# Patient Record
Sex: FEMALE | Race: WHITE | NOT HISPANIC OR LATINO | Employment: OTHER | URBAN - METROPOLITAN AREA
[De-identification: names, ages, dates, MRNs, and addresses within clinical notes are randomized per-mention and may not be internally consistent; named-entity substitution may affect disease eponyms.]

---

## 2019-01-26 ENCOUNTER — HOSPITAL ENCOUNTER (OUTPATIENT)
Facility: CLINIC | Age: 79
Setting detail: OBSERVATION
Discharge: HOME | End: 2019-01-27
Attending: EMERGENCY MEDICINE | Admitting: INTERNAL MEDICINE
Payer: MEDICARE

## 2019-01-26 ENCOUNTER — APPOINTMENT (OUTPATIENT)
Dept: GENERAL RADIOLOGY | Facility: CLINIC | Age: 79
End: 2019-01-26
Payer: MEDICARE

## 2019-01-26 DIAGNOSIS — J44.1 COPD EXACERBATION (H): ICD-10-CM

## 2019-01-26 DIAGNOSIS — J40 BRONCHITIS: ICD-10-CM

## 2019-01-26 LAB
ANION GAP SERPL CALCULATED.3IONS-SCNC: 8 MMOL/L (ref 3–14)
BASOPHILS # BLD AUTO: 0 10E9/L (ref 0–0.2)
BASOPHILS NFR BLD AUTO: 0.2 %
BUN SERPL-MCNC: 15 MG/DL (ref 7–30)
CALCIUM SERPL-MCNC: 9.1 MG/DL (ref 8.5–10.1)
CHLORIDE SERPL-SCNC: 101 MMOL/L (ref 94–109)
CO2 SERPL-SCNC: 29 MMOL/L (ref 20–32)
CREAT SERPL-MCNC: 0.84 MG/DL (ref 0.52–1.04)
DIFFERENTIAL METHOD BLD: NORMAL
EOSINOPHIL # BLD AUTO: 0 10E9/L (ref 0–0.7)
EOSINOPHIL NFR BLD AUTO: 0.6 %
ERYTHROCYTE [DISTWIDTH] IN BLOOD BY AUTOMATED COUNT: 14.1 % (ref 10–15)
FLUAV+FLUBV AG SPEC QL: NEGATIVE
FLUAV+FLUBV AG SPEC QL: NEGATIVE
GFR SERPL CREATININE-BSD FRML MDRD: 67 ML/MIN/{1.73_M2}
GLUCOSE SERPL-MCNC: 108 MG/DL (ref 70–99)
HCT VFR BLD AUTO: 40.8 % (ref 35–47)
HGB BLD-MCNC: 13.7 G/DL (ref 11.7–15.7)
IMM GRANULOCYTES # BLD: 0 10E9/L (ref 0–0.4)
IMM GRANULOCYTES NFR BLD: 0.4 %
INTERPRETATION ECG - MUSE: NORMAL
LYMPHOCYTES # BLD AUTO: 0.9 10E9/L (ref 0.8–5.3)
LYMPHOCYTES NFR BLD AUTO: 15.9 %
MCH RBC QN AUTO: 30 PG (ref 26.5–33)
MCHC RBC AUTO-ENTMCNC: 33.6 G/DL (ref 31.5–36.5)
MCV RBC AUTO: 89 FL (ref 78–100)
MONOCYTES # BLD AUTO: 0.5 10E9/L (ref 0–1.3)
MONOCYTES NFR BLD AUTO: 8.4 %
NEUTROPHILS # BLD AUTO: 4 10E9/L (ref 1.6–8.3)
NEUTROPHILS NFR BLD AUTO: 74.5 %
NRBC # BLD AUTO: 0 10*3/UL
NRBC BLD AUTO-RTO: 0 /100
PLATELET # BLD AUTO: 214 10E9/L (ref 150–450)
POTASSIUM SERPL-SCNC: 3.5 MMOL/L (ref 3.4–5.3)
RBC # BLD AUTO: 4.57 10E12/L (ref 3.8–5.2)
SODIUM SERPL-SCNC: 138 MMOL/L (ref 133–144)
SPECIMEN SOURCE: NORMAL
WBC # BLD AUTO: 5.3 10E9/L (ref 4–11)

## 2019-01-26 PROCEDURE — 99207 ZZC CDG-HISTORY COMP: MEETS EXP. PROB FOCUSED- DOWN CODED LACK OF PFSH: CPT | Performed by: INTERNAL MEDICINE

## 2019-01-26 PROCEDURE — 94640 AIRWAY INHALATION TREATMENT: CPT

## 2019-01-26 PROCEDURE — 99285 EMERGENCY DEPT VISIT HI MDM: CPT | Mod: 25

## 2019-01-26 PROCEDURE — 25000125 ZZHC RX 250: Performed by: EMERGENCY MEDICINE

## 2019-01-26 PROCEDURE — 71046 X-RAY EXAM CHEST 2 VIEWS: CPT

## 2019-01-26 PROCEDURE — 93005 ELECTROCARDIOGRAM TRACING: CPT

## 2019-01-26 PROCEDURE — 99207 ZZC MOONLIGHTING INDICATOR: CPT | Performed by: INTERNAL MEDICINE

## 2019-01-26 PROCEDURE — 40000275 ZZH STATISTIC RCP TIME EA 10 MIN: Mod: 76

## 2019-01-26 PROCEDURE — 25000132 ZZH RX MED GY IP 250 OP 250 PS 637: Mod: GY | Performed by: PHYSICIAN ASSISTANT

## 2019-01-26 PROCEDURE — 80048 BASIC METABOLIC PNL TOTAL CA: CPT | Performed by: EMERGENCY MEDICINE

## 2019-01-26 PROCEDURE — A9270 NON-COVERED ITEM OR SERVICE: HCPCS | Mod: GY | Performed by: PHYSICIAN ASSISTANT

## 2019-01-26 PROCEDURE — 87804 INFLUENZA ASSAY W/OPTIC: CPT | Performed by: EMERGENCY MEDICINE

## 2019-01-26 PROCEDURE — G0378 HOSPITAL OBSERVATION PER HR: HCPCS

## 2019-01-26 PROCEDURE — 85025 COMPLETE CBC W/AUTO DIFF WBC: CPT | Performed by: EMERGENCY MEDICINE

## 2019-01-26 PROCEDURE — 99218 ZZC INITIAL OBSERVATION CARE,LEVL I: CPT | Performed by: INTERNAL MEDICINE

## 2019-01-26 PROCEDURE — 94640 AIRWAY INHALATION TREATMENT: CPT | Mod: 76

## 2019-01-26 PROCEDURE — 25000125 ZZHC RX 250: Performed by: INTERNAL MEDICINE

## 2019-01-26 RX ORDER — PREDNISONE 20 MG/1
40 TABLET ORAL DAILY
Status: DISCONTINUED | OUTPATIENT
Start: 2019-01-27 | End: 2019-01-27 | Stop reason: HOSPADM

## 2019-01-26 RX ORDER — PREDNISONE 20 MG/1
40 TABLET ORAL ONCE
Status: COMPLETED | OUTPATIENT
Start: 2019-01-26 | End: 2019-01-26

## 2019-01-26 RX ORDER — TRIAMTERENE AND HYDROCHLOROTHIAZIDE 37.5; 25 MG/1; MG/1
1 CAPSULE ORAL DAILY
Status: DISCONTINUED | OUTPATIENT
Start: 2019-01-26 | End: 2019-01-26

## 2019-01-26 RX ORDER — LEVOFLOXACIN 500 MG/1
500 TABLET, FILM COATED ORAL DAILY
Status: ON HOLD | COMMUNITY
End: 2019-01-27

## 2019-01-26 RX ORDER — ALBUTEROL SULFATE 0.83 MG/ML
2.5 SOLUTION RESPIRATORY (INHALATION) ONCE
Status: COMPLETED | OUTPATIENT
Start: 2019-01-26 | End: 2019-01-26

## 2019-01-26 RX ORDER — CODEINE PHOSPHATE AND GUAIFENESIN 10; 100 MG/5ML; MG/5ML
5 SOLUTION ORAL EVERY 4 HOURS PRN
Status: DISCONTINUED | OUTPATIENT
Start: 2019-01-26 | End: 2019-01-27 | Stop reason: HOSPADM

## 2019-01-26 RX ORDER — LEVOFLOXACIN 500 MG/1
500 TABLET, FILM COATED ORAL DAILY
Status: DISCONTINUED | OUTPATIENT
Start: 2019-01-26 | End: 2019-01-27 | Stop reason: HOSPADM

## 2019-01-26 RX ORDER — IPRATROPIUM BROMIDE AND ALBUTEROL SULFATE 2.5; .5 MG/3ML; MG/3ML
1 SOLUTION RESPIRATORY (INHALATION) DAILY
Status: DISCONTINUED | OUTPATIENT
Start: 2019-01-26 | End: 2019-01-27 | Stop reason: HOSPADM

## 2019-01-26 RX ORDER — METHYLPREDNISOLONE SODIUM SUCCINATE 125 MG/2ML
125 INJECTION, POWDER, LYOPHILIZED, FOR SOLUTION INTRAMUSCULAR; INTRAVENOUS ONCE
Status: DISCONTINUED | OUTPATIENT
Start: 2019-01-26 | End: 2019-01-26

## 2019-01-26 RX ORDER — ACETAMINOPHEN 650 MG/1
650 SUPPOSITORY RECTAL EVERY 4 HOURS PRN
Status: DISCONTINUED | OUTPATIENT
Start: 2019-01-26 | End: 2019-01-27 | Stop reason: HOSPADM

## 2019-01-26 RX ORDER — ALBUTEROL SULFATE 0.83 MG/ML
3 SOLUTION RESPIRATORY (INHALATION)
Status: DISCONTINUED | OUTPATIENT
Start: 2019-01-26 | End: 2019-01-27 | Stop reason: HOSPADM

## 2019-01-26 RX ORDER — TRIAMTERENE/HYDROCHLOROTHIAZID 37.5-25 MG
1 TABLET ORAL DAILY
Status: DISCONTINUED | OUTPATIENT
Start: 2019-01-26 | End: 2019-01-27 | Stop reason: HOSPADM

## 2019-01-26 RX ORDER — ACETAMINOPHEN 325 MG/1
650 TABLET ORAL EVERY 4 HOURS PRN
Status: DISCONTINUED | OUTPATIENT
Start: 2019-01-26 | End: 2019-01-27 | Stop reason: HOSPADM

## 2019-01-26 RX ORDER — LIDOCAINE 40 MG/G
CREAM TOPICAL
Status: DISCONTINUED | OUTPATIENT
Start: 2019-01-26 | End: 2019-01-27 | Stop reason: HOSPADM

## 2019-01-26 RX ORDER — IPRATROPIUM BROMIDE AND ALBUTEROL SULFATE 2.5; .5 MG/3ML; MG/3ML
3 SOLUTION RESPIRATORY (INHALATION)
Status: DISCONTINUED | OUTPATIENT
Start: 2019-01-26 | End: 2019-01-26

## 2019-01-26 RX ORDER — ALBUTEROL SULFATE 90 UG/1
2 AEROSOL, METERED RESPIRATORY (INHALATION) 2 TIMES DAILY
Status: DISCONTINUED | OUTPATIENT
Start: 2019-01-26 | End: 2019-01-26

## 2019-01-26 RX ORDER — TRIAMTERENE AND HYDROCHLOROTHIAZIDE 37.5; 25 MG/1; MG/1
1 CAPSULE ORAL DAILY
COMMUNITY
End: 2021-03-10

## 2019-01-26 RX ADMIN — GUAIFENESIN AND CODEINE PHOSPHATE 5 ML: 100; 10 SOLUTION ORAL at 21:43

## 2019-01-26 RX ADMIN — PREDNISONE 40 MG: 20 TABLET ORAL at 13:20

## 2019-01-26 RX ADMIN — ALBUTEROL SULFATE 2.5 MG: 2.5 SOLUTION RESPIRATORY (INHALATION) at 10:50

## 2019-01-26 RX ADMIN — IPRATROPIUM BROMIDE AND ALBUTEROL SULFATE 3 ML: .5; 2.5 SOLUTION RESPIRATORY (INHALATION) at 20:08

## 2019-01-26 ASSESSMENT — ENCOUNTER SYMPTOMS
FEVER: 1
CONSTIPATION: 0
SHORTNESS OF BREATH: 1
DIARRHEA: 0
COUGH: 1

## 2019-01-26 ASSESSMENT — MIFFLIN-ST. JEOR: SCORE: 1363.58

## 2019-01-26 NOTE — H&P
[facility]    History and Physical  Hospitalist       Date of Admission:  1/26/2019    Assessment & Plan   Nena Sanders is a 78 year old female with a history of COPD and chronic bronchiolitis who presents with shortness of breath. In the ED patient's blood pressure was normal.  Rest of vitals are normal.  Was afebrile.  EKG with a heart rate of 98.  Chest x-ray unremarkable.  Labs are unremarkable with a white count of 5.3.  Influenza was negative.  She was given albuterol nebulizer.  She was also given 40 mg of prednisone.  Patient was admitted for a COPD exacerbation.    Problem 1: COPD exacerbation  -Patient admitted for COPD exacerbation.  Will give 125 mcg of IV methylprednisone as a one-time dose on the floor.  Will start prednisone 40 mg daily tomorrow.  Will likely need short course of 5-7 days per steroids.  -Respiratory care consult.  Patient has gotten albuterol nebulizers in the ED.  We will continue for now.  currently on 3 liters of oxygen by nasal cannula and saturating well in the upper 90s-100% range  -We will also continue the patient's likely will need Levaquin.  Course of 5-7 days on discharge.  -Sees pulmonary as an outpatient.  No need for pulmonary consult at this time.    Problem 2: Obstructive sleep apnea.    -The patient does not use CPAP.  She is on 4 liters of oxygen throughout the night while sleeping.    Problem 3:  Left hip osteoarthritis  - stable    # Pain Assessment:  Current Pain Score 1/26/2019   Patient currently in pain? denies   Pain score (0-10) 0   Pain location -   Pain descriptors -   Nena s pain level was assessed and she currently denies pain.      DVT Prophylaxis: Low Risk/Ambulatory with no VTE prophylaxis indicated  Code Status: Full Code    Disposition: Expected discharge in 1 days once breathing improves.    Chi Gallego MD    Primary Care Physician   Damon Leon    Chief Complaint   COPD exaccerbation    History is obtained from the  patient    History of Present Illness   Nena Sanders is a 78 year old female with a history of COPD and chronic bronchiolitis who presents with shortness of breath. The patient has had a productive cough since yesterday in the setting of a possible fever subjectively, but presents to the ED with worsening shortness of breath over the last day. There are no exacerbators of alleviators of her shortness of breath. She otherwise denies blood in her sputum, chest pain, abnormal bowel movements, or pedal edema. She has COPD, but has never been in the hospital for exacerbations. She is prescribed Levaquin by her pulmonologist for preventative care when she develops these types of productive coughs and has taken two doses of Levaquin in the last two days. She uses both inhalers and nebulizers and nebulizes in the mornings. She oxygenates in the low 90s at baseline. Her flu shot is up-to-date.     In the ED patient's blood pressure was normal.  Rest of vitals are normal.  Was afebrile.  EKG with a heart rate of 98.  Chest x-ray unremarkable.  Labs are unremarkable with a white count of 5.3.  Influenza was negative.  She was given albuterol nebulizer.  She was also given 40 mg of prednisone.  Patient was admitted for a COPD exacerbation.    Past Medical History    I have reviewed this patient's medical history and updated it with pertinent information if needed.   Past Medical History:   Diagnosis Date     Arthritis     hip     Chronic obstructive lung disease (H)      Chronic rhinitis      COPD (chronic obstructive pulmonary disease) (H)      Macular degeneration     wet     Nocturnal hypoxia      Osteopenia      Oxygen dependent     4L/nc at night and as needed during the day     Pulmonary nodules      Radius fracture      Sleep apnea     does not use cpap       Past Surgical History   I have reviewed this patient's surgical history and updated it with pertinent information if needed.  Past Surgical History:    Procedure Laterality Date     ARTHROPLASTY HIP ANTERIOR Right 12/16/2015    Procedure: ARTHROPLASTY HIP ANTERIOR;  Surgeon: Alf Rivera MD;  Location: [facility] OR     ARTHROPLASTY HIP ANTERIOR Left 1/26/2016    Procedure: ARTHROPLASTY HIP ANTERIOR;  Surgeon: Alf Rivera MD;  Location: [facility] OR     COLONOSCOPY       EYE SURGERY      bilat cataracts     Pr punc/aspir breast cyst benign       TONSILLECTOMY      AGE 11       Prior to Admission Medications   Prior to Admission Medications   Prescriptions Last Dose Informant Patient Reported? Taking?   Ipratropium-Albuterol (COMBIVENT RESPIMAT)  MCG/ACT inhaler 1/25/2019 at [facility] Yes Yes   Sig: Inhale 1 puff into the lungs daily   albuterol (PROAIR HFA, PROVENTIL HFA, VENTOLIN HFA) 108 (90 BASE) MCG/ACT inhaler  at PRN Self Yes Yes   Sig: Inhale 2 puffs into the lungs 2 times daily   fluticasone-salmeterol (ADVAIR) 250-50 MCG/DOSE diskus inhaler 1/25/2019 at [facility] Yes Yes   Sig: Inhale 2 puffs into the lungs every 12 hours   guaiFENesin-codeine (ROBITUSSIN AC) 100-10 MG/5ML SOLN  at PRN Self Yes Yes   Sig: Take 1 tsp. by mouth daily as needed    ipratropium - albuterol 0.5 mg/2.5 mg/3 mL (DUONEB) 0.5-2.5 (3) MG/3ML nebulization 1/25/2019 at am Self Yes Yes   Sig: Take 1 vial by nebulization daily    levofloxacin (LEVAQUIN) 500 MG tablet 1/26/2019 at 2nd dose this am Pharmacy Yes Yes   Sig: Take 500 mg by mouth daily For 7 days   triamterene-HCTZ (DYAZIDE) 37.5-25 MG capsule 1/25/2019 at Unknown time Pharmacy Yes Yes   Sig: Take 1 capsule by mouth daily      Facility-Administered Medications: None     Allergies   No Known Allergies    Social History   I have reviewed this patient's social history and updated it with pertinent information if needed. Nena Sanders  reports that  has never smoked. She does not have any smokeless tobacco history on file. She reports that she drinks alcohol. She reports that she does not use drugs.    Family History    I have reviewed this patient's family history and updated it with pertinent information if needed.   No family history on file.    Review of Systems   The 10 point Review of Systems is negative other than noted in the HPI or here.     Physical Exam   Temp: 97.6  F (36.4  C) Temp src: Temporal BP: 141/86 Pulse: 102 Heart Rate: 104 Resp: 20 SpO2: 97 % O2 Device: Nasal cannula Oxygen Delivery: 3 LPM  Vital Signs with Ranges  Temp:  [97.6  F (36.4  C)] 97.6  F (36.4  C)  Pulse:  [] 102  Heart Rate:  [104] 104  Resp:  [20-24] 20  BP: (141-153)/(72-97) 141/86  SpO2:  [93 %-98 %] 97 %  190 lbs 0 oz    GEN: NAD, pleasant  HEENT: no icterus  CV: RRR, normal s1/s2, no murmurs/rubs/s3/s4, no heave. JVP normal.  CHEST: CTAB  ABD: soft, NT/ND, NABS  : no flank/suprapubic tenderness  NEURO: AA&Ox3, fluent/appropriate, motor grossly nonfocal  PSYCH: cooperative, affect appropriate    Data   Data reviewed today:  I personally reviewed no images or EKG's today.  Recent Labs   Lab 01/26/19  1045   WBC 5.3   HGB 13.7   MCV 89         POTASSIUM 3.5   CHLORIDE 101   CO2 29   BUN 15   CR 0.84   ANIONGAP 8   TANISHA 9.1   *       Imaging:  Recent Results (from the past 24 hour(s))   XR Chest 2 Views    Narrative    CHEST TWO VIEWS   1/26/2019 11:11 AM    HISTORY:  Productive cough and dyspnea.    COMPARISON:  None.    FINDINGS:  The heart size is normal. No mediastinal pathology is seen.  The lungs are clear. The pulmonary vasculature is normal. No  pneumothorax or pleural effusion is seen.      Impression    IMPRESSION:  Unremarkable chest.    DAVID PLASENCIA MD

## 2019-01-26 NOTE — ED PROVIDER NOTES
History     Chief Complaint:  Shortness of breath     HPI   Nena Sanders is a 78 year old female with a history of COPD and chronic bronchiolitis who presents with shortness of breath. The patient has had a productive cough since yesterday in the setting of a possible fever subjectively, but presents to the ED with worsening shortness of breath over the last day. There are no exacerbators of alleviators of her shortness of breath. She otherwise denies blood in her sputum, chest pain, abnormal bowel movements, or pedal edema. She has COPD, but has never been in the hospital for exacerbations. She is prescribed Levaquin by her pulmonologist for preventative care when she develops these types of productive coughs and has taken two doses of Levaquin in the last two days. She uses both inhalers and nebulizers and nebulizes in the mornings. She oxygenates in the low 90s at baseline. Her flu shot is up-to-date.     PE/DVT RISK FACTORS:  Sex:    Female  Hormones:   No  Tobacco:   No  Cancer:   No  Travel:   No  Surgery:   No  Other immobilization: No  Personal history:  No  Family history:  No     Allergies:  No known drug allergies     Medications:    Compavent  Advair  Ferrous sulfate   Albuterol inhaler  Robitussin  Duoneb  Roxicodone     Past Medical History:    Chronic ronchiolitis   Arthritis  COPD  Chronic rhinitis  Macular degeneration  Nocturnal hypoxia  CHAN  Osteopenia  Pulmonary nodules    Past Surgical History:    Hip arthroplasty bilateral  Colonoscopy  Bilateral cataracts  Pr punc/aspir breast cyst benign  Tonsillectomy    Family History:    History reviewed. No pertinent family history.      Social History:  Smoking Status: Never Smoker  Alcohol Use: Yes  Patient presents with daughter.   Marital Status:  Single      Review of Systems   Constitutional: Positive for fever (subjective).   Respiratory: Positive for cough and shortness of breath.    Cardiovascular: Negative for chest pain and leg  "swelling.   Gastrointestinal: Negative for constipation and diarrhea.   All other systems reviewed and are negative.    Physical Exam     Patient Vitals for the past 24 hrs:   BP Temp Temp src Pulse Heart Rate Resp SpO2 Height Weight   01/26/19 1518 -- -- -- -- -- -- 94 % -- --   01/26/19 1513 138/77 99  F (37.2  C) Oral -- 91 20 96 % -- --   01/26/19 1245 141/86 -- -- 102 -- 20 97 % -- --   01/26/19 1230 153/77 -- -- 103 -- 20 98 % -- --   01/26/19 1215 (!) 148/97 -- -- 91 -- 20 98 % -- --   01/26/19 1100 -- -- -- -- -- -- 96 % -- --   01/26/19 1037 -- -- -- -- -- -- 93 % -- --   01/26/19 1024 152/72 97.6  F (36.4  C) Temporal -- 104 24 93 % 1.676 m (5' 6\") 86.2 kg (190 lb)      Physical Exam   SKIN:  Warm, dry.  HEMATOLOGIC/IMMUNOLOGIC/LYMPHATIC:  No pallor.  No peripheral edema.  HENT:  No JVD.  EYES:  Conjunctivae normal.  CARDIOVASCULAR:  Regular rate and rhythm, no murmur.  No rub.  RESPIRATORY:  No respiratory distress, breath sounds equal and normal.  GASTROINTESTINAL:  Soft, nontender abdomen.  No mass or distension.  MUSCULOSKELETAL:  Normal body habitus.  NEUROLOGIC:  Alert, conversant.  PSYCHIATRIC:  Normal mood.    Emergency Department Course     ECG (11:18:21):  Rate 98 bpm. CA interval 134. QRS duration 92. QT/QTc 372/474. P-R-T axes 83 79 75. Normal sinus rhythm. Right atrial enlargement. Nonspecific ST abnormality. Abnormal ECG. Interpreted at 1151 by Neal Pope MD.     Imaging:  Radiographic findings were communicated with the patient who voiced understanding of the findings.    X-ray Chest, 2 views:  Unremarkable chest.  Result per radiology.     Laboratory:  CBC: WNL (WBC 5.3, HGB 13.7, )  BMP: Glucose 108 (H), o/w WNL (Creatinine 0.84)     Influenza A/B Antigen: Negative    Interventions:  1050 - Albuterol 2.5 mg neb  1320 - Prednisone 40 mg PO     Emergency Department Course:  Past medical records, nursing notes, and vitals reviewed.  1035: I performed an exam of the patient and " "obtained history, as documented above.    IV inserted and blood drawn. This was sent to laboratory for testing, findings above. The patient was placed on cardiac monitoring. The patient was sent for a chest x-ray while in the emergency department, findings above.     1148: I rechecked the patient. Explained findings to patient.    1232: The patient became very short of breath after a \"road test.\"     1233: Findings and plan explained to the Patient who consents to admission.     1300: Discussed the patient with Dr. Gallego, who will admit the patient to a hospital bed for further monitoring, evaluation, and treatment.      Impression & Plan      Medical Decision Making:  Nena Sanders is a 78 year old female who presents with what seems to be exacerbated COPD. She lacks risk factors for pulmonary embolism. Feels like a COPD exacerbation to her.  Evaluation reassuring. She ambulated on supplemental oxygen and was very short of breath in doing so.  She'll be admitted for further evaluation and treatment under the care of Dr. Gallego.    Diagnosis:    ICD-10-CM    1. COPD exacerbation (H) J44.1    2. Bronchitis J40      Disposition:  Admitted to the hospital.    Timbo Paez  1/26/2019    EMERGENCY DEPARTMENT    Scribe Disclosure:  Timbo REID Chi, am serving as a scribe at 10:35 AM on 1/26/2019 to document services personally performed by Neal Pope MD based on my observations and the provider's statements to me.        Neal Pope MD  01/27/19 1036    "

## 2019-01-26 NOTE — PROGRESS NOTES
"[facility]  ED Nurse Handoff Report    ED Chief complaint: Shortness of Breath (hx of copd. prod cough. on levaquin)      ED Diagnosis:   Final diagnoses:   COPD exacerbation (H)   Bronchitis       Code Status: Full Code    Allergies: No Known Allergies    Activity level - Baseline/Home:  Independent    Activity Level - Current:   Stand with Assist     Needed?: No    Isolation: No  Infection: Not Applicable  Bariatric?: No    Vital Signs:   Vitals:    01/26/19 1037 01/26/19 1100 01/26/19 1215 01/26/19 1230   BP:   (!) 148/97 153/77   Pulse:   91 103   Resp:   20 20   Temp:       TempSrc:       SpO2: 93% 96% 98% 98%   Weight:       Height:           Cardiac Rhythm: ,        Pain level: 0-10 Pain Scale: 0    Is this patient confused?: No   Does this patient have a guardian?  No         If yes, is there guardianship documents in the Epic \"Code/ACP\" activity?  N/A         Guardian Notified?  N/A  [place] - Suicide Severity Rating Scale Completed?  Yes  If yes, what color did the patient score?  White    Patient Report: Initial Complaint: SOB  Focused Assessment:   Tests Performed: see labs  Abnormal Results: see results  Treatments provided: nebs    Family Comments: daughter at bedside  OBS brochure/video discussed/provided to patient/family: Yes              Name of person given brochure if not patient:               Relationship to patient:     ED Medications:   Medications   albuterol (PROVENTIL) neb solution 2.5 mg (2.5 mg Nebulization Given 1/26/19 1050)       Drips infusing?:  No    For the majority of the shift this patient was Green.   Interventions performed were see noites    Severe Sepsis OR Septic Shock Diagnosis Present: No    To be done/followed up on inpatient unit:     ED NURSE PHONE NUMBER: [phone]         "

## 2019-01-26 NOTE — PHARMACY-ADMISSION MEDICATION HISTORY
Admission medication history interview status for the 1/26/2019  admission is complete. See EPIC admission navigator for prior to admission medications     Medication history source reliability:Good    Actions taken by pharmacist (provider contacted, etc):Spoke with patient, had her inhalers and triamterene/hctz and levofloxacin prescription bottles.     Additional medication history information not noted on PTA med list:  - Deleted acetaminophen, oxycodone, senna -- patient had those for a hip surgery, no longer taking  - Patient would like to use her own inhalers inpatient if possible -- has both of them with her  - Currently on levofloxacin 500mg once daily x 7 days -- completed 2 days of therapy thus far (last dose this am)     Medication reconciliation/reorder completed by provider prior to medication history? No    Time spent in this activity: 20 min    Prior to Admission medications    Medication Sig Last Dose Taking? Auth Provider   albuterol (PROAIR HFA, PROVENTIL HFA, VENTOLIN HFA) 108 (90 BASE) MCG/ACT inhaler Inhale 2 puffs into the lungs 2 times daily  at PRN Yes Reported, Patient   fluticasone-salmeterol (ADVAIR) 250-50 MCG/DOSE diskus inhaler Inhale 2 puffs into the lungs every 12 hours 1/25/2019 at PM Yes Reported, Patient   guaiFENesin-codeine (ROBITUSSIN AC) 100-10 MG/5ML SOLN Take 1 tsp. by mouth daily as needed   at PRN Yes Reported, Patient   ipratropium - albuterol 0.5 mg/2.5 mg/3 mL (DUONEB) 0.5-2.5 (3) MG/3ML nebulization Take 1 vial by nebulization daily  1/25/2019 at am Yes Reported, Patient   Ipratropium-Albuterol (COMBIVENT RESPIMAT)  MCG/ACT inhaler Inhale 1 puff into the lungs daily 1/25/2019 at PM Yes Unknown, Entered By History   levofloxacin (LEVAQUIN) 500 MG tablet Take 500 mg by mouth daily For 7 days 1/26/2019 at 2nd dose this am Yes Unknown, Entered By History   triamterene-HCTZ (DYAZIDE) 37.5-25 MG capsule Take 1 capsule by mouth daily 1/25/2019 at Unknown time Yes  Unknown, Entered By History       Shanique Thompson, PharmD IV Student

## 2019-01-26 NOTE — ED NOTES
"Patient was ambulated here in the ED. O2 saturations remained between 94%-98% and HR remained between 100-110 bpm. However, the patient states that she feels markedly short of breath and \"exhausted.\"  "

## 2019-01-27 VITALS
DIASTOLIC BLOOD PRESSURE: 66 MMHG | TEMPERATURE: 97.9 F | BODY MASS INDEX: 30.53 KG/M2 | HEART RATE: 102 BPM | SYSTOLIC BLOOD PRESSURE: 133 MMHG | WEIGHT: 190 LBS | OXYGEN SATURATION: 92 % | HEIGHT: 66 IN | RESPIRATION RATE: 16 BRPM

## 2019-01-27 PROCEDURE — A9270 NON-COVERED ITEM OR SERVICE: HCPCS | Mod: GY | Performed by: PHYSICIAN ASSISTANT

## 2019-01-27 PROCEDURE — 40000275 ZZH STATISTIC RCP TIME EA 10 MIN

## 2019-01-27 PROCEDURE — G0378 HOSPITAL OBSERVATION PER HR: HCPCS

## 2019-01-27 PROCEDURE — 94640 AIRWAY INHALATION TREATMENT: CPT

## 2019-01-27 PROCEDURE — 25000132 ZZH RX MED GY IP 250 OP 250 PS 637: Mod: GY | Performed by: PHYSICIAN ASSISTANT

## 2019-01-27 PROCEDURE — 25000132 ZZH RX MED GY IP 250 OP 250 PS 637: Mod: GY | Performed by: INTERNAL MEDICINE

## 2019-01-27 PROCEDURE — 25000125 ZZHC RX 250: Performed by: INTERNAL MEDICINE

## 2019-01-27 RX ORDER — IPRATROPIUM BROMIDE AND ALBUTEROL SULFATE 2.5; .5 MG/3ML; MG/3ML
1 SOLUTION RESPIRATORY (INHALATION) DAILY
Qty: 90 ML | Refills: 0 | Status: ON HOLD
Start: 2019-01-27 | End: 2019-12-18

## 2019-01-27 RX ORDER — PREDNISONE 10 MG/1
TABLET ORAL
Qty: 28 TABLET | Refills: 0 | Status: ON HOLD | OUTPATIENT
Start: 2019-01-27 | End: 2019-12-18

## 2019-01-27 RX ORDER — ALBUTEROL SULFATE 90 UG/1
2 AEROSOL, METERED RESPIRATORY (INHALATION) EVERY 4 HOURS PRN
Status: ON HOLD
Start: 2019-01-27 | End: 2019-12-18

## 2019-01-27 RX ORDER — LEVOFLOXACIN 500 MG/1
500 TABLET, FILM COATED ORAL DAILY
Qty: 5 TABLET | Refills: 0
Start: 2019-01-27 | End: 2019-02-01

## 2019-01-27 RX ADMIN — TRIAMTERENE AND HYDROCHLOROTHIAZIDE 1 TABLET: 37.5; 25 TABLET ORAL at 08:43

## 2019-01-27 RX ADMIN — LEVOFLOXACIN 500 MG: 500 TABLET, FILM COATED ORAL at 08:46

## 2019-01-27 RX ADMIN — IPRATROPIUM BROMIDE AND ALBUTEROL SULFATE 3 ML: .5; 2.5 SOLUTION RESPIRATORY (INHALATION) at 08:11

## 2019-01-27 RX ADMIN — GUAIFENESIN AND CODEINE PHOSPHATE 5 ML: 100; 10 SOLUTION ORAL at 10:06

## 2019-01-27 RX ADMIN — GUAIFENESIN AND CODEINE PHOSPHATE 5 ML: 100; 10 SOLUTION ORAL at 02:23

## 2019-01-27 RX ADMIN — PREDNISONE 40 MG: 20 TABLET ORAL at 08:43

## 2019-01-27 NOTE — PLAN OF CARE
Patient desats to 85% on RA with activity - returns to low 90%'s on RA - Dr. Aguilar aware, up independently, voids independently in bathroom, denies pain, SL discontinued, discharge instructions reviewed with patient, and patient discharged home.

## 2019-01-27 NOTE — DISCHARGE SUMMARY
[facility]  Discharge Summary        Nena Sanders MRN# [id number]   YOB: 1940 Age: 78 year old     Date of Admission:  1/26/2019  Date of Discharge:  1/27/2019  Admitting Physician:  Chi Gallego MD  Discharge Physician: Erica Aguilar MD  Discharging Service: Hospitalist     Primary Provider: Damon Leon  Primary Care Physician Phone Number: [phone]         Discharge Diagnoses/Problem Oriented Hospital Course (Providers):    Nena Sanders was admitted on 1/26/2019 by Chi Gallego MD and I would refer you to their history and physical.  The following problems were addressed during her hospitalization:  78 year old female with a history of COPD and chronic bronchiolitis who presents with shortness of breath. In the ED patient's blood pressure was normal.  Rest of vitals are normal.  Was afebrile.  EKG with a heart rate of 98.  Chest x-ray unremarkable.  Labs are unremarkable with a white count of 5.3.  Influenza was negative.  She was given albuterol nebulizer.  She was also given 40 mg of prednisone.  Patient was admitted for a COPD exacerbation.     COPD exacerbation:  -Patient admitted for COPD exacerbation.    -she got 125 mcg of IV methylprednisone as a one-time dose on the floor.    she was  Started on po prednisone 40 mg   -dischaged on tapering dose  -patient improved   -she used nebulizer also  -she  will also continue  Levaquin.    Course of 5-7 days on discharge.  -She started that herself as  Has back up script by her pulmonary  -Sees pulmonary as an outpatient.    -she was stable at the time of discharge.     Obstructive sleep apnea.    -The patient does not use CPAP.   - She is on 4 liters of oxygen throughout the night while sleeping.  -per patient she uses oxygen during day time also if needed     Left hip osteoarthritis:  - stable      DVT Prophylaxis:   Low Risk/Ambulatory with no VTE prophylaxis indicated     Disposition:   Discharged to  home  Patient wanted to be discharged as was feeling lot better.      Code Status:      Full Code        Brief Hospital Stay Summary Sent Home With Patient in AVS:        Reason for your hospital stay      You were admitted due to COPD exacerbation.  Responded to IV steroid and oral steroid and antibiotics  Your symptoms improved significantly after staying for one day  We are discharging you today on your request as you are feeling lot better   on oral tapering prednisone and levaquin                 Important Results:              Pending Results:        Unresulted Labs Ordered in the Past 30 Days of this Admission     No orders found for last 61 day(s).            Discharge Instructions and Follow-Up:      Follow-up Appointments     Follow-up and recommended labs and tests       Follow up with primary care provider, Damon Leon, within 7 days for   hospital follow- up.    Follow up with pulmonary in 3-5 days               Discharge Disposition:      Discharged to home        Discharge Medications:        Discharge Medication List as of 1/27/2019 11:33 AM      START taking these medications    Details   predniSONE (DELTASONE) 10 MG tablet 4 tabs daily for 2 days, then 3 tabs daily for 3 days, then 2 tabs daily for 3 days, then 1 tab daily for 3 then one half tablet for 3 days, then stop., Disp-28 tablet, R-0, Local Print         CONTINUE these medications which have CHANGED    Details   albuterol (PROAIR HFA/PROVENTIL HFA/VENTOLIN HFA) 108 (90 Base) MCG/ACT inhaler Inhale 2 puffs into the lungs every 4 hours as needed, No Print Out      fluticasone-salmeterol (ADVAIR) 250-50 MCG/DOSE inhaler Inhale 1 puff into the lungs every 12 hours, No Print Out      ipratropium - albuterol 0.5 mg/2.5 mg/3 mL (DUONEB) 0.5-2.5 (3) MG/3ML neb solution Take 1 vial (3 mLs) by nebulization daily, Disp-90 mL, R-0, No Print Out      levofloxacin (LEVAQUIN) 500 MG tablet Take 1 tablet (500 mg) by mouth daily for 5 days For 7 days,  "Disp-5 tablet, R-0, No Print Out         CONTINUE these medications which have NOT CHANGED    Details   guaiFENesin-codeine (ROBITUSSIN AC) 100-10 MG/5ML SOLN Take 1 tsp. by mouth daily as needed , Historical      triamterene-HCTZ (DYAZIDE) 37.5-25 MG capsule Take 1 capsule by mouth daily, Historical         STOP taking these medications       Ipratropium-Albuterol (COMBIVENT RESPIMAT)  MCG/ACT inhaler Comments:   Reason for Stopping:                 Allergies:       No Known Allergies        Consultations This Hospital Stay:      No consultations were requested during this admission        Condition and Physical on Discharge:      Discharge condition: Stable   Vitals: Heart Rate: 61, Blood pressure 133/66, pulse 102, temperature 97.9  F (36.6  C), temperature source Oral, resp. rate 16, height 1.676 m (5' 6\"), weight 86.2 kg (190 lb), SpO2 92 %.     Constitutional: Alert awake oriented     Lungs: Fair air entry on both sides of lungs   Occasional wheez   Cardiovascular: S1 and S2 no S3      Abdomen: Abdomen soft no guarding ,no rigidity, bowel sounds are positive     Skin:    Other:          Discharge Time:      Greater than 30 minutes.        Image Results From This Hospital Stay (For Non-EPIC Providers):        Results for orders placed or performed during the hospital encounter of 01/26/19   XR Chest 2 Views    Narrative    CHEST TWO VIEWS   1/26/2019 11:11 AM    HISTORY:  Productive cough and dyspnea.    COMPARISON:  None.    FINDINGS:  The heart size is normal. No mediastinal pathology is seen.  The lungs are clear. The pulmonary vasculature is normal. No  pneumothorax or pleural effusion is seen.      Impression    IMPRESSION:  Unremarkable chest.    DAVID PLASENCIA MD           Most Recent Lab Results In EPIC (For Non-EPIC Providers):    Most Recent 3 CBC's:  Recent Labs   Lab Test 01/26/19  1045 01/28/16  0629 01/27/16  0642 01/26/16  1715  12/16/15  1754   WBC 5.3  --   --   --   --   --    HGB " 13.7 7.9* 9.0*  --    < >  --    MCV 89  --   --   --   --   --      --   --  212  --  227    < > = values in this interval not displayed.      Most Recent 3 BMP's:  Recent Labs   Lab Test 01/26/19  1045 01/27/16  0642 01/26/16  1715 12/17/15  0619 12/16/15  1754     --   --   --   --    POTASSIUM 3.5  --   --   --   --    CHLORIDE 101  --   --   --   --    CO2 29  --   --   --   --    BUN 15  --   --   --   --    CR 0.84  --  0.70  --  0.64   ANIONGAP 8  --   --   --   --    TANISHA 9.1  --   --   --   --    * 88  --  129*  --      Most Recent 3 Troponin's:No lab results found.    Invalid input(s): TROP, TROPONINIES  Most Recent 3 INR's:No lab results found.  Most Recent 2 LFT's:No lab results found.  Most Recent Cholesterol Panel:No lab results found.  Most Recent 6 Bacteria Isolates From Any Culture (See EPIC Reports for Culture Details):No lab results found.  Most Recent TSH, T4 and HgbA1c: No lab results found.

## 2019-01-27 NOTE — PROGRESS NOTES
- Improvement of Peak Flow to greater than 70% sustained off nebulizer for 4 hours. - MET  - Dyspnea improved and oxygen saturations greater than 88% on room air or prior home oxygen levels. - MET   - Vitals signs normal or at patient baseline. - MET   - Safe disposition plan has been identified. - MET  - Nurse to notify provider when observation goals have been met and patient is ready for discharge. - MET

## 2019-01-27 NOTE — PLAN OF CARE
A&Ox4. VSS on 4L O2 via NC overnight at baseline. Up Independently. Productive cough, given Robitussin x1. LS with expiratory wheezes. Denies pain. Possible discharge today. Continue to monitor.

## 2019-01-27 NOTE — PLAN OF CARE
ER admit at 1500.A/o x4 VSS. 3LPM low to mid 90's. 4LPM overnight -baseline.IVSL. Regular diet. Up independently to bathroom. Productive cough, exp wheezes, PRN nebs, pt would like them q4. Robitussin given x1 for cough. Pt does have home meds in bin.Plan pending pt progress.

## 2019-12-17 ENCOUNTER — HOSPITAL ENCOUNTER (INPATIENT)
Facility: CLINIC | Age: 79
LOS: 9 days | Discharge: HOME HEALTH | DRG: 189 | End: 2019-12-27
Attending: EMERGENCY MEDICINE | Admitting: HOSPITALIST
Payer: MEDICARE

## 2019-12-17 ENCOUNTER — APPOINTMENT (OUTPATIENT)
Dept: GENERAL RADIOLOGY | Facility: CLINIC | Age: 79
DRG: 189 | End: 2019-12-17
Attending: EMERGENCY MEDICINE
Payer: MEDICARE

## 2019-12-17 DIAGNOSIS — J96.22 ACUTE AND CHRONIC RESPIRATORY FAILURE WITH HYPERCAPNIA (H): ICD-10-CM

## 2019-12-17 DIAGNOSIS — I21.4 NSTEMI (NON-ST ELEVATED MYOCARDIAL INFARCTION) (H): Primary | ICD-10-CM

## 2019-12-17 DIAGNOSIS — I21.4 NSTEMI (NON-ST ELEVATED MYOCARDIAL INFARCTION) (H): ICD-10-CM

## 2019-12-17 DIAGNOSIS — J44.1 CHRONIC OBSTRUCTIVE PULMONARY DISEASE WITH ACUTE EXACERBATION (H): ICD-10-CM

## 2019-12-17 DIAGNOSIS — J44.1 COPD EXACERBATION (H): ICD-10-CM

## 2019-12-17 LAB
BASOPHILS # BLD AUTO: 0 10E9/L (ref 0–0.2)
BASOPHILS NFR BLD AUTO: 0.1 %
CO2 BLDCOV-SCNC: 32 MMOL/L (ref 21–28)
DIFFERENTIAL METHOD BLD: NORMAL
EOSINOPHIL # BLD AUTO: 0 10E9/L (ref 0–0.7)
EOSINOPHIL NFR BLD AUTO: 0 %
ERYTHROCYTE [DISTWIDTH] IN BLOOD BY AUTOMATED COUNT: 14.9 % (ref 10–15)
HCT VFR BLD AUTO: 41.5 % (ref 35–47)
HGB BLD-MCNC: 13.3 G/DL (ref 11.7–15.7)
IMM GRANULOCYTES # BLD: 0 10E9/L (ref 0–0.4)
IMM GRANULOCYTES NFR BLD: 0.2 %
LACTATE BLD-SCNC: 1.6 MMOL/L (ref 0.7–2.1)
LYMPHOCYTES # BLD AUTO: 1.1 10E9/L (ref 0.8–5.3)
LYMPHOCYTES NFR BLD AUTO: 13.4 %
MCH RBC QN AUTO: 29.2 PG (ref 26.5–33)
MCHC RBC AUTO-ENTMCNC: 32 G/DL (ref 31.5–36.5)
MCV RBC AUTO: 91 FL (ref 78–100)
MONOCYTES # BLD AUTO: 0.9 10E9/L (ref 0–1.3)
MONOCYTES NFR BLD AUTO: 10.1 %
NEUTROPHILS # BLD AUTO: 6.5 10E9/L (ref 1.6–8.3)
NEUTROPHILS NFR BLD AUTO: 76.2 %
NRBC # BLD AUTO: 0 10*3/UL
NRBC BLD AUTO-RTO: 0 /100
PCO2 BLDV: 65 MM HG (ref 40–50)
PH BLDV: 7.3 PH (ref 7.32–7.43)
PLATELET # BLD AUTO: 204 10E9/L (ref 150–450)
PO2 BLDV: 31 MM HG (ref 25–47)
RBC # BLD AUTO: 4.56 10E12/L (ref 3.8–5.2)
SAO2 % BLDV FROM PO2: 50 %
WBC # BLD AUTO: 8.5 10E9/L (ref 4–11)

## 2019-12-17 PROCEDURE — 84145 PROCALCITONIN (PCT): CPT | Performed by: EMERGENCY MEDICINE

## 2019-12-17 PROCEDURE — 82803 BLOOD GASES ANY COMBINATION: CPT

## 2019-12-17 PROCEDURE — 84484 ASSAY OF TROPONIN QUANT: CPT | Performed by: EMERGENCY MEDICINE

## 2019-12-17 PROCEDURE — 25000125 ZZHC RX 250: Performed by: EMERGENCY MEDICINE

## 2019-12-17 PROCEDURE — 83605 ASSAY OF LACTIC ACID: CPT

## 2019-12-17 PROCEDURE — 99285 EMERGENCY DEPT VISIT HI MDM: CPT | Mod: 25

## 2019-12-17 PROCEDURE — 94640 AIRWAY INHALATION TREATMENT: CPT

## 2019-12-17 PROCEDURE — 87040 BLOOD CULTURE FOR BACTERIA: CPT | Performed by: EMERGENCY MEDICINE

## 2019-12-17 PROCEDURE — 80048 BASIC METABOLIC PNL TOTAL CA: CPT | Performed by: EMERGENCY MEDICINE

## 2019-12-17 PROCEDURE — 85025 COMPLETE CBC W/AUTO DIFF WBC: CPT | Performed by: EMERGENCY MEDICINE

## 2019-12-17 PROCEDURE — 25000125 ZZHC RX 250

## 2019-12-17 PROCEDURE — 71045 X-RAY EXAM CHEST 1 VIEW: CPT

## 2019-12-17 RX ORDER — SODIUM CHLORIDE 9 MG/ML
1000 INJECTION, SOLUTION INTRAVENOUS CONTINUOUS
Status: DISCONTINUED | OUTPATIENT
Start: 2019-12-18 | End: 2019-12-20

## 2019-12-17 RX ORDER — ALBUTEROL SULFATE 0.83 MG/ML
2.5 SOLUTION RESPIRATORY (INHALATION)
Status: DISCONTINUED | OUTPATIENT
Start: 2019-12-17 | End: 2019-12-18

## 2019-12-17 RX ORDER — IPRATROPIUM BROMIDE AND ALBUTEROL SULFATE 2.5; .5 MG/3ML; MG/3ML
3 SOLUTION RESPIRATORY (INHALATION) ONCE
Status: COMPLETED | OUTPATIENT
Start: 2019-12-17 | End: 2019-12-17

## 2019-12-17 RX ORDER — IPRATROPIUM BROMIDE AND ALBUTEROL SULFATE 2.5; .5 MG/3ML; MG/3ML
SOLUTION RESPIRATORY (INHALATION)
Status: COMPLETED
Start: 2019-12-17 | End: 2019-12-17

## 2019-12-17 RX ORDER — METHYLPREDNISOLONE SODIUM SUCCINATE 125 MG/2ML
125 INJECTION, POWDER, LYOPHILIZED, FOR SOLUTION INTRAMUSCULAR; INTRAVENOUS ONCE
Status: COMPLETED | OUTPATIENT
Start: 2019-12-17 | End: 2019-12-18

## 2019-12-17 RX ADMIN — IPRATROPIUM BROMIDE AND ALBUTEROL SULFATE 3 ML: .5; 3 SOLUTION RESPIRATORY (INHALATION) at 23:30

## 2019-12-17 RX ADMIN — IPRATROPIUM BROMIDE AND ALBUTEROL SULFATE 3 ML: 2.5; .5 SOLUTION RESPIRATORY (INHALATION) at 23:30

## 2019-12-17 RX ADMIN — IPRATROPIUM BROMIDE AND ALBUTEROL SULFATE 3 ML: .5; 3 SOLUTION RESPIRATORY (INHALATION) at 00:42

## 2019-12-17 RX ADMIN — IPRATROPIUM BROMIDE AND ALBUTEROL SULFATE 3 ML: .5; 3 SOLUTION RESPIRATORY (INHALATION) at 00:32

## 2019-12-17 ASSESSMENT — MIFFLIN-ST. JEOR: SCORE: 1306.42

## 2019-12-18 PROBLEM — J44.1 COPD EXACERBATION (H): Status: ACTIVE | Noted: 2019-12-18

## 2019-12-18 LAB
ANION GAP SERPL CALCULATED.3IONS-SCNC: 5 MMOL/L (ref 3–14)
BUN SERPL-MCNC: 17 MG/DL (ref 7–30)
CALCIUM SERPL-MCNC: 8.9 MG/DL (ref 8.5–10.1)
CHLORIDE SERPL-SCNC: 99 MMOL/L (ref 94–109)
CO2 SERPL-SCNC: 32 MMOL/L (ref 20–32)
CREAT SERPL-MCNC: 0.83 MG/DL (ref 0.52–1.04)
FLUAV+FLUBV AG SPEC QL: NEGATIVE
FLUAV+FLUBV AG SPEC QL: NEGATIVE
GFR SERPL CREATININE-BSD FRML MDRD: 67 ML/MIN/{1.73_M2}
GLUCOSE BLDC GLUCOMTR-MCNC: 148 MG/DL (ref 70–99)
GLUCOSE SERPL-MCNC: 161 MG/DL (ref 70–99)
LMWH PPP CHRO-ACNC: 0.6 IU/ML
MAGNESIUM SERPL-MCNC: 1.8 MG/DL (ref 1.6–2.3)
POTASSIUM SERPL-SCNC: 3.4 MMOL/L (ref 3.4–5.3)
POTASSIUM SERPL-SCNC: 3.6 MMOL/L (ref 3.4–5.3)
PROCALCITONIN SERPL-MCNC: 0.05 NG/ML
SODIUM SERPL-SCNC: 136 MMOL/L (ref 133–144)
SPECIMEN SOURCE: NORMAL
TROPONIN I SERPL-MCNC: 0.05 UG/L (ref 0–0.04)
TROPONIN I SERPL-MCNC: 1.21 UG/L (ref 0–0.04)
TROPONIN I SERPL-MCNC: 1.41 UG/L (ref 0–0.04)
TROPONIN I SERPL-MCNC: 1.72 UG/L (ref 0–0.04)
TROPONIN I SERPL-MCNC: 1.73 UG/L (ref 0–0.04)
TROPONIN I SERPL-MCNC: 3.73 UG/L (ref 0–0.04)

## 2019-12-18 PROCEDURE — 93005 ELECTROCARDIOGRAM TRACING: CPT

## 2019-12-18 PROCEDURE — 96375 TX/PRO/DX INJ NEW DRUG ADDON: CPT

## 2019-12-18 PROCEDURE — 40000275 ZZH STATISTIC RCP TIME EA 10 MIN

## 2019-12-18 PROCEDURE — 25800030 ZZH RX IP 258 OP 636: Performed by: EMERGENCY MEDICINE

## 2019-12-18 PROCEDURE — 84484 ASSAY OF TROPONIN QUANT: CPT | Performed by: INTERNAL MEDICINE

## 2019-12-18 PROCEDURE — 96368 THER/DIAG CONCURRENT INF: CPT

## 2019-12-18 PROCEDURE — 25000132 ZZH RX MED GY IP 250 OP 250 PS 637: Mod: GY | Performed by: INTERNAL MEDICINE

## 2019-12-18 PROCEDURE — 25000125 ZZHC RX 250: Performed by: EMERGENCY MEDICINE

## 2019-12-18 PROCEDURE — 25000125 ZZHC RX 250: Performed by: HOSPITALIST

## 2019-12-18 PROCEDURE — 87804 INFLUENZA ASSAY W/OPTIC: CPT | Performed by: EMERGENCY MEDICINE

## 2019-12-18 PROCEDURE — 25000128 H RX IP 250 OP 636: Performed by: EMERGENCY MEDICINE

## 2019-12-18 PROCEDURE — 12000047 ZZH R&B IMCU

## 2019-12-18 PROCEDURE — 83735 ASSAY OF MAGNESIUM: CPT | Performed by: HOSPITALIST

## 2019-12-18 PROCEDURE — 27210339 ZZH CIRCUIT HUMIDITY W/CPAP BIP

## 2019-12-18 PROCEDURE — 94640 AIRWAY INHALATION TREATMENT: CPT

## 2019-12-18 PROCEDURE — 25000132 ZZH RX MED GY IP 250 OP 250 PS 637: Mod: GY | Performed by: HOSPITALIST

## 2019-12-18 PROCEDURE — 00000146 ZZHCL STATISTIC GLUCOSE BY METER IP

## 2019-12-18 PROCEDURE — 94640 AIRWAY INHALATION TREATMENT: CPT | Mod: 76

## 2019-12-18 PROCEDURE — 84132 ASSAY OF SERUM POTASSIUM: CPT | Performed by: HOSPITALIST

## 2019-12-18 PROCEDURE — 87040 BLOOD CULTURE FOR BACTERIA: CPT | Performed by: EMERGENCY MEDICINE

## 2019-12-18 PROCEDURE — 36415 COLL VENOUS BLD VENIPUNCTURE: CPT | Performed by: HOSPITALIST

## 2019-12-18 PROCEDURE — 96366 THER/PROPH/DIAG IV INF ADDON: CPT

## 2019-12-18 PROCEDURE — 85520 HEPARIN ASSAY: CPT | Performed by: HOSPITALIST

## 2019-12-18 PROCEDURE — 84484 ASSAY OF TROPONIN QUANT: CPT | Performed by: HOSPITALIST

## 2019-12-18 PROCEDURE — 25000125 ZZHC RX 250: Performed by: INTERNAL MEDICINE

## 2019-12-18 PROCEDURE — 94660 CPAP INITIATION&MGMT: CPT

## 2019-12-18 PROCEDURE — 36415 COLL VENOUS BLD VENIPUNCTURE: CPT | Performed by: INTERNAL MEDICINE

## 2019-12-18 PROCEDURE — 99222 1ST HOSP IP/OBS MODERATE 55: CPT | Performed by: INTERNAL MEDICINE

## 2019-12-18 PROCEDURE — 96365 THER/PROPH/DIAG IV INF INIT: CPT

## 2019-12-18 PROCEDURE — 25000128 H RX IP 250 OP 636: Performed by: HOSPITALIST

## 2019-12-18 PROCEDURE — 93010 ELECTROCARDIOGRAM REPORT: CPT | Performed by: INTERNAL MEDICINE

## 2019-12-18 PROCEDURE — 99223 1ST HOSP IP/OBS HIGH 75: CPT | Mod: AI | Performed by: HOSPITALIST

## 2019-12-18 PROCEDURE — G0463 HOSPITAL OUTPT CLINIC VISIT: HCPCS

## 2019-12-18 PROCEDURE — 12000000 ZZH R&B MED SURG/OB

## 2019-12-18 RX ORDER — POTASSIUM CHLORIDE 7.45 MG/ML
10 INJECTION INTRAVENOUS
Status: DISCONTINUED | OUTPATIENT
Start: 2019-12-18 | End: 2019-12-21

## 2019-12-18 RX ORDER — POTASSIUM CHLORIDE 29.8 MG/ML
20 INJECTION INTRAVENOUS
Status: DISCONTINUED | OUTPATIENT
Start: 2019-12-18 | End: 2019-12-21

## 2019-12-18 RX ORDER — POTASSIUM CL/LIDO/0.9 % NACL 10MEQ/0.1L
10 INTRAVENOUS SOLUTION, PIGGYBACK (ML) INTRAVENOUS
Status: DISCONTINUED | OUTPATIENT
Start: 2019-12-18 | End: 2019-12-21

## 2019-12-18 RX ORDER — AMOXICILLIN 250 MG
1 CAPSULE ORAL 2 TIMES DAILY
Status: DISCONTINUED | OUTPATIENT
Start: 2019-12-18 | End: 2019-12-27 | Stop reason: HOSPADM

## 2019-12-18 RX ORDER — NALOXONE HYDROCHLORIDE 0.4 MG/ML
.1-.4 INJECTION, SOLUTION INTRAMUSCULAR; INTRAVENOUS; SUBCUTANEOUS
Status: DISCONTINUED | OUTPATIENT
Start: 2019-12-18 | End: 2019-12-23

## 2019-12-18 RX ORDER — METHYLPREDNISOLONE SODIUM SUCCINATE 125 MG/2ML
60 INJECTION, POWDER, LYOPHILIZED, FOR SOLUTION INTRAMUSCULAR; INTRAVENOUS EVERY 6 HOURS
Status: DISCONTINUED | OUTPATIENT
Start: 2019-12-18 | End: 2019-12-21

## 2019-12-18 RX ORDER — POTASSIUM CHLORIDE 1500 MG/1
20-40 TABLET, EXTENDED RELEASE ORAL
Status: DISCONTINUED | OUTPATIENT
Start: 2019-12-18 | End: 2019-12-21

## 2019-12-18 RX ORDER — IPRATROPIUM BROMIDE AND ALBUTEROL SULFATE 2.5; .5 MG/3ML; MG/3ML
1 SOLUTION RESPIRATORY (INHALATION) EVERY 4 HOURS
Status: DISCONTINUED | OUTPATIENT
Start: 2019-12-18 | End: 2019-12-18

## 2019-12-18 RX ORDER — AMOXICILLIN 250 MG
2 CAPSULE ORAL 2 TIMES DAILY
Status: DISCONTINUED | OUTPATIENT
Start: 2019-12-18 | End: 2019-12-27 | Stop reason: HOSPADM

## 2019-12-18 RX ORDER — POTASSIUM CHLORIDE 1.5 G/1.58G
20-40 POWDER, FOR SOLUTION ORAL
Status: DISCONTINUED | OUTPATIENT
Start: 2019-12-18 | End: 2019-12-21

## 2019-12-18 RX ORDER — LIDOCAINE 40 MG/G
CREAM TOPICAL
Status: DISCONTINUED | OUTPATIENT
Start: 2019-12-18 | End: 2019-12-22

## 2019-12-18 RX ORDER — ONDANSETRON 2 MG/ML
4 INJECTION INTRAMUSCULAR; INTRAVENOUS EVERY 6 HOURS PRN
Status: DISCONTINUED | OUTPATIENT
Start: 2019-12-18 | End: 2019-12-27 | Stop reason: HOSPADM

## 2019-12-18 RX ORDER — ACETAMINOPHEN 650 MG/1
650 SUPPOSITORY RECTAL EVERY 4 HOURS PRN
Status: DISCONTINUED | OUTPATIENT
Start: 2019-12-18 | End: 2019-12-23

## 2019-12-18 RX ORDER — IPRATROPIUM BROMIDE AND ALBUTEROL SULFATE 2.5; .5 MG/3ML; MG/3ML
3 SOLUTION RESPIRATORY (INHALATION) CONTINUOUS PRN
Status: COMPLETED | OUTPATIENT
Start: 2019-12-18 | End: 2019-12-17

## 2019-12-18 RX ORDER — HEPARIN SODIUM 10000 [USP'U]/100ML
650 INJECTION, SOLUTION INTRAVENOUS CONTINUOUS
Status: DISCONTINUED | OUTPATIENT
Start: 2019-12-18 | End: 2019-12-18

## 2019-12-18 RX ORDER — ONDANSETRON 4 MG/1
4 TABLET, ORALLY DISINTEGRATING ORAL EVERY 6 HOURS PRN
Status: DISCONTINUED | OUTPATIENT
Start: 2019-12-18 | End: 2019-12-27 | Stop reason: HOSPADM

## 2019-12-18 RX ORDER — HYDRALAZINE HYDROCHLORIDE 20 MG/ML
10 INJECTION INTRAMUSCULAR; INTRAVENOUS EVERY 4 HOURS PRN
Status: DISCONTINUED | OUTPATIENT
Start: 2019-12-18 | End: 2019-12-25

## 2019-12-18 RX ORDER — MAGNESIUM SULFATE HEPTAHYDRATE 40 MG/ML
4 INJECTION, SOLUTION INTRAVENOUS EVERY 4 HOURS PRN
Status: DISCONTINUED | OUTPATIENT
Start: 2019-12-18 | End: 2019-12-27 | Stop reason: HOSPADM

## 2019-12-18 RX ORDER — PREDNISONE 1 MG/1
.5-3 TABLET ORAL DAILY
Status: ON HOLD | COMMUNITY
Start: 2019-12-15 | End: 2019-12-27

## 2019-12-18 RX ORDER — IPRATROPIUM BROMIDE AND ALBUTEROL SULFATE 2.5; .5 MG/3ML; MG/3ML
3 SOLUTION RESPIRATORY (INHALATION)
Status: DISCONTINUED | OUTPATIENT
Start: 2019-12-18 | End: 2019-12-21

## 2019-12-18 RX ORDER — ACETAMINOPHEN 325 MG/1
650 TABLET ORAL EVERY 4 HOURS PRN
Status: DISCONTINUED | OUTPATIENT
Start: 2019-12-18 | End: 2019-12-23

## 2019-12-18 RX ORDER — LIDOCAINE 40 MG/G
CREAM TOPICAL
Status: DISCONTINUED | OUTPATIENT
Start: 2019-12-18 | End: 2019-12-18

## 2019-12-18 RX ORDER — ASPIRIN 81 MG/1
81 TABLET ORAL DAILY
Status: DISCONTINUED | OUTPATIENT
Start: 2019-12-18 | End: 2019-12-27 | Stop reason: HOSPADM

## 2019-12-18 RX ORDER — CODEINE PHOSPHATE AND GUAIFENESIN 10; 100 MG/5ML; MG/5ML
5 SOLUTION ORAL EVERY 6 HOURS PRN
Status: DISCONTINUED | OUTPATIENT
Start: 2019-12-19 | End: 2019-12-27 | Stop reason: HOSPADM

## 2019-12-18 RX ORDER — NITROGLYCERIN 0.4 MG/1
0.4 TABLET SUBLINGUAL EVERY 5 MIN PRN
Status: DISCONTINUED | OUTPATIENT
Start: 2019-12-18 | End: 2019-12-22

## 2019-12-18 RX ORDER — ALBUTEROL SULFATE 0.83 MG/ML
2.5 SOLUTION RESPIRATORY (INHALATION) EVERY 4 HOURS PRN
Status: DISCONTINUED | OUTPATIENT
Start: 2019-12-18 | End: 2019-12-25

## 2019-12-18 RX ORDER — LEVOFLOXACIN 500 MG/1
500 TABLET, FILM COATED ORAL DAILY
Status: ON HOLD | COMMUNITY
Start: 2019-12-14 | End: 2019-12-23

## 2019-12-18 RX ADMIN — SODIUM CHLORIDE 1000 ML: 9 INJECTION, SOLUTION INTRAVENOUS at 00:46

## 2019-12-18 RX ADMIN — ALBUTEROL SULFATE 2.5 MG: 2.5 SOLUTION RESPIRATORY (INHALATION) at 23:30

## 2019-12-18 RX ADMIN — SENNOSIDES AND DOCUSATE SODIUM 1 TABLET: 8.6; 5 TABLET ORAL at 11:32

## 2019-12-18 RX ADMIN — METHYLPREDNISOLONE SODIUM SUCCINATE 62.5 MG: 125 INJECTION, POWDER, FOR SOLUTION INTRAMUSCULAR; INTRAVENOUS at 17:20

## 2019-12-18 RX ADMIN — ALBUTEROL SULFATE 2.5 MG: 2.5 SOLUTION RESPIRATORY (INHALATION) at 15:39

## 2019-12-18 RX ADMIN — IPRATROPIUM BROMIDE AND ALBUTEROL SULFATE 3 ML: .5; 3 SOLUTION RESPIRATORY (INHALATION) at 15:21

## 2019-12-18 RX ADMIN — METHYLPREDNISOLONE SODIUM SUCCINATE 62.5 MG: 125 INJECTION, POWDER, FOR SOLUTION INTRAMUSCULAR; INTRAVENOUS at 06:40

## 2019-12-18 RX ADMIN — SODIUM CHLORIDE 1000 ML: 9 INJECTION, SOLUTION INTRAVENOUS at 03:54

## 2019-12-18 RX ADMIN — METHYLPREDNISOLONE SODIUM SUCCINATE 62.5 MG: 125 INJECTION, POWDER, FOR SOLUTION INTRAMUSCULAR; INTRAVENOUS at 11:39

## 2019-12-18 RX ADMIN — IPRATROPIUM BROMIDE AND ALBUTEROL SULFATE 3 ML: .5; 3 SOLUTION RESPIRATORY (INHALATION) at 07:01

## 2019-12-18 RX ADMIN — ALBUTEROL SULFATE 2.5 MG: 2.5 SOLUTION RESPIRATORY (INHALATION) at 00:12

## 2019-12-18 RX ADMIN — IPRATROPIUM BROMIDE AND ALBUTEROL SULFATE 3 ML: .5; 3 SOLUTION RESPIRATORY (INHALATION) at 10:11

## 2019-12-18 RX ADMIN — CEFEPIME HYDROCHLORIDE 2 G: 2 INJECTION, POWDER, FOR SOLUTION INTRAVENOUS at 23:49

## 2019-12-18 RX ADMIN — HEPARIN SODIUM 800 UNITS/HR: 10000 INJECTION, SOLUTION INTRAVENOUS at 05:14

## 2019-12-18 RX ADMIN — CEFEPIME HYDROCHLORIDE 2 G: 2 INJECTION, POWDER, FOR SOLUTION INTRAVENOUS at 11:39

## 2019-12-18 RX ADMIN — ENOXAPARIN SODIUM 40 MG: 40 INJECTION SUBCUTANEOUS at 03:54

## 2019-12-18 RX ADMIN — METHYLPREDNISOLONE SODIUM SUCCINATE 62.5 MG: 125 INJECTION, POWDER, FOR SOLUTION INTRAMUSCULAR; INTRAVENOUS at 23:49

## 2019-12-18 RX ADMIN — ASPIRIN 81 MG: 81 TABLET, COATED ORAL at 13:48

## 2019-12-18 RX ADMIN — METHYLPREDNISOLONE SODIUM SUCCINATE 125 MG: 125 INJECTION, POWDER, FOR SOLUTION INTRAMUSCULAR; INTRAVENOUS at 00:46

## 2019-12-18 RX ADMIN — IPRATROPIUM BROMIDE AND ALBUTEROL SULFATE 3 ML: .5; 3 SOLUTION RESPIRATORY (INHALATION) at 19:13

## 2019-12-18 RX ADMIN — CEFEPIME HYDROCHLORIDE 2 G: 2 INJECTION, POWDER, FOR SOLUTION INTRAVENOUS at 00:48

## 2019-12-18 RX ADMIN — VANCOMYCIN HYDROCHLORIDE 1500 MG: 5 INJECTION, POWDER, LYOPHILIZED, FOR SOLUTION INTRAVENOUS at 01:32

## 2019-12-18 RX ADMIN — ALBUTEROL SULFATE 2.5 MG: 2.5 SOLUTION RESPIRATORY (INHALATION) at 10:35

## 2019-12-18 ASSESSMENT — ACTIVITIES OF DAILY LIVING (ADL)
ADLS_ACUITY_SCORE: 17
ADLS_ACUITY_SCORE: 19
ADLS_ACUITY_SCORE: 15
ADLS_ACUITY_SCORE: 19
ADLS_ACUITY_SCORE: 15

## 2019-12-18 ASSESSMENT — MIFFLIN-ST. JEOR: SCORE: 1307.63

## 2019-12-18 NOTE — PROGRESS NOTES
Patient on 5L oxymizer, sating fine.  Lung sounds diminished, faint exp. Wheezes.  Getting scheduled nebs.  RCAT done.  Congested cough.  Productive at times.  Aerobika ordered.  BiPAP on standby.  Continue to follow.

## 2019-12-18 NOTE — ED TRIAGE NOTES
Increased dyspnea since Saturday, typically wears 4L at night however requires oxygen all day today.  SPO2 83% room air upon ED arrival.  History of COPD.

## 2019-12-18 NOTE — ED PROVIDER NOTES
"  History     Chief Complaint:  Shortness of Breath      HPI   Nena Sanders is a 79 year old female with a history of COPD on home oxygen who presents to the emergency department with her friend for evaluation of shortness of breath. The patient reports increased dyspnea since Saturday. The patient reports never being this short of breath. Of note she was started on Levaquin on Saturday for productive cough with green sputum.  She denies any chest pain, nausea, vomiting, diarrhea, abdominal pain.  She reports that her shortness of breath is worse than previous presentations.  History is limited by acuity of presentation.    Allergies:  No known drug allergies    Medications:    albuterol  fluticasone-salmeterol   Robitussin  duoneb   prednisone  triamterene-HCTZ     Past Medical History:    Arthritis  Chronic obstructive lung disease  COPD  Macular degeneration  Nocturnal hypoxia  Osteopenia  Oxygen dependent  Pulmonary nodules   sleep apnea    Past Surgical History:    Anterior hip arthroplasty bilateral  Bilateral cataract extraction  Pr pinc/aspir breast cyst benign  Tonsillectomy    Family History:    History reviewed. No pertinent family history.     Social History:  Smoking status: never smoker  Alcohol use: yes  Drug use: no  The patient presents to the emergency department with her friend.  PCP: Damon Leon  Marital Status:  Single       Review of Systems   All other systems reviewed and are negative.      Physical Exam     Patient Vitals for the past 24 hrs:   BP Temp Pulse Heart Rate Resp SpO2 Height Weight   12/18/19 0100 138/75 -- 124 129 17 100 % -- --   12/18/19 0045 (!) 161/79 -- 136 134 -- 100 % -- --   12/18/19 0030 -- -- -- 137 25 100 % -- --   12/18/19 0015 (!) 169/94 -- 146 146 19 100 % -- --   12/18/19 0000 -- -- 146 142 30 100 % -- --   12/17/19 2330 -- -- -- 123 25 100 % -- --   12/17/19 2320 (!) 174/86 100  F (37.8  C) -- 116 22 -- 1.702 m (5' 7\") 79.4 kg (175 lb)   12/17/19 2315 " (!) 174/86 -- -- 120 14 99 % -- --   12/17/19 2302 -- -- -- -- -- (!) 83 % -- --       Physical Exam  General: Sitting upright on the bed, appears uncomfortable.  Head: No obvious trauma to head.  Ears, Nose, Throat:  External ears normal.  Nose normal.    Eyes:  Conjunctivae clear.  Pupils are equal, round, and reactive.   Neck: Normal range of motion.  Neck supple.   CV: Regular rate and rhythm.  No murmurs.      Respiratory: Tachypneic, retracting, diminished breath sounds on the left side compared to the right.  Right-sided with wheezing appreciable.  Gastrointestinal: Soft.  No distension. There is no tenderness.    Musculoskeletal: Non tender non edematous calves  Skin: Skin is warm and dry.  No rash noted.     Emergency Department Course   ECG (00:26:13):  Rate 143 bpm. AK interval *. QRS duration 96. QT/QTc 284/438. P-R-T axes 158 137 82. Suspect arm lead reversal, interpretation assumes no reversal. Suspect sinus tachycardia. Left posterior fascicular block. Nonspecific ST abnormality rate related changes. Abnormal ECG Interpreted at 0035 by Millie Shepard MD.    Imaging:  Radiographic findings were communicated with the patient who voiced understanding of the findings.  XR Chest 1 view, portable:   IMPRESSION: No evidence for acute focal alveolar infiltrate or consolidation. Normal heart size. Normal pulmonary vascularity. Stable opacity involving the left lung base representing a prominent nipple shadow. No evidence for pneumothorax. Minimal   degenerative changes in the left acromioclavicular joint and spine. as per radiology.    Laboratory:  CBC: WBC: 8.5, HGB: 13.3, PLT: 204  BMP: Glucose 161 (H), o/w WNL (Creatinine: 0.83)  ISTAT VBG: pH 7.30 (L) / PCO2 65 (H) / PO2 31 / Bicarb 32 (H) / O2 sat 50 / lactic acid 1.6  2349 Troponin: 0.054 (H)  Procalcitonin: 0.05  Influenza A/B antigen: Negative  Blood culture: Pending    Interventions:  2330 duoneb 3 mL  0012 albuterol 2.5 mg Nebulization  0046  solu-medrol 125 mg IV  0046 NS 1000 mL IV  0048 cefepime 2 g IV  0132 Vancomycin 1500 mg IV    Emergency Department Course:  Nursing notes and vitals reviewed. (2952) I performed an exam of the patient as documented above.     IV inserted. Medicine administered as documented above. Blood drawn. This was sent to the lab for further testing, results above.     The patient was sent for a chest xray while in the emergency department, findings above.     An EKG was recorded. Results as noted above.     0030 I rechecked the patient and discussed the results of her workup thus far.     0112  I consulted with Dr. Boothe of the hospitalist services. He is in agreement to accept the patient for admission.    Findings and plan explained to the Patient who consents to admission. Discussed the patient with Dr. Boothe, who will admit the patient to a [facility] bed for further monitoring, evaluation, and treatment.      Impression & Plan      Medical Decision Makin-year-old female with history of COPD presents with shortness of breath.  Vital signs tachycardic, hypertensive and hypoxic on room air.  Broad differential was pursued including not limited to pneumonia, pneumothorax, effusion, PE, arrhythmia, electrolyte, metabolic, renal dysfunction, anemia, etc.  I was concerned about possible pneumothorax and did a quick bedside ultrasound that showed no evidence of pneumothorax.  Chest x-ray confirms no evidence acute pneumonia, pneumothorax, effusion.  CBC shows no leukocytosis or anemia.  BMP shows mild hyperglycemia without evidence of acute electrolyte, metabolic, renal dysfunction.  ABG shows mildly acidotic at 7.3 with hypercapnia at 65.  Consistent with acute respiratory failure with hypoxia and hypercapnia.  Lactate normal.  Not suggestive of severe sepsis or septic shock.  Procalcitonin is detectable but within normal range.  Given patient has been on Levaquin and having worsening shortness of breath did broaden her  antibiotics to vancomycin and cefepime.  Solu-Medrol administered as well.  Influenza swab negative.  Blood cultures pending.  EKG shows suspected sinus tachycardia without evidence of acute ST changes.  There is some rate related changes but otherwise nothing else acute.  On the telemetry strip this looks like sinus rhythm as opposed to arrhythmia.  Mild troponin elevation at 0.054 which I suspect is demand in setting of severe COPD exacerbation.  I did consider PE but patient has known COPD, hypercapnia, hypoxia, wheezing, increased work of breathing, this all seems most consistent with a COPD exacerbation.  After pneumothorax was ruled out patient was placed on BiPAP.  She received 4 nebs as well.  After she was on BiPAP she continued to look better.  Her breathing became more easy.  She was able to speak in longer sentences.  She felt improved.  Patient will be admitted to the [facility].    Critical Care time:  was 45 minutes for this patient excluding procedures.    Diagnosis:    ICD-10-CM    1. Chronic obstructive pulmonary disease with acute exacerbation (H) J44.1    2. Acute and chronic respiratory failure with hypercapnia (H) J96.22        Disposition:  Admitted to [facility]    Donaldo Rosario  12/17/2019    EMERGENCY DEPARTMENT  Scribe Disclosure:  Donaldo REID, am serving as a scribe at 11:36 PM on 12/17/2019 to document services personally performed by Millie Shepard MD based on my observations and the provider's statements to me.        Millie Shepard MD  12/18/19 [id number]

## 2019-12-18 NOTE — PROVIDER NOTIFICATION
"Dr. Gallego text-paged, \"Pt tolerating being off bipap for 2+ hours. Requesting diet orders. Also, daughter would like to be here when you round and has flexible schedule. ETA?\"  "

## 2019-12-18 NOTE — PROGRESS NOTES
[facility] RCAT Note    Date: 12/18/19  Admission Diagnosis: COPD exacerbation  Pulmonary History: none  Home Nebulizer/ MDI Use:  yes  Home Oxygen Use: 4L O2 at NOC  Acuity Level (from RT Assessment flow sheet): 3    Aerosol Therapy Initiated:  Continue QID Duoneb     Pulmonary Hygiene Initiated:  Aerobika QID    Volume Expansion Therapy Initiated:      Current Oxygen Requirement: 5L Oxymizer  Current SpO2: 96%    Re-evaluation date: 12/21/9    See 'RT Assessments' flow sheet for patient assessment scoring and Acuity Level Details.

## 2019-12-18 NOTE — PROGRESS NOTES
Pt had episode of SOB/ increased work of breathing this PM. Scheduled neb tx given, pt still complained SOB then prn Albuterol administered. Pt placed on BiPAP of 12/ 6 back up rate of 12, and 40%.  Breath sounds were course / wheezing bilateral. Will continue to follow. Alarm value set to 10. Gel pad/ mepilex in place.     Jaime Briscoe, RT on 12/18/2019 at 4:37 PM

## 2019-12-18 NOTE — PLAN OF CARE
Patient is alert and oriented x 4, tolerating Bipap.  99 %. Gets SOB off Bipap. Breath sound diminished.   Pt had a rise in troponin, denied chest pain. Started on heparin infusion.   Repeat troponin pending.

## 2019-12-18 NOTE — PROVIDER NOTIFICATION
Dr. Boothe was notified of rise in pt troponin from 0.054 to 1.212. pt denied chest pain.  Orders placed for Stat EKG, heparin, and repeat troponin at 7 am.

## 2019-12-18 NOTE — PHARMACY-ADMISSION MEDICATION HISTORY
Pharmacy Medication History  Admission medication history interview status for the 12/17/2019  admission is complete. See EPIC admission navigator for prior to admission medications     Medication history sources: Chart review. Face to face interview with patient.   Medication history source reliability: Good  Adherence assessment: Good    Significant changes made to the medication list:  Recently prescribed Levaquin & Prednisone taper added.      Medication reconciliation completed by provider prior to medication history? No    Time spent in this activity: 10 minutes      Prior to Admission medications    Medication Sig Last Dose Taking? Auth Provider   levofloxacin (LEVAQUIN) 500 MG tablet Take 500 mg by mouth daily 12/17/2019 at am Yes Unknown, Entered By History   predniSONE (DELTASONE) 1 MG tablet Take 0.5-3 mg by mouth daily 3mg daily x3 days, then 2mg daily x3 days, then 1mg daily x3 days, then 0/5mg daily x3 days, then stop. 12/17/2019 at am Yes Unknown, Entered By History   guaiFENesin-codeine (ROBITUSSIN AC) 100-10 MG/5ML SOLN Take 1 tsp. by mouth daily as needed  12/16/2019 at hs  Reported, Patient   triamterene-HCTZ (DYAZIDE) 37.5-25 MG capsule Take 1 capsule by mouth daily 12/16/2019 at am  Unknown, Entered By History

## 2019-12-18 NOTE — PROVIDER NOTIFICATION
Paged hospitalist of troponin 1.413     Orders received for serial lab collect troponins. Patient denies any chest pain, nausea, diaphoresis. Heparin gtt infusing as ordered. AM Mag and K within normal range. Tele NSR to Sinus tach

## 2019-12-18 NOTE — PROVIDER NOTIFICATION
Paged Dr Gallego to clarify if heparin gtt should be kept running. Also can you clarify goal O2 sats?     -received orders to stop heparin drip. ASA ordered by cardiology   -orders to keep O2 > 90 %

## 2019-12-18 NOTE — CONSULTS
[facility]    Cardiology Consultation     Date of Admission:  12/17/2019    Assessment & Plan   Nena Sanders is a 79 year old female who was admitted on 12/17/2019.    1.  Elevated troponin  Patient has had mildly elevated troponin in setting of severe respiratory distress and failure requiring BiPAP with oxygen saturation of 83% on room air.  This respiratory distress is likely due to COPD exacerbation with possible superimposed infection.  I believe the elevated troponin is likely related to the hypoxia and respiratory failure rather than true acute coronary syndrome.  I have discussed that with the patient in detail.  We did talk about the possibility of underlying CAD which we cannot entirely exclude.  To assess that we might need to do stress nuclear study or angiography.  She is averse to doing a chemical stress test as she had a bad experience and given ongoing wheezing, use of adenosine or Lexiscan would be problematic.  We talked about invasive angiography but she is averse to taking the risk at this time.  She understands that although we cannot be entirely sure, this most likely from her respiratory failure and COPD exacerbation.  For now, I have recommended an echocardiogram.  If LV function is abnormal, we might consider more definitive cardiac evaluation.  I might expect some pulmonary hypertension RV abnormalities given her underlying COPD.  She is comfortable with this plan.  Meanwhile, I would like to optimize medical management.  I am recommending a baby aspirin.  We should also check her lipid profile is abnormal, she may benefit from a statin.  With respect to IV heparin, acute coronary syndrome appears less likely and therefore, may not be necessary as she is not having any active chest pain.  I will defer to the hospitalist to decide on discontinuing it now or tomorrow.  We will follow after the echocardiogram.    2.  COPD exacerbation with bronchitis, management per  hospitalist  She has abnormal PFTs for a long time.  She tells me that her pulmonary function is only 30% of predicted.      Sanford Polo MD    Primary Care Physician   Damon Leon    Reason for Consult   Reason for consult: I was asked by hospitalist to evaluate this patient for elevated troponin.    History of Present Illness   Nena Sanders is a 79 year old female with history of COPD, O2 dependent at night, pulmonary nodules, sleep apnea, HTN, and macular degeneration who presented to the ED with SOB.  This started about 3 days ago and is associated with productive cough of green sputum.  No fever/chills/vomiting at home.  Her breathing appears to continue to get worse over the last 24 hours to the point that she was unable to do anything and therefore came to the emergency room.  Here she was put on BiPAP and some relief.  She is now started on antibiotics and inhalers and nebs.  She is also on steroids.      Her initial troponin was  0.054 which increased to 1.2 and now increased to 1.7.  However she has no chest pain.  She has significant hypoxia on admission with tachypnea and tachycardia.    Her oxygen saturation was 83% on room air on admission.    EKG was reviewed by me revealed sinus rhythm without ischemic changes.     She has no history of coronary artery disease.  In fact she had attempted a stress exercise test in 2011 but could not get good images on echo.  Subsequently nuclear perfusion was done which was normal.  She had a bad experience with a chemical stress nuclear study and therefore does not want to pursue it if possible.       Patient Active Problem List   Diagnosis     Right hip pain     Left hip pain     COPD exacerbation (H)       Past Medical History   I have reviewed this patient's medical history and updated it with pertinent information if needed.   Past Medical History:   Diagnosis Date     Arthritis     hip     Chronic obstructive lung disease (H)      Chronic  rhinitis      COPD (chronic obstructive pulmonary disease) (H)      Macular degeneration     wet     Nocturnal hypoxia      Osteopenia      Oxygen dependent     4L/nc at night and as needed during the day     Pulmonary nodules      Radius fracture      Sleep apnea     does not use cpap       Past Surgical History   I have reviewed this patient's surgical history and updated it with pertinent information if needed.  Past Surgical History:   Procedure Laterality Date     ARTHROPLASTY HIP ANTERIOR Right 12/16/2015    Procedure: ARTHROPLASTY HIP ANTERIOR;  Surgeon: Alf Rivera MD;  Location: [facility] OR     ARTHROPLASTY HIP ANTERIOR Left 1/26/2016    Procedure: ARTHROPLASTY HIP ANTERIOR;  Surgeon: Alf Rivera MD;  Location: [facility] OR     COLONOSCOPY       EYE SURGERY      bilat cataracts     Pr punc/aspir breast cyst benign       TONSILLECTOMY      AGE 11       Prior to Admission Medications   Prior to Admission Medications   Prescriptions Last Dose Informant Patient Reported? Taking?   guaiFENesin-codeine (ROBITUSSIN AC) 100-10 MG/5ML SOLN 12/16/2019 at hs Self Yes No   Sig: Take 1 tsp. by mouth daily as needed    levofloxacin (LEVAQUIN) 500 MG tablet 12/17/2019 at am Self Yes Yes   Sig: Take 500 mg by mouth daily   predniSONE (DELTASONE) 1 MG tablet 12/17/2019 at am Self Yes Yes   Sig: Take 0.5-3 mg by mouth daily 3mg daily x3 days, then 2mg daily x3 days, then 1mg daily x3 days, then 0/5mg daily x3 days, then stop.   triamterene-HCTZ (DYAZIDE) 37.5-25 MG capsule 12/16/2019 at am Self Yes No   Sig: Take 1 capsule by mouth daily      Facility-Administered Medications: None     Current Facility-Administered Medications   Medication Dose Route Frequency     ceFEPIme (MAXIPIME) IV  2 g Intravenous Q12H     ipratropium - albuterol 0.5 mg/2.5 mg/3 mL  1 vial Nebulization Q4H     methylPREDNISolone  62.5 mg Intravenous Q6H     senna-docusate  1 tablet Oral BID    Or     senna-docusate  2 tablet Oral BID     sodium chloride  "(PF)  3 mL Intracatheter Q8H     Current Facility-Administered Medications   Medication Last Rate     HEParin 800 Units/hr (12/18/19 6292)     sodium chloride 1,000 mL (12/18/19 6298)     Allergies   No Known Allergies    Social History    reports that she has never smoked. She does not have any smokeless tobacco history on file. She reports current alcohol use. She reports that she does not use drugs.    Family History   Both parents had emphysema.  Her father used to work with [employer] play some chemical which she believes may have caused her emphysema as it did for her parents.    Review of Systems   The comprehensive 10 point Review of Systems is negative other than noted in the HPI or here.     Physical Exam   Vital Signs with Ranges  Temp:  [98  F (36.7  C)-100  F (37.8  C)] 98.3  F (36.8  C)  Pulse:  [] 105  Heart Rate:  [] 105  Resp:  [12-30] 18  BP: (138-174)/(69-94) 146/73  FiO2 (%):  [40 %] 40 %  SpO2:  [83 %-100 %] 96 %  Wt Readings from Last 4 Encounters:   12/18/19 79.5 kg (175 lb 4.3 oz)   01/26/19 86.2 kg (190 lb)   01/26/16 86.2 kg (190 lb)   12/16/15 86.6 kg (191 lb)     I/O last 3 completed shifts:  In: 1000 [IV Piggyback:1000]  Out: -       Vitals: BP (!) 146/73   Pulse 105   Temp 98.3  F (36.8  C) (Oral)   Resp 18   Ht 1.702 m (5' 7\")   Wt 79.5 kg (175 lb 4.3 oz)   SpO2 96%   BMI 27.45 kg/m      Constitutional:   mild distress   Eyes:   extra-ocular muscles intact   ENT:   atramatic   Neck:   no jugular venous distension   Hematologic / Lymphatic:   no cervical lymphadenopathy   Back:   symmetric   Lungs:   Bilateral extensive wheezing and decreased air entry lower zones   Cardiovascular:   normal S1 and S2 and no murmur noted   Abdomen:   non-distended and non-tender     Neurologic:   Motor Exam:  moves all extremities well and symmetrically   Neuropsychiatric:   Orientation: oriented to self, place, time and situation   Skin:   no lesions       Recent Labs   Lab " 12/18/19  0618 12/18/19  0340 12/17/19  2349   TROPI 1.413* 1.212* 0.054*       Recent Labs   Lab 12/18/19  0618 12/18/19  0340 12/17/19  2349   WBC  --   --  8.5   HGB  --   --  13.3   MCV  --   --  91   PLT  --   --  204   NA  --   --  136   POTASSIUM 3.6  --  3.4   CHLORIDE  --   --  99   CO2  --   --  32   BUN  --   --  17   CR  --   --  0.83   GFRESTIMATED  --   --  67   GFRESTBLACK  --   --  78   ANIONGAP  --   --  5   TANISHA  --   --  8.9   GLC  --   --  161*   TROPI 1.413* 1.212* 0.054*     No results for input(s): CHOL, HDL, LDL, TRIG, CHOLHDLRATIO in the last 64094 hours.  Recent Labs   Lab 12/17/19  2349   WBC 8.5   HGB 13.3   HCT 41.5   MCV 91        Recent Labs   Lab 12/17/19  2345   PHV 7.30*   PO2V 31   PCO2V 65*   HCO3V 32*     No results for input(s): NTBNPI, NTBNP in the last 168 hours.  No results for input(s): DD in the last 168 hours.  No results for input(s): SED, CRP in the last 168 hours.  Recent Labs   Lab 12/17/19  2349        No results for input(s): TSH in the last 168 hours.  No results for input(s): COLOR, APPEARANCE, URINEGLC, URINEBILI, URINEKETONE, SG, UBLD, URINEPH, PROTEIN, UROBILINOGEN, NITRITE, LEUKEST, RBCU, WBCU in the last 168 hours.    Imaging:  Recent Results (from the past 48 hour(s))   XR Chest Port 1 View    Narrative    EXAM: XR CHEST PORT 1 VW  LOCATION: [facility]  DATE/TIME: 12/17/2019 11:51 PM    INDICATION: Cough  COMPARISON: 01/26/2019      Impression    IMPRESSION: No evidence for acute focal alveolar infiltrate or consolidation. Normal heart size. Normal pulmonary vascularity. Stable opacity involving the left lung base representing a prominent nipple shadow. No evidence for pneumothorax. Minimal   degenerative changes in the left acromioclavicular joint and spine.       Echo:  No results found for this or any previous visit (from the past 4320 hour(s)).

## 2019-12-18 NOTE — H&P
[facility]    History and Physical - Hospitalist Service       Date of Admission:  12/17/2019    Assessment & Plan   Nena Sanders is a 79 year old female with history of COPD, O2 dependent at night, pulmonary nodules, sleep apnea, HTN, and macular degeneration who presented to the ED with SOB. She is being admitted and treated for COPD exacerbation.    COPD exacerbation  Acute on chronic hypoxic respiratory failure  History of pulmonary nodules  History of sleep apnea (mild) and nocturnal O2 dependent  Started 3-4 days ago, productive sputum, no F/C, increasing SOB and difficulty speaking in full sentences. Started on levofloxacin and prednisone burst/taper per Pulm MD on 12/14. Worsening SOB brought her in today. Flu negative, CXR no acute infiltrates, WBC normal. Blood cultures pending. Bipap in ED improving her symptoms. Does not wear CPAP at home nightly.  - vancomycin and cefepime started in ED, will continue for now  - Duonebs q4 and prn albuterol q 4 prn in between   - pta guaifenesin prn in place  - incentive spirometry as able  - continue bipap tonight, if remains stable, can give a break and do some ice chips, maybe water  - Goal O2 >89% for tonight  - stat ABG if she becomes less responsive or obtunded    Tachycardia  EKG read as a flutter, appears to be sinus tach in discussion with ER MD. Denies chest pain. Likely secondary to nebs at least in part.  - telemetry continued    Elevated troponin, likely demand ischemia, technically T2 NSTEMI  No CAD history  - initial trop 0.054  - repeat EKG, troponin  Addendum 04:15 - trop repeat 1.2, no chest pain or new symptoms per RN, will get stat EKG, start heparin, and order repeat trop for 7am.  Cardiology consultation placed.  -- Discussed with RN.      HTN  Initially 160s-170s in ED.  - PTA regimen to continue when confirmed  - prn hydralazine    Diet: npo while on bipap  DVT Prophylaxis: Enoxaparin (Lovenox) SQ  Buenrostro Catheter: not  present  Code Status: Full Code - confirmed upon admission    Disposition Plan   Expected discharge: 2-3 days pending improvement  Entered: Shade Boothe MD 12/18/2019, 1:07 AM     The patient's care was discussed with the Patient and ER MD.    Shade Boothe MD  [facility]    ______________________________________________________________________    Chief Complaint   SOB    History is obtained from the patient    History of Present Illness   Nena Sanders is a 79 year old female with history of COPD, O2 dependent at night, pulmonary nodules, sleep apnea, HTN, and macular degeneration who presented to the ED with SOB.  This started about 3 days ago and is associated with productive cough of green sputum.  No fever/chills/vomiting at home. No palpations or chest pain. She was started on levofloxacin on 12/14 as well as a prednisone burst and taper per her pulmonologist. Last COPD exacerbation was a year ago - she is unsure of any triggers. No known sick contacts, no recent travel. No long car rides / flights/ surgeries.    In the ED she was seen by Dr Shepard with whom I have discussed the case. Patient initially had temp of 100.0F, tachycardic to 130s, hypertensive up to 160s/80s, 83% on RA initially.   CBC and BMP unremarkable.  Lactic acid 1.6, procalcitonin 0.05.  Troponin I 0.054.  Influenza panel negative.  Chest x-ray not showing acute infiltrate.  She has improved with bipap, steroids, and was started on cefepime and vancomycin.  She will be admitted to [facility] for tonight and continued on Bipap.    Review of Systems    The 10 point Review of Systems is negative other than noted in the HPI or here.     Past Medical History    I have reviewed this patient's medical history and updated it with pertinent information if needed.   Past Medical History:   Diagnosis Date     Arthritis     hip     Chronic obstructive lung disease (H)      Chronic rhinitis      COPD (chronic obstructive  pulmonary disease) (H)      Macular degeneration     wet     Nocturnal hypoxia      Osteopenia      Oxygen dependent     4L/nc at night and as needed during the day     Pulmonary nodules      Radius fracture      Sleep apnea     does not use cpap       Past Surgical History   I have reviewed this patient's surgical history and updated it with pertinent information if needed.  Past Surgical History:   Procedure Laterality Date     ARTHROPLASTY HIP ANTERIOR Right 12/16/2015    Procedure: ARTHROPLASTY HIP ANTERIOR;  Surgeon: Alf Rivera MD;  Location: [facility] OR     ARTHROPLASTY HIP ANTERIOR Left 1/26/2016    Procedure: ARTHROPLASTY HIP ANTERIOR;  Surgeon: Alf Rivera MD;  Location: [facility] OR     COLONOSCOPY       EYE SURGERY      bilat cataracts     Pr punc/aspir breast cyst benign       TONSILLECTOMY      AGE 11       Social History   I have reviewed this patient's social history and updated it with pertinent information if needed.  Social History     Tobacco Use     Smoking status: Never Smoker   Substance Use Topics     Alcohol use: Yes     Comment: 3 DRINKS A WEEK     Drug use: No       Family History   I have reviewed this patient's family history and updated it with pertinent information if needed.   No early CAD reported.     Prior to Admission Medications   Prior to Admission Medications   Prescriptions Last Dose Informant Patient Reported? Taking?   albuterol (PROAIR HFA/PROVENTIL HFA/VENTOLIN HFA) 108 (90 Base) MCG/ACT inhaler   No No   Sig: Inhale 2 puffs into the lungs every 4 hours as needed   fluticasone-salmeterol (ADVAIR) 250-50 MCG/DOSE inhaler   No No   Sig: Inhale 1 puff into the lungs every 12 hours   guaiFENesin-codeine (ROBITUSSIN AC) 100-10 MG/5ML SOLN  Self Yes No   Sig: Take 1 tsp. by mouth daily as needed    ipratropium - albuterol 0.5 mg/2.5 mg/3 mL (DUONEB) 0.5-2.5 (3) MG/3ML neb solution   No No   Sig: Take 1 vial (3 mLs) by nebulization daily   predniSONE (DELTASONE) 10 MG tablet   No  No   Si tabs daily for 2 days, then 3 tabs daily for 3 days, then 2 tabs daily for 3 days, then 1 tab daily for 3 then one half tablet for 3 days, then stop.   triamterene-HCTZ (DYAZIDE) 37.5-25 MG capsule  Pharmacy Yes No   Sig: Take 1 capsule by mouth daily      Facility-Administered Medications: None     Allergies   No Known Allergies    Physical Exam   Vital Signs: Temp: 100  F (37.8  C)   BP: (!) 161/79 Pulse: 136 Heart Rate: 134 Resp: 25 SpO2: 100 % O2 Device: None (Room air)    Weight: 175 lbs 0 oz    Gen: NAD, pleasant, Bipap mask in place  HEENT: Normocephalic, EOMI, MMM  Resp: no focal crackles,  scattered wheezes, diminished in general, difficulty with full sentences  CV: S1S2 heard, reg rhythm, tachy (110s) rate, no pitting pedal edema  Abdo: soft, nontender, nondistended, bowel sounds present  Ext: calves nontender, well perfused  Neuro: AAOx3, CN grossly intact, no facial asymmetry      Data   Data reviewed today: I reviewed all medications, new labs and imaging results over the last 24 hours. I personally reviewed no images or EKG's today.    Recent Labs   Lab 19  2349   WBC 8.5   HGB 13.3   MCV 91         POTASSIUM 3.4   CHLORIDE 99   CO2 32   BUN 17   CR 0.83   ANIONGAP 5   TANISHA 8.9   *   TROPI 0.054*     Recent Results (from the past 24 hour(s))   XR Chest Port 1 View    Narrative    EXAM: XR CHEST PORT 1 VW  LOCATION: [facility]  DATE/TIME: 2019 11:51 PM    INDICATION: Cough  COMPARISON: 2019      Impression    IMPRESSION: No evidence for acute focal alveolar infiltrate or consolidation. Normal heart size. Normal pulmonary vascularity. Stable opacity involving the left lung base representing a prominent nipple shadow. No evidence for pneumothorax. Minimal   degenerative changes in the left acromioclavicular joint and spine.

## 2019-12-19 ENCOUNTER — APPOINTMENT (OUTPATIENT)
Dept: CARDIOLOGY | Facility: CLINIC | Age: 79
DRG: 189 | End: 2019-12-19
Attending: INTERNAL MEDICINE
Payer: MEDICARE

## 2019-12-19 LAB
ALBUMIN SERPL-MCNC: 3.2 G/DL (ref 3.4–5)
ALP SERPL-CCNC: 53 U/L (ref 40–150)
ALT SERPL W P-5'-P-CCNC: 23 U/L (ref 0–50)
ANION GAP SERPL CALCULATED.3IONS-SCNC: 1 MMOL/L (ref 3–14)
AST SERPL W P-5'-P-CCNC: 34 U/L (ref 0–45)
BILIRUB SERPL-MCNC: 0.2 MG/DL (ref 0.2–1.3)
BUN SERPL-MCNC: 21 MG/DL (ref 7–30)
CALCIUM SERPL-MCNC: 8.7 MG/DL (ref 8.5–10.1)
CHLORIDE SERPL-SCNC: 104 MMOL/L (ref 94–109)
CHOLEST SERPL-MCNC: 181 MG/DL
CO2 SERPL-SCNC: 33 MMOL/L (ref 20–32)
CREAT SERPL-MCNC: 0.64 MG/DL (ref 0.52–1.04)
ERYTHROCYTE [DISTWIDTH] IN BLOOD BY AUTOMATED COUNT: 15.3 % (ref 10–15)
GFR SERPL CREATININE-BSD FRML MDRD: 85 ML/MIN/{1.73_M2}
GLUCOSE SERPL-MCNC: 138 MG/DL (ref 70–99)
HCT VFR BLD AUTO: 39.4 % (ref 35–47)
HDLC SERPL-MCNC: 98 MG/DL
HGB BLD-MCNC: 12.5 G/DL (ref 11.7–15.7)
INR PPP: 1 (ref 0.86–1.14)
LACTATE BLD-SCNC: 1.5 MMOL/L (ref 0.7–2)
LDLC SERPL CALC-MCNC: 73 MG/DL
MCH RBC QN AUTO: 29.1 PG (ref 26.5–33)
MCHC RBC AUTO-ENTMCNC: 31.7 G/DL (ref 31.5–36.5)
MCV RBC AUTO: 92 FL (ref 78–100)
NONHDLC SERPL-MCNC: 83 MG/DL
PLATELET # BLD AUTO: 189 10E9/L (ref 150–450)
POTASSIUM SERPL-SCNC: 3.8 MMOL/L (ref 3.4–5.3)
PROT SERPL-MCNC: 7.2 G/DL (ref 6.8–8.8)
RBC # BLD AUTO: 4.3 10E12/L (ref 3.8–5.2)
SODIUM SERPL-SCNC: 138 MMOL/L (ref 133–144)
TRIGL SERPL-MCNC: 50 MG/DL
WBC # BLD AUTO: 6.4 10E9/L (ref 4–11)

## 2019-12-19 PROCEDURE — 83605 ASSAY OF LACTIC ACID: CPT | Performed by: HOSPITALIST

## 2019-12-19 PROCEDURE — 80053 COMPREHEN METABOLIC PANEL: CPT | Performed by: INTERNAL MEDICINE

## 2019-12-19 PROCEDURE — 99232 SBSQ HOSP IP/OBS MODERATE 35: CPT | Mod: 25 | Performed by: INTERNAL MEDICINE

## 2019-12-19 PROCEDURE — 25000132 ZZH RX MED GY IP 250 OP 250 PS 637: Mod: GY | Performed by: INTERNAL MEDICINE

## 2019-12-19 PROCEDURE — 12000000 ZZH R&B MED SURG/OB

## 2019-12-19 PROCEDURE — 80061 LIPID PANEL: CPT | Performed by: INTERNAL MEDICINE

## 2019-12-19 PROCEDURE — 25000132 ZZH RX MED GY IP 250 OP 250 PS 637: Mod: GY | Performed by: HOSPITALIST

## 2019-12-19 PROCEDURE — 85027 COMPLETE CBC AUTOMATED: CPT | Performed by: INTERNAL MEDICINE

## 2019-12-19 PROCEDURE — 93306 TTE W/DOPPLER COMPLETE: CPT

## 2019-12-19 PROCEDURE — 25000125 ZZHC RX 250: Performed by: INTERNAL MEDICINE

## 2019-12-19 PROCEDURE — 12000047 ZZH R&B IMCU

## 2019-12-19 PROCEDURE — 40000275 ZZH STATISTIC RCP TIME EA 10 MIN

## 2019-12-19 PROCEDURE — 94640 AIRWAY INHALATION TREATMENT: CPT

## 2019-12-19 PROCEDURE — 36415 COLL VENOUS BLD VENIPUNCTURE: CPT | Performed by: HOSPITALIST

## 2019-12-19 PROCEDURE — 94660 CPAP INITIATION&MGMT: CPT

## 2019-12-19 PROCEDURE — 94640 AIRWAY INHALATION TREATMENT: CPT | Mod: 76

## 2019-12-19 PROCEDURE — 99232 SBSQ HOSP IP/OBS MODERATE 35: CPT | Performed by: HOSPITALIST

## 2019-12-19 PROCEDURE — 93306 TTE W/DOPPLER COMPLETE: CPT | Mod: 26 | Performed by: INTERNAL MEDICINE

## 2019-12-19 PROCEDURE — 36415 COLL VENOUS BLD VENIPUNCTURE: CPT | Performed by: INTERNAL MEDICINE

## 2019-12-19 PROCEDURE — 85610 PROTHROMBIN TIME: CPT | Performed by: INTERNAL MEDICINE

## 2019-12-19 PROCEDURE — 25000128 H RX IP 250 OP 636: Performed by: HOSPITALIST

## 2019-12-19 RX ORDER — TRIAMTERENE/HYDROCHLOROTHIAZID 37.5-25 MG
1 TABLET ORAL DAILY
Status: DISCONTINUED | OUTPATIENT
Start: 2019-12-19 | End: 2019-12-22

## 2019-12-19 RX ORDER — DILTIAZEM HYDROCHLORIDE 30 MG/1
30 TABLET, FILM COATED ORAL EVERY 6 HOURS SCHEDULED
Status: DISCONTINUED | OUTPATIENT
Start: 2019-12-20 | End: 2019-12-20

## 2019-12-19 RX ORDER — TRIAMTERENE AND HYDROCHLOROTHIAZIDE 37.5; 25 MG/1; MG/1
1 CAPSULE ORAL DAILY
Status: DISCONTINUED | OUTPATIENT
Start: 2019-12-19 | End: 2019-12-19

## 2019-12-19 RX ADMIN — METHYLPREDNISOLONE SODIUM SUCCINATE 62.5 MG: 125 INJECTION, POWDER, FOR SOLUTION INTRAMUSCULAR; INTRAVENOUS at 12:32

## 2019-12-19 RX ADMIN — TRIAMTERENE AND HYDROCHLOROTHIAZIDE 1 TABLET: 37.5; 25 TABLET ORAL at 14:11

## 2019-12-19 RX ADMIN — IPRATROPIUM BROMIDE AND ALBUTEROL SULFATE 3 ML: .5; 3 SOLUTION RESPIRATORY (INHALATION) at 16:51

## 2019-12-19 RX ADMIN — IPRATROPIUM BROMIDE AND ALBUTEROL SULFATE 3 ML: .5; 3 SOLUTION RESPIRATORY (INHALATION) at 08:20

## 2019-12-19 RX ADMIN — CEFEPIME HYDROCHLORIDE 2 G: 2 INJECTION, POWDER, FOR SOLUTION INTRAVENOUS at 23:27

## 2019-12-19 RX ADMIN — IPRATROPIUM BROMIDE AND ALBUTEROL SULFATE 3 ML: .5; 3 SOLUTION RESPIRATORY (INHALATION) at 12:35

## 2019-12-19 RX ADMIN — METHYLPREDNISOLONE SODIUM SUCCINATE 62.5 MG: 125 INJECTION, POWDER, FOR SOLUTION INTRAMUSCULAR; INTRAVENOUS at 23:27

## 2019-12-19 RX ADMIN — METHYLPREDNISOLONE SODIUM SUCCINATE 62.5 MG: 125 INJECTION, POWDER, FOR SOLUTION INTRAMUSCULAR; INTRAVENOUS at 07:01

## 2019-12-19 RX ADMIN — IPRATROPIUM BROMIDE AND ALBUTEROL SULFATE 3 ML: .5; 3 SOLUTION RESPIRATORY (INHALATION) at 19:28

## 2019-12-19 RX ADMIN — ASPIRIN 81 MG: 81 TABLET, COATED ORAL at 10:04

## 2019-12-19 RX ADMIN — METHYLPREDNISOLONE SODIUM SUCCINATE 62.5 MG: 125 INJECTION, POWDER, FOR SOLUTION INTRAMUSCULAR; INTRAVENOUS at 18:17

## 2019-12-19 RX ADMIN — HYDRALAZINE HYDROCHLORIDE 10 MG: 20 INJECTION INTRAMUSCULAR; INTRAVENOUS at 20:42

## 2019-12-19 RX ADMIN — CEFEPIME HYDROCHLORIDE 2 G: 2 INJECTION, POWDER, FOR SOLUTION INTRAVENOUS at 12:32

## 2019-12-19 ASSESSMENT — ACTIVITIES OF DAILY LIVING (ADL)
ADLS_ACUITY_SCORE: 17

## 2019-12-19 ASSESSMENT — MIFFLIN-ST. JEOR: SCORE: 1397.63

## 2019-12-19 NOTE — PROGRESS NOTES
"[facility]  Hospitalist Progress Note        Titus Rosas MD   12/19/2019        Interval History:      -shortness of breath about the same, \"I feel terrible\" , wheezing slightly better ; intermittently using Bipap, weaned to 3 lts this am; denies any chest pain         Assessment and Plan:        Nena Sanders is a 79 year old female with history of COPD, O2 dependent at night (takes 4 lts), pulmonary nodules, sleep apnea, HTN, and macular degeneration who presented to the ED with SOB,  admitted for COPD exacerbation.  - PTA was recently started on levofloxacin and prednisone burst/taper per Nick THOMAS on 12/14     # COPD exacerbation  # Acute on chronic hypoxic respiratory failure  # History of pulmonary nodules  # History of sleep apnea (mild) and nocturnal O2 dependent (4lts by NC)  - admitted to [facility], requiring intermittent BiPAP; on 3 lts this am; wean oxygen/Bipap as able  - Blood cultures 12/17 with no growth so far, influenza negative   - CXR on admission no significant infiltrates  - currently on empiric Cefepime; continue duo-nebs four times daily, Solu-Medrol Q6 hours  - guaifenesin prn; incentive spirometry      # Elevated troponin, likely demand ischemia  # No CAD history  - trops peaked to 3.725-- ECHO pending  - evaluated by cardiology; suggest likely demand ischemia in the setting of hypoxia related to COPD exacerbation; patient denies any chest pain; cardiology discussed stress test versus cardiac cath and patient not inclined to any at this time; continue medical management; await ECHO; might need to reconsider definitive eval if abnormal ECHO  - started on ASA 81 mg daily; Heparin drip d/eugene as ACS less likely     # HTN  - resume PTA Dyazide, hydralazine iv prn for SBP>180     # DVT Prophylaxis: Enoxaparin (Lovenox) SQ    # Code Status: Full Code      Disposition Plan     Expected discharge: likely 2-3 days pending clinical improvement; can probably transfer out of [facility] " "if remains stable off Bipap                   Physical Exam:      Blood pressure (!) 160/77, pulse 90, temperature 97.6  F (36.4  C), temperature source Oral, resp. rate 17, height 1.702 m (5' 7\"), weight 88.5 kg (195 lb 1.7 oz), SpO2 97 %.  Vitals:    12/17/19 2320 12/18/19 0600 12/19/19 0600   Weight: 79.4 kg (175 lb) 79.5 kg (175 lb 4.3 oz) 88.5 kg (195 lb 1.7 oz)     Vital Signs with Ranges  Temp:  [97.6  F (36.4  C)-98  F (36.7  C)] 97.6  F (36.4  C)  Pulse:  [] 90  Heart Rate:  [] 88  Resp:  [12-27] 17  BP: (115-173)/(57-81) 160/77  FiO2 (%):  [40 %] 40 %  SpO2:  [85 %-100 %] 97 %  I/O's Last 24 hours  I/O last 3 completed shifts:  In: 810 [P.O.:810]  Out: -     Constitutional: Alert, awake and oriented X 3; not in apparent distress but sick looking appearance; speaking in full sentences   HEENT: Pupils equal and reactive to light and accomodation, EOMI intact; neck supple no raised JVD or rigidity    Oral cavity: Moist mucosa   Cardiovascular: Normal s1 s2, regular rate and rhythm, no murmur   Lungs: B/l diffuse expiratory wheezes +   Abdomen: Soft, nt, nd, no guarding, rigidity or rebound; BS +   LE : No edema   Musculoskeletal: Power 5/5 in all extremities   Neuro: No focal neurological deficits noted, CN II to XII grossly intact   Psychiatry: normal mood and affect                Medications:          aspirin  81 mg Oral Daily     ceFEPIme (MAXIPIME) IV  2 g Intravenous Q12H     ipratropium - albuterol 0.5 mg/2.5 mg/3 mL  3 mL Nebulization 4x daily     methylPREDNISolone  62.5 mg Intravenous Q6H     senna-docusate  1 tablet Oral BID    Or     senna-docusate  2 tablet Oral BID     sodium chloride (PF)  3 mL Intracatheter Q8H     PRN Meds: acetaminophen, acetaminophen, albuterol, guaiFENesin-codeine, hydrALAZINE, lidocaine 4%, lidocaine (buffered or not buffered), magnesium sulfate, melatonin, naloxone, nitroGLYcerin, ondansetron **OR** ondansetron, potassium chloride, potassium chloride with " lidocaine, potassium chloride, potassium chloride, potassium chloride, sodium chloride (PF)         Data:      All new lab and imaging data was reviewed.   Recent Labs   Lab Test 12/19/19  0626 12/17/19  2349 01/26/19  1045   WBC 6.4 8.5 5.3   HGB 12.5 13.3 13.7   MCV 92 91 89    204 214   INR 1.00  --   --       Recent Labs   Lab Test 12/19/19  0626 12/18/19  0618 12/17/19  2349 01/26/19  1045     --  136 138   POTASSIUM 3.8 3.6 3.4 3.5   CHLORIDE 104  --  99 101   CO2 33*  --  32 29   BUN 21  --  17 15   CR 0.64  --  0.83 0.84   ANIONGAP 1*  --  5 8   TANISHA 8.7  --  8.9 9.1   *  --  161* 108*     Recent Labs   Lab Test 12/18/19  1853 12/18/19  1541 12/18/19  1017   TROPI 3.725* 1.716* 1.734*

## 2019-12-19 NOTE — PLAN OF CARE
Alert and oriented x4. Vital signs stable on 3L nasal cannula. Intermittently uses BiPAP. Standby assist. Tolerating regular diet. Lung sounds with expiratory wheezes. Frequent strong, productive, congested cough. Bowel sounds active, + flatus, last BM 12/16 - scheduled senna given. Adequate urine output. Skin WDL. Denies pain and nausea. Tele sinus rhythm with PAC.

## 2019-12-19 NOTE — PROVIDER NOTIFICATION
Paged hospitalist as patient has been running 160s systolic, normally takes triamterene-hctz PTA     -received orders for triamterene-hydrochlorothiazide

## 2019-12-19 NOTE — PLAN OF CARE
Patient is alert and orientated X 4, up with assistance of one, vital signs stable except BP elevated. Utilizing 2 L of oxygen via nasal cannula, saturation 90%. Patient on Bipap overnight FiO2 40%, saturation in the 90's. Tele: SR with PAC.

## 2019-12-19 NOTE — PROGRESS NOTES
"Cardiology Progress Note  Sanford Polo MD         Assessment and Plan:      COPD exacerbation with acute respiratory failure  Still having cough with mucopurulent expectoration and wheezing.  Breathing is improved but not to baseline.  On inhalers and steroids.  On antibiotics    Elevated troponin  Peak troponin of 3.7.  Likely from respiratory failure and hypoxia although I cannot exclude non-ST elevation microinfarction.  Patient has no chest pain.  We talked about possibility of doing coronary geography for definitive diagnosis but she appears not in a condition to do that given her respiratory status and also is reluctant.  Echo is also pending.  Will make further decision after her echocardiogram results are available.  On aspirin.  LDL in the 70s but HDL 98.  Therefore will hold off on statin.                   Interval History:   denies chest pain and SOB+          Review of Systems:   The 5 point Review of Systems is negative other than noted in the HPI          Physical Exam:        Blood pressure (!) 155/82, pulse 86, temperature 97.6  F (36.4  C), temperature source Oral, resp. rate 23, height 1.702 m (5' 7\"), weight 88.5 kg (195 lb 1.7 oz), SpO2 95 %.  Vitals:    12/17/19 2320 12/18/19 0600 12/19/19 0600   Weight: 79.4 kg (175 lb) 79.5 kg (175 lb 4.3 oz) 88.5 kg (195 lb 1.7 oz)     Weights since admission  Vitals:    12/17/19 2320 12/18/19 0600 12/19/19 0600   Weight: 79.4 kg (175 lb) 79.5 kg (175 lb 4.3 oz) 88.5 kg (195 lb 1.7 oz)     Vital Signs with Ranges  Temp:  [97.6  F (36.4  C)-98  F (36.7  C)] 97.6  F (36.4  C)  Pulse:  [] 86  Heart Rate:  [] 104  Resp:  [12-27] 23  BP: (115-173)/(57-82) 155/82  FiO2 (%):  [40 %] 40 %  SpO2:  [85 %-100 %] 95 %  I/O's Last 24 hours  I/O last 3 completed shifts:  In: 810 [P.O.:810]  Out: -   Net I/O since admission  12/14 0700 - 12/19 0659  In: 1810 [P.O.:810]  Out: -   Net: 1810    EXAM:    Constitutional:   In mild distress     Neck:   no JVD "     Lungs:   rhonchii bilateral     Cardiovascular:   normal S1 and S2 and no murmur noted     Extremities and Back:   No edema     Neurological:   No gross or focal neurologic abnormalities            Medications:          aspirin  81 mg Oral Daily     ceFEPIme (MAXIPIME) IV  2 g Intravenous Q12H     ipratropium - albuterol 0.5 mg/2.5 mg/3 mL  3 mL Nebulization 4x daily     methylPREDNISolone  62.5 mg Intravenous Q6H     senna-docusate  1 tablet Oral BID    Or     senna-docusate  2 tablet Oral BID     sodium chloride (PF)  3 mL Intracatheter Q8H     triamterene-HCTZ  1 tablet Oral Daily            Data:      All new lab and imaging data was reviewed.   Recent Labs   Lab Test 12/19/19  0626 12/17/19  2349 01/26/19  1045   WBC 6.4 8.5 5.3   HGB 12.5 13.3 13.7   MCV 92 91 89    204 214   INR 1.00  --   --       Recent Labs   Lab Test 12/19/19  0626 12/18/19  0618 12/17/19  2349 01/26/19  1045     --  136 138   POTASSIUM 3.8 3.6 3.4 3.5   CHLORIDE 104  --  99 101   CO2 33*  --  32 29   BUN 21  --  17 15   CR 0.64  --  0.83 0.84   ANIONGAP 1*  --  5 8   TANISHA 8.7  --  8.9 9.1   *  --  161* 108*     Recent Labs   Lab Test 12/18/19  1853 12/18/19  1541 12/18/19  1017   TROPI 3.725* 1.716* 1.734*              Imaging:   No results found for this or any previous visit (from the past 24 hour(s)).

## 2019-12-20 ENCOUNTER — APPOINTMENT (OUTPATIENT)
Dept: CT IMAGING | Facility: CLINIC | Age: 79
DRG: 189 | End: 2019-12-20
Attending: HOSPITALIST
Payer: MEDICARE

## 2019-12-20 LAB
CREAT SERPL-MCNC: 0.59 MG/DL (ref 0.52–1.04)
D DIMER PPP FEU-MCNC: 0.8 UG/ML FEU (ref 0–0.5)
GFR SERPL CREATININE-BSD FRML MDRD: 88 ML/MIN/{1.73_M2}
INTERPRETATION ECG - MUSE: NORMAL
LACTATE BLD-SCNC: 1 MMOL/L (ref 0.7–2)
PLATELET # BLD AUTO: 220 10E9/L (ref 150–450)

## 2019-12-20 PROCEDURE — 36415 COLL VENOUS BLD VENIPUNCTURE: CPT | Performed by: HOSPITALIST

## 2019-12-20 PROCEDURE — 25000128 H RX IP 250 OP 636: Performed by: HOSPITALIST

## 2019-12-20 PROCEDURE — 25000125 ZZHC RX 250: Performed by: HOSPITALIST

## 2019-12-20 PROCEDURE — 94640 AIRWAY INHALATION TREATMENT: CPT

## 2019-12-20 PROCEDURE — 71275 CT ANGIOGRAPHY CHEST: CPT

## 2019-12-20 PROCEDURE — 82565 ASSAY OF CREATININE: CPT | Performed by: HOSPITALIST

## 2019-12-20 PROCEDURE — 40000275 ZZH STATISTIC RCP TIME EA 10 MIN

## 2019-12-20 PROCEDURE — 12000047 ZZH R&B IMCU

## 2019-12-20 PROCEDURE — 94660 CPAP INITIATION&MGMT: CPT

## 2019-12-20 PROCEDURE — 85379 FIBRIN DEGRADATION QUANT: CPT | Performed by: HOSPITALIST

## 2019-12-20 PROCEDURE — 25000132 ZZH RX MED GY IP 250 OP 250 PS 637: Mod: GY | Performed by: HOSPITALIST

## 2019-12-20 PROCEDURE — 12000000 ZZH R&B MED SURG/OB

## 2019-12-20 PROCEDURE — 83605 ASSAY OF LACTIC ACID: CPT | Performed by: HOSPITALIST

## 2019-12-20 PROCEDURE — 94640 AIRWAY INHALATION TREATMENT: CPT | Mod: 76

## 2019-12-20 PROCEDURE — 99233 SBSQ HOSP IP/OBS HIGH 50: CPT | Performed by: HOSPITALIST

## 2019-12-20 PROCEDURE — 99232 SBSQ HOSP IP/OBS MODERATE 35: CPT | Performed by: INTERNAL MEDICINE

## 2019-12-20 PROCEDURE — 25000132 ZZH RX MED GY IP 250 OP 250 PS 637: Mod: GY | Performed by: INTERNAL MEDICINE

## 2019-12-20 PROCEDURE — 85049 AUTOMATED PLATELET COUNT: CPT | Performed by: HOSPITALIST

## 2019-12-20 RX ORDER — AMLODIPINE BESYLATE 5 MG/1
5 TABLET ORAL DAILY
Status: DISCONTINUED | OUTPATIENT
Start: 2019-12-20 | End: 2019-12-26

## 2019-12-20 RX ORDER — IOPAMIDOL 755 MG/ML
71 INJECTION, SOLUTION INTRAVASCULAR ONCE
Status: COMPLETED | OUTPATIENT
Start: 2019-12-20 | End: 2019-12-20

## 2019-12-20 RX ORDER — LEVALBUTEROL 1.25 MG/.5ML
1.25 SOLUTION, CONCENTRATE RESPIRATORY (INHALATION) EVERY 6 HOURS
Status: DISCONTINUED | OUTPATIENT
Start: 2019-12-20 | End: 2019-12-27 | Stop reason: HOSPADM

## 2019-12-20 RX ORDER — LORAZEPAM 0.5 MG/1
.25-.5 TABLET ORAL EVERY 4 HOURS PRN
Status: DISCONTINUED | OUTPATIENT
Start: 2019-12-20 | End: 2019-12-27 | Stop reason: HOSPADM

## 2019-12-20 RX ORDER — LEVALBUTEROL 1.25 MG/.5ML
1.25 SOLUTION, CONCENTRATE RESPIRATORY (INHALATION) EVERY 4 HOURS PRN
Status: DISCONTINUED | OUTPATIENT
Start: 2019-12-20 | End: 2019-12-20

## 2019-12-20 RX ADMIN — LORAZEPAM 0.25 MG: 0.5 TABLET ORAL at 19:59

## 2019-12-20 RX ADMIN — LEVALBUTEROL HYDROCHLORIDE 1.25 MG: 1.25 SOLUTION, CONCENTRATE RESPIRATORY (INHALATION) at 19:59

## 2019-12-20 RX ADMIN — HYDRALAZINE HYDROCHLORIDE 10 MG: 20 INJECTION INTRAMUSCULAR; INTRAVENOUS at 05:21

## 2019-12-20 RX ADMIN — LORAZEPAM 0.25 MG: 0.5 TABLET ORAL at 12:55

## 2019-12-20 RX ADMIN — METHYLPREDNISOLONE SODIUM SUCCINATE 62.5 MG: 125 INJECTION, POWDER, FOR SOLUTION INTRAMUSCULAR; INTRAVENOUS at 23:40

## 2019-12-20 RX ADMIN — AMLODIPINE BESYLATE 5 MG: 5 TABLET ORAL at 12:55

## 2019-12-20 RX ADMIN — ENOXAPARIN SODIUM 40 MG: 40 INJECTION SUBCUTANEOUS at 16:08

## 2019-12-20 RX ADMIN — ASPIRIN 81 MG: 81 TABLET, COATED ORAL at 10:51

## 2019-12-20 RX ADMIN — SODIUM CHLORIDE 95 ML: 9 INJECTION, SOLUTION INTRAVENOUS at 15:31

## 2019-12-20 RX ADMIN — GUAIFENESIN AND CODEINE PHOSPHATE 5 ML: 100; 10 SOLUTION ORAL at 23:39

## 2019-12-20 RX ADMIN — TRIAMTERENE AND HYDROCHLOROTHIAZIDE 1 TABLET: 37.5; 25 TABLET ORAL at 10:51

## 2019-12-20 RX ADMIN — IOPAMIDOL 71 ML: 755 INJECTION, SOLUTION INTRAVENOUS at 15:30

## 2019-12-20 RX ADMIN — CEFEPIME HYDROCHLORIDE 2 G: 2 INJECTION, POWDER, FOR SOLUTION INTRAVENOUS at 12:54

## 2019-12-20 RX ADMIN — CEFEPIME HYDROCHLORIDE 2 G: 2 INJECTION, POWDER, FOR SOLUTION INTRAVENOUS at 23:40

## 2019-12-20 RX ADMIN — METHYLPREDNISOLONE SODIUM SUCCINATE 62.5 MG: 125 INJECTION, POWDER, FOR SOLUTION INTRAMUSCULAR; INTRAVENOUS at 05:18

## 2019-12-20 RX ADMIN — METHYLPREDNISOLONE SODIUM SUCCINATE 62.5 MG: 125 INJECTION, POWDER, FOR SOLUTION INTRAMUSCULAR; INTRAVENOUS at 18:59

## 2019-12-20 RX ADMIN — DILTIAZEM HYDROCHLORIDE 30 MG: 30 TABLET, FILM COATED ORAL at 10:51

## 2019-12-20 RX ADMIN — METHYLPREDNISOLONE SODIUM SUCCINATE 62.5 MG: 125 INJECTION, POWDER, FOR SOLUTION INTRAMUSCULAR; INTRAVENOUS at 12:54

## 2019-12-20 RX ADMIN — LEVALBUTEROL HYDROCHLORIDE 1.25 MG: 1.25 SOLUTION, CONCENTRATE RESPIRATORY (INHALATION) at 13:58

## 2019-12-20 ASSESSMENT — ACTIVITIES OF DAILY LIVING (ADL)
ADLS_ACUITY_SCORE: 20
ADLS_ACUITY_SCORE: 17
ADLS_ACUITY_SCORE: 19
ADLS_ACUITY_SCORE: 17
ADLS_ACUITY_SCORE: 20
ADLS_ACUITY_SCORE: 17

## 2019-12-20 ASSESSMENT — MIFFLIN-ST. JEOR: SCORE: 1394.63

## 2019-12-20 NOTE — PROGRESS NOTES
[facility]  Hospitalist Progress Note        Titus Rosas MD   12/20/2019        Interval History:      - BP elevated overnight along with tachycardia, started on Diltiazem 30 q 6hrs; intermittently using Bipap; reports no significant improvement in shortness of breath and feeling very anxious ; no chest pain         Assessment and Plan:        Nena Sanders is a 79 year old female with history of COPD, O2 dependent at night (takes 4 lts), pulmonary nodules, sleep apnea, HTN, and macular degeneration who presented to the ED with SOB,  admitted for COPD exacerbation.  - PTA was recently started on levofloxacin and prednisone burst/taper per Pulioana THOMAS on 12/14     # COPD exacerbation  # Acute on chronic hypoxic respiratory failure  # History of pulmonary nodules  # History of sleep apnea (mild) and nocturnal O2 dependent (4lts by NC)  - Blood cultures 12/17 with no growth so far, influenza negative   - CXR on admission no significant infiltrates  - reports feeling very anxious despite oxygenating well off Bipap-- will have ativan prn  - intermittent Bipap, currently on 4 lts NC  - demies any chest pain but continued SOB; d -dimer elevated ; will get a CT Chest to rule out PE  - currently on empiric Cefepime; will change duonebs to Xopenex nebs given tachycardia; continue IV Solu-Medrol Q6 hours; since plan for coronary angiogram (see below), will consult pulmonology for better optimization of respiratory status  - guaifenesin prn; incentive spirometry     # Elevated troponin, likely demand ischemia  # No CAD history  - trops peaked to 3.725  - evaluated by cardiology; suggest likely demand ischemia in the setting of hypoxia related to COPD exacerbation but can't rule out CAD; patient denies any chest pain  - started on ASA 81 mg daily; Heparin drip d/eugene as ACS less likely  - ECHO 12/19 noted as technically difficult study but with EF 60-65% and no significant wall motion abnormalities  -  "Dr Polo plans for cardiac cath on Monday     # HTN  - continue PTA Dyazide  - BP elevated overnight along with tachycardia, started on Diltiazem 30 q 6hrs but patient refused; high BP likely sec to anxiety  - will discontinue IV fluids  - will discontinue Diltiazem and add amlodpine 5 mg po daily; hydralazine iv prn for SBP>180     # DVT Prophylaxis: Enoxaparin (Lovenox) SQ    # Code Status: Full Code     Care plan discussed with patient, her daughter in the room and also with cardiologist Dr. Polo     Disposition Plan     Expected discharge: likely 3-5 days pending clinical improvement, angiogram, clearance by cardiology/pulmonology         Physical Exam:      Blood pressure (!) 177/89, pulse 110, temperature 97.5  F (36.4  C), temperature source Oral, resp. rate 24, height 1.702 m (5' 7\"), weight 88.2 kg (194 lb 7.1 oz), SpO2 98 %.  Vitals:    12/18/19 0600 12/19/19 0600 12/20/19 0600   Weight: 79.5 kg (175 lb 4.3 oz) 88.5 kg (195 lb 1.7 oz) 88.2 kg (194 lb 7.1 oz)     Vital Signs with Ranges  Temp:  [94.6  F (34.8  C)-98.2  F (36.8  C)] 97.5  F (36.4  C)  Pulse:  [] 110  Heart Rate:  [] 106  Resp:  [12-27] 24  BP: (105-190)/() 177/89  FiO2 (%):  [40 %] 40 %  SpO2:  [95 %-100 %] 98 %  I/O's Last 24 hours  I/O last 3 completed shifts:  In: 1246 [P.O.:950; I.V.:296]  Out: 300 [Urine:300]    Constitutional: Alert, awake and oriented X 3; appears very anxious ; speaking in full sentences, mild respiratory distress    HEENT: Pupils equal and reactive to light and accomodation, EOMI intact; neck supple no raised JVD or rigidity    Oral cavity: Moist mucosa   Cardiovascular: Normal s1 s2, regular rate and rhythm, no murmur   Lungs: B/l decreased breath sounds , no significant wheezing    Abdomen: Soft, nt, nd, no guarding, rigidity or rebound; BS +   LE : No edema   Musculoskeletal: Power 5/5 in all extremities   Neuro: No focal neurological deficits noted, CN II to XII grossly intact   Psychiatry: " normal mood and affect                Medications:          aspirin  81 mg Oral Daily     ceFEPIme (MAXIPIME) IV  2 g Intravenous Q12H     diltiazem  30 mg Oral Q6H NIKOLAS     ipratropium - albuterol 0.5 mg/2.5 mg/3 mL  3 mL Nebulization 4x daily     methylPREDNISolone  62.5 mg Intravenous Q6H     senna-docusate  1 tablet Oral BID    Or     senna-docusate  2 tablet Oral BID     sodium chloride (PF)  3 mL Intracatheter Q8H     triamterene-HCTZ  1 tablet Oral Daily     PRN Meds: acetaminophen, acetaminophen, albuterol, guaiFENesin-codeine, hydrALAZINE, lidocaine 4%, lidocaine (buffered or not buffered), LORazepam, magnesium sulfate, melatonin, naloxone, nitroGLYcerin, ondansetron **OR** ondansetron, potassium chloride, potassium chloride with lidocaine, potassium chloride, potassium chloride, potassium chloride, sodium chloride (PF)         Data:      All new lab and imaging data was reviewed.   Recent Labs   Lab Test 12/19/19  0626 12/17/19 2349 01/26/19  1045   WBC 6.4 8.5 5.3   HGB 12.5 13.3 13.7   MCV 92 91 89    204 214   INR 1.00  --   --       Recent Labs   Lab Test 12/19/19  0626 12/18/19  0618 12/17/19  2349 01/26/19  1045     --  136 138   POTASSIUM 3.8 3.6 3.4 3.5   CHLORIDE 104  --  99 101   CO2 33*  --  32 29   BUN 21  --  17 15   CR 0.64  --  0.83 0.84   ANIONGAP 1*  --  5 8   TANISHA 8.7  --  8.9 9.1   *  --  161* 108*     Recent Labs   Lab Test 12/18/19  1853 12/18/19  1541 12/18/19  1017   TROPI 3.725* 1.716* 1.734*

## 2019-12-20 NOTE — PROVIDER NOTIFICATION
Text page to hospitalist Dr. Rosas:    pt expressing feeling very anxious, feeling like she cannot breathe even with O2 sats stable on and off bipap. Requesting to see you.    Also Describes feeling nauseous if she thinks about eating.

## 2019-12-20 NOTE — PROGRESS NOTES
Pt offered diltiazem and explained the reasoning for administration.    Pt refused med; as she was apprehensive of removing BiPAP mask.     Education provided on decreasing HR, BP; but med refused again.    Will monitor.    Refused 0600 dose, as well.

## 2019-12-20 NOTE — PROGRESS NOTES
"Cardiology Progress Note  Sanford Polo MD         Assessment and Plan:      COPD exacerbation with acute respiratory failure  Still having cough with mucopurulent expectoration and some wheezing.  Breathing is improved but not to baseline.  On inhalers and steroids.  On antibiotics  May need pulmonary assistance.      Elevated troponin  Peak troponin of 3.7.    This is likely secondary from hypoxia and respiratory failure but I cannot exclude severe CAD.  Echo reveals normal ejection fraction.  Discussed with her and daughter.  They are leaning towards coronary geography for definitive diagnosis but I think will have to wait till Monday for her respiratory status to optimize.  We will schedule that for Monday.    Discussed with hospitalist.                   Interval History:   denies chest pain and still SOB+          Review of Systems:   The 5 point Review of Systems is negative other than noted in the HPI          Physical Exam:        Blood pressure (!) 177/89, pulse 110, temperature 97.5  F (36.4  C), temperature source Oral, resp. rate 24, height 1.702 m (5' 7\"), weight 88.2 kg (194 lb 7.1 oz), SpO2 98 %.  Vitals:    12/18/19 0600 12/19/19 0600 12/20/19 0600   Weight: 79.5 kg (175 lb 4.3 oz) 88.5 kg (195 lb 1.7 oz) 88.2 kg (194 lb 7.1 oz)     Weights since admission  Vitals:    12/17/19 2320 12/18/19 0600 12/19/19 0600 12/20/19 0600   Weight: 79.4 kg (175 lb) 79.5 kg (175 lb 4.3 oz) 88.5 kg (195 lb 1.7 oz) 88.2 kg (194 lb 7.1 oz)     Vital Signs with Ranges  Temp:  [94.6  F (34.8  C)-98.2  F (36.8  C)] 97.5  F (36.4  C)  Pulse:  [] 110  Heart Rate:  [] 106  Resp:  [12-27] 24  BP: (105-190)/() 177/89  FiO2 (%):  [40 %] 40 %  SpO2:  [95 %-100 %] 98 %  I/O's Last 24 hours  I/O last 3 completed shifts:  In: 1246 [P.O.:950; I.V.:296]  Out: 300 [Urine:300]  Net I/O since admission  12/15 0700 - 12/20 0659  In: 3056 [P.O.:1760; I.V.:296]  Out: 300 [Urine:300]  Net: " 2756    EXAM:    Constitutional:   In mild distress     Neck:   no JVD     Lungs:   rhonchii bilateral     Cardiovascular:   normal S1 and S2 and no murmur noted     Extremities and Back:   No edema     Neurological:   No gross or focal neurologic abnormalities            Medications:          amLODIPine  5 mg Oral Daily     aspirin  81 mg Oral Daily     ceFEPIme (MAXIPIME) IV  2 g Intravenous Q12H     ipratropium - albuterol 0.5 mg/2.5 mg/3 mL  3 mL Nebulization 4x daily     methylPREDNISolone  62.5 mg Intravenous Q6H     senna-docusate  1 tablet Oral BID    Or     senna-docusate  2 tablet Oral BID     sodium chloride (PF)  3 mL Intracatheter Q8H     triamterene-HCTZ  1 tablet Oral Daily            Data:      All new lab and imaging data was reviewed.   Recent Labs   Lab Test 12/19/19  0626 12/17/19 2349 01/26/19  1045   WBC 6.4 8.5 5.3   HGB 12.5 13.3 13.7   MCV 92 91 89    204 214   INR 1.00  --   --       Recent Labs   Lab Test 12/19/19  0626 12/18/19  0618 12/17/19  2349 01/26/19  1045     --  136 138   POTASSIUM 3.8 3.6 3.4 3.5   CHLORIDE 104  --  99 101   CO2 33*  --  32 29   BUN 21  --  17 15   CR 0.64  --  0.83 0.84   ANIONGAP 1*  --  5 8   TANISHA 8.7  --  8.9 9.1   *  --  161* 108*     Recent Labs   Lab Test 12/18/19  1853 12/18/19  1541 12/18/19  1017   TROPI 3.725* 1.716* 1.734*              Imaging:   No results found for this or any previous visit (from the past 24 hour(s)).

## 2019-12-20 NOTE — PLAN OF CARE
"Neuro: Aox4. Highly anxious; multiple concerns addressed to RN in regard to values on monitors. Pt did ask several times if she was \"going to die?\" Pt more at comfort with BiPAP than NC.      Respiratory: 3L NC or BiPAP 40%; O2 tank for ambulation. Bilat diminished LS. Croupy cough infrequently; non productive.     Cardiac: Tachy and elevated BP. PRN metoprolol used x2. Cardizem q6 on order; see Prog Note in reference to refusal. +1 edema BLE.     GI/: Bowel sounds audible x4 quads. BM 12/19. Adequate urine OP.     Skin: Intact. BLE cool to touch, dusky.     Pain: Denies.     Mobility: Sbx1.     Diet: R.    IVF: -    Plan of Care: Monitor resp fxn; decreasing anxiety in relation to BiPAP use. Transition to NC use.      Will continue to monitor.   "

## 2019-12-20 NOTE — PROGRESS NOTES
X cover    Admitted with copd exacerbation    Has ongoing hypertension and tachycardia, will order dilt 30 mg q 6 hrs.      Vickey Castillo MD

## 2019-12-20 NOTE — PLAN OF CARE
Alert and oriented x4. Vital signs stable on 3L nasal cannula ex HTN. Intermittently uses BiPAP. Standby assist. Tolerating regular diet, low appetite today. Lung sounds dimished with expiratory wheezes. Frequent strong, productive, congested cough. Doing IS and aerobika, sched nebs per RT. Bowel sounds active, + flatus, had BM. Adequate urine output. Skin WDL. Denies pain and nausea. Tele sinus rhythm to sinus tach.     Cardiology following, echo done today. ASA given as scheduled.

## 2019-12-20 NOTE — PROGRESS NOTES
CPAP/BiPAP Settings  IPAP: 12  EPAP: 6  Rate: 12  FiO2:   Timed Inspiration (sec): .90    CPAP/BiPAP/AVAPS/AVAPS AE Alarms  High Pressure: 25  Low Pressure: 5  Low Pressure Delay: 20  High Rate (breaths/min): 50  Low Rate (breaths/min): 5  Alarm Volume Level: 10    CPAP/BiPAP/AVAPS/AVAPS AE Patient Assessment  Barriers Applied: gel pad and mepilex in place    Plan:  Will continue to monitor

## 2019-12-20 NOTE — PROVIDER NOTIFICATION
Nena Nelson ([id number]): COPD exacerbation    Additional med for elevated HR? Hydralazine 10mg pushed @ 2045. HR sustaining at 130s x several minutes.    Checo FRANKLIN -[phone]

## 2019-12-21 LAB
CREAT SERPL-MCNC: 0.68 MG/DL (ref 0.52–1.04)
GFR SERPL CREATININE-BSD FRML MDRD: 83 ML/MIN/{1.73_M2}
PLATELET # BLD AUTO: 219 10E9/L (ref 150–450)

## 2019-12-21 PROCEDURE — 99232 SBSQ HOSP IP/OBS MODERATE 35: CPT | Performed by: HOSPITALIST

## 2019-12-21 PROCEDURE — 25000132 ZZH RX MED GY IP 250 OP 250 PS 637: Mod: GY | Performed by: HOSPITALIST

## 2019-12-21 PROCEDURE — 40000275 ZZH STATISTIC RCP TIME EA 10 MIN

## 2019-12-21 PROCEDURE — 25000131 ZZH RX MED GY IP 250 OP 636 PS 637: Mod: GY | Performed by: HOSPITALIST

## 2019-12-21 PROCEDURE — 94640 AIRWAY INHALATION TREATMENT: CPT | Mod: 76

## 2019-12-21 PROCEDURE — 25000125 ZZHC RX 250: Performed by: HOSPITALIST

## 2019-12-21 PROCEDURE — 85049 AUTOMATED PLATELET COUNT: CPT | Performed by: HOSPITALIST

## 2019-12-21 PROCEDURE — 40000809 ZZH STATISTIC NO DOCUMENTATION TO SUPPORT CHARGE

## 2019-12-21 PROCEDURE — 94640 AIRWAY INHALATION TREATMENT: CPT

## 2019-12-21 PROCEDURE — 12000000 ZZH R&B MED SURG/OB

## 2019-12-21 PROCEDURE — 99232 SBSQ HOSP IP/OBS MODERATE 35: CPT | Performed by: INTERNAL MEDICINE

## 2019-12-21 PROCEDURE — 25000128 H RX IP 250 OP 636: Performed by: HOSPITALIST

## 2019-12-21 PROCEDURE — 94660 CPAP INITIATION&MGMT: CPT

## 2019-12-21 PROCEDURE — 12000047 ZZH R&B IMCU

## 2019-12-21 PROCEDURE — 25000125 ZZHC RX 250: Performed by: INTERNAL MEDICINE

## 2019-12-21 PROCEDURE — 36415 COLL VENOUS BLD VENIPUNCTURE: CPT | Performed by: HOSPITALIST

## 2019-12-21 PROCEDURE — 25000132 ZZH RX MED GY IP 250 OP 250 PS 637: Mod: GY | Performed by: INTERNAL MEDICINE

## 2019-12-21 PROCEDURE — 82565 ASSAY OF CREATININE: CPT | Performed by: HOSPITALIST

## 2019-12-21 PROCEDURE — 83880 ASSAY OF NATRIURETIC PEPTIDE: CPT | Performed by: HOSPITALIST

## 2019-12-21 RX ORDER — CEFTRIAXONE 1 G/1
1 INJECTION, POWDER, FOR SOLUTION INTRAMUSCULAR; INTRAVENOUS EVERY 24 HOURS
Status: DISCONTINUED | OUTPATIENT
Start: 2019-12-21 | End: 2019-12-23

## 2019-12-21 RX ORDER — GUAIFENESIN 600 MG/1
1200 TABLET, EXTENDED RELEASE ORAL 2 TIMES DAILY
Status: DISCONTINUED | OUTPATIENT
Start: 2019-12-21 | End: 2019-12-27 | Stop reason: HOSPADM

## 2019-12-21 RX ORDER — AZITHROMYCIN 500 MG/1
500 INJECTION, POWDER, LYOPHILIZED, FOR SOLUTION INTRAVENOUS EVERY 24 HOURS
Status: DISCONTINUED | OUTPATIENT
Start: 2019-12-21 | End: 2019-12-23

## 2019-12-21 RX ORDER — PREDNISONE 20 MG/1
60 TABLET ORAL DAILY
Status: DISCONTINUED | OUTPATIENT
Start: 2019-12-21 | End: 2019-12-26

## 2019-12-21 RX ADMIN — METHYLPREDNISOLONE SODIUM SUCCINATE 62.5 MG: 125 INJECTION, POWDER, FOR SOLUTION INTRAMUSCULAR; INTRAVENOUS at 05:54

## 2019-12-21 RX ADMIN — PREDNISONE 60 MG: 20 TABLET ORAL at 09:24

## 2019-12-21 RX ADMIN — LEVALBUTEROL HYDROCHLORIDE 1.25 MG: 1.25 SOLUTION, CONCENTRATE RESPIRATORY (INHALATION) at 19:37

## 2019-12-21 RX ADMIN — LORAZEPAM 0.25 MG: 0.5 TABLET ORAL at 05:53

## 2019-12-21 RX ADMIN — TRIAMTERENE AND HYDROCHLOROTHIAZIDE 1 TABLET: 37.5; 25 TABLET ORAL at 09:24

## 2019-12-21 RX ADMIN — LORAZEPAM 0.25 MG: 0.5 TABLET ORAL at 23:15

## 2019-12-21 RX ADMIN — AMLODIPINE BESYLATE 5 MG: 5 TABLET ORAL at 09:24

## 2019-12-21 RX ADMIN — LORAZEPAM 0.25 MG: 0.5 TABLET ORAL at 12:38

## 2019-12-21 RX ADMIN — ENOXAPARIN SODIUM 40 MG: 40 INJECTION SUBCUTANEOUS at 13:49

## 2019-12-21 RX ADMIN — SENNOSIDES AND DOCUSATE SODIUM 2 TABLET: 8.6; 5 TABLET ORAL at 09:24

## 2019-12-21 RX ADMIN — GUAIFENESIN 1200 MG: 600 TABLET, EXTENDED RELEASE ORAL at 23:15

## 2019-12-21 RX ADMIN — CEFTRIAXONE SODIUM 1 G: 1 INJECTION, POWDER, FOR SOLUTION INTRAMUSCULAR; INTRAVENOUS at 11:39

## 2019-12-21 RX ADMIN — LEVALBUTEROL HYDROCHLORIDE 1.25 MG: 1.25 SOLUTION, CONCENTRATE RESPIRATORY (INHALATION) at 12:46

## 2019-12-21 RX ADMIN — AZITHROMYCIN MONOHYDRATE 500 MG: 500 INJECTION, POWDER, LYOPHILIZED, FOR SOLUTION INTRAVENOUS at 12:38

## 2019-12-21 RX ADMIN — LEVALBUTEROL HYDROCHLORIDE 1.25 MG: 1.25 SOLUTION, CONCENTRATE RESPIRATORY (INHALATION) at 08:26

## 2019-12-21 RX ADMIN — ASPIRIN 81 MG: 81 TABLET, COATED ORAL at 09:24

## 2019-12-21 ASSESSMENT — ACTIVITIES OF DAILY LIVING (ADL)
ADLS_ACUITY_SCORE: 19
ADLS_ACUITY_SCORE: 20
ADLS_ACUITY_SCORE: 19

## 2019-12-21 ASSESSMENT — MIFFLIN-ST. JEOR: SCORE: 1372.63

## 2019-12-21 NOTE — PROGRESS NOTES
PULMONARY CONSULT NOTE    Full consult dictated.    Active Problems:    COPD exacerbation (H)           Assessment and Plan:      79-year-old female with a history of COPD on nocturnal O2, untreated obstructive sleep apnea, benign pulmonary nodules (CT scan of the chest 9/27/2019 showed no change in the bilateral pulmonary nodules versus 6/21/2012), mild bronchiectasis admitted with a 3-day history of worsening shortness of breath and cough productive of greenish sputum.  Patient is maintained on Advair 250/50 and DuoNebs/Albuterol prn.  She was recently treated with a course of Levaquin and prednisone by her Pulmonologist Dr. Yris Miller at [facility]. Chest x-ray on admission showed no acute infiltrates.  CT scan of the chest 12/20 was negative for PE and demonstrated moderate bronchial wall thickening, bronchiectasis and mucous plugging.  It is my impression the patient has a COPD exacerbation secondary to bronchitis and that she is slowly improving on current therapy.  Patient was changed from DuoNebs to Xopenex due to tachycardia; would recommend adding ipratropium to her Xopenex nebs.  I agree with her antibiotics and steroids as ordered, and would add mucolytics to her regimen.  Respiratory status is currently stable on low-flow oxygen.    Diagnoses  Abnl CT/CXR  R91.8  Bronchiectasis J47.9  Bronchitis acute J20.9  COPD exacerb J44.1  Hypoxemia  R09.02  Sleep apnea obstr G47.33  SOB   R06.02    PLAN:  1. Adjust oxygen, keep SaO2 > 90%  2. Bronchodilators - continue Xopenex nebs, add ipratropium to nebs.  3. Antibiotics - Zithro & Rocephin.  4. Steroids - Prednisone  5. Mucolytics - start Mucinex.  6. Increase activity and IS.  7. Recommend outpatient Pulmonary follow-up with Dr. Miller at [facility].    Updated daughter at bedside.    Will see pt in follow-up on Monday. Please call if needed over the WE. Thanks,      Jenaro Alvarez MD    [facility]  [phone]  (pager)  [phone] (office)

## 2019-12-21 NOTE — PROGRESS NOTES
"Cardiology Progress Note  Sanford Polo MD         Assessment and Plan:      COPD exacerbation with acute respiratory failure  Overall looks better today.  Received Ativan last night which helped.  Used BiPAP last night.  Cough is improved.  Pulmonary consult pending.      Elevated troponin  Peak troponin of 3.7.    This is likely secondary from hypoxia and respiratory failure but I cannot exclude severe CAD.  Echo reveals normal ejection fraction.  Discussed with her and daughter.    Patient and daughter have agreed to proceed with coronary angiography on Monday.                     Interval History:   Shortness of breath improved          Review of Systems:   The 5 point Review of Systems is negative other than noted in the HPI          Physical Exam:        Blood pressure 125/67, pulse 70, temperature 98.2  F (36.8  C), temperature source Oral, resp. rate 18, height 1.702 m (5' 7\"), weight 86 kg (189 lb 9.5 oz), SpO2 97 %.  Vitals:    12/19/19 0600 12/20/19 0600 12/21/19 0600   Weight: 88.5 kg (195 lb 1.7 oz) 88.2 kg (194 lb 7.1 oz) 86 kg (189 lb 9.5 oz)     Weights since admission  Vitals:    12/17/19 2320 12/18/19 0600 12/19/19 0600 12/20/19 0600   Weight: 79.4 kg (175 lb) 79.5 kg (175 lb 4.3 oz) 88.5 kg (195 lb 1.7 oz) 88.2 kg (194 lb 7.1 oz)    12/21/19 0600   Weight: 86 kg (189 lb 9.5 oz)     Vital Signs with Ranges  Temp:  [97.7  F (36.5  C)-98.2  F (36.8  C)] 98.2  F (36.8  C)  Pulse:  [] 70  Heart Rate:  [] 69  Resp:  [10-39] 18  BP: (122-171)/(61-82) 125/67  SpO2:  [95 %-99 %] 97 %  I/O's Last 24 hours  I/O last 3 completed shifts:  In: 760 [P.O.:760]  Out: 800 [Urine:800]  Net I/O since admission  12/16 0700 - 12/21 0659  In: 3816 [P.O.:2520; I.V.:296]  Out: 1100 [Urine:1100]  Net: 1898    EXAM:    Constitutional:   In mild distress     Neck:   no JVD     Lungs:   rhonchii bilateral     Cardiovascular:   normal S1 and S2 and no murmur noted     Extremities and Back:   No edema "     Neurological:   No gross or focal neurologic abnormalities            Medications:          amLODIPine  5 mg Oral Daily     aspirin  81 mg Oral Daily     azithromycin  500 mg Intravenous Q24H     cefTRIAXone  1 g Intravenous Q24H     enoxaparin ANTICOAGULANT  40 mg Subcutaneous Q24H     ipratropium - albuterol 0.5 mg/2.5 mg/3 mL  3 mL Nebulization 4x daily     levalbuterol  1.25 mg Nebulization Q6H     predniSONE  60 mg Oral Daily     senna-docusate  1 tablet Oral BID    Or     senna-docusate  2 tablet Oral BID     sodium chloride (PF)  3 mL Intracatheter Q8H     triamterene-HCTZ  1 tablet Oral Daily            Data:      All new lab and imaging data was reviewed.   Recent Labs   Lab Test 12/21/19 0646 12/20/19 1620 12/19/19 0626 12/17/19 2349 01/26/19  1045   WBC  --   --  6.4 8.5 5.3   HGB  --   --  12.5 13.3 13.7   MCV  --   --  92 91 89    220 189 204 214   INR  --   --  1.00  --   --       Recent Labs   Lab Test 12/21/19  0646 12/20/19  1620 12/19/19  0626 12/18/19 0618 12/17/19 2349 01/26/19  1045   NA  --   --  138  --  136 138   POTASSIUM  --   --  3.8 3.6 3.4 3.5   CHLORIDE  --   --  104  --  99 101   CO2  --   --  33*  --  32 29   BUN  --   --  21  --  17 15   CR 0.68 0.59 0.64  --  0.83 0.84   ANIONGAP  --   --  1*  --  5 8   TANISHA  --   --  8.7  --  8.9 9.1   GLC  --   --  138*  --  161* 108*     Recent Labs   Lab Test 12/18/19  1853 12/18/19  1541 12/18/19  1017   TROPI 3.725* 1.716* 1.734*              Imaging:   No results found for this or any previous visit (from the past 24 hour(s)).

## 2019-12-21 NOTE — PLAN OF CARE
Patient is A/O x4. HTN (130s-150s) and tachy(100s) at times otherwise VSS on 4 L NC. Pt intermittent on BiPAP overnight while sleeping. LS diminihsed, occasionally expiratory wheezes in upper lobes, coarse cough at times, prn Robitussin x1, effective. Pt c/o SOB, BROWN. Patient denies pain. Voiding in BR.  Up with SBA. Regular diet, tolerating well. Plan on continuing to monitor respiratory status and angio on Monday.

## 2019-12-21 NOTE — PLAN OF CARE
A&O x4. O2 stable on 3-4L NC, BP elevated, tachycardic. Tele Sinus tach. Up SBA. Denies pain. LS diminished. Refused IS. BS+. Flatus+. Urine output adequate. Expresses feeling anxiety, PRN ativan given once and pt reported feeling improvement. Still feels short of breath despite O2 sats being in the high 90s, requesting bipap for overnight.

## 2019-12-22 LAB
LACTATE BLD-SCNC: 0.9 MMOL/L (ref 0.7–2)
NT-PROBNP SERPL-MCNC: 3235 PG/ML (ref 0–1800)
POTASSIUM SERPL-SCNC: 3.5 MMOL/L (ref 3.4–5.3)

## 2019-12-22 PROCEDURE — 36415 COLL VENOUS BLD VENIPUNCTURE: CPT | Performed by: HOSPITALIST

## 2019-12-22 PROCEDURE — 25000131 ZZH RX MED GY IP 250 OP 636 PS 637: Mod: GY | Performed by: HOSPITALIST

## 2019-12-22 PROCEDURE — 94640 AIRWAY INHALATION TREATMENT: CPT

## 2019-12-22 PROCEDURE — 25000128 H RX IP 250 OP 636: Performed by: HOSPITALIST

## 2019-12-22 PROCEDURE — 99232 SBSQ HOSP IP/OBS MODERATE 35: CPT | Performed by: HOSPITALIST

## 2019-12-22 PROCEDURE — 84132 ASSAY OF SERUM POTASSIUM: CPT | Performed by: INTERNAL MEDICINE

## 2019-12-22 PROCEDURE — 40000275 ZZH STATISTIC RCP TIME EA 10 MIN

## 2019-12-22 PROCEDURE — 25000132 ZZH RX MED GY IP 250 OP 250 PS 637: Mod: GY | Performed by: INTERNAL MEDICINE

## 2019-12-22 PROCEDURE — 25000125 ZZHC RX 250: Performed by: INTERNAL MEDICINE

## 2019-12-22 PROCEDURE — 25000132 ZZH RX MED GY IP 250 OP 250 PS 637: Mod: GY | Performed by: HOSPITALIST

## 2019-12-22 PROCEDURE — 83605 ASSAY OF LACTIC ACID: CPT | Performed by: HOSPITALIST

## 2019-12-22 PROCEDURE — 94640 AIRWAY INHALATION TREATMENT: CPT | Mod: 76

## 2019-12-22 PROCEDURE — 99233 SBSQ HOSP IP/OBS HIGH 50: CPT | Performed by: INTERNAL MEDICINE

## 2019-12-22 PROCEDURE — 36415 COLL VENOUS BLD VENIPUNCTURE: CPT | Performed by: INTERNAL MEDICINE

## 2019-12-22 PROCEDURE — 12000000 ZZH R&B MED SURG/OB

## 2019-12-22 RX ORDER — LIDOCAINE 40 MG/G
CREAM TOPICAL
Status: CANCELLED | OUTPATIENT
Start: 2019-12-22

## 2019-12-22 RX ORDER — FUROSEMIDE 10 MG/ML
20 INJECTION INTRAMUSCULAR; INTRAVENOUS ONCE
Status: COMPLETED | OUTPATIENT
Start: 2019-12-22 | End: 2019-12-22

## 2019-12-22 RX ORDER — POTASSIUM CHLORIDE 1500 MG/1
20 TABLET, EXTENDED RELEASE ORAL
Status: COMPLETED | OUTPATIENT
Start: 2019-12-22 | End: 2019-12-22

## 2019-12-22 RX ADMIN — CEFTRIAXONE SODIUM 1 G: 1 INJECTION, POWDER, FOR SOLUTION INTRAMUSCULAR; INTRAVENOUS at 11:13

## 2019-12-22 RX ADMIN — IPRATROPIUM BROMIDE 0.5 MG: 0.5 SOLUTION RESPIRATORY (INHALATION) at 07:53

## 2019-12-22 RX ADMIN — LEVALBUTEROL HYDROCHLORIDE 1.25 MG: 1.25 SOLUTION, CONCENTRATE RESPIRATORY (INHALATION) at 07:53

## 2019-12-22 RX ADMIN — GUAIFENESIN 1200 MG: 600 TABLET, EXTENDED RELEASE ORAL at 08:19

## 2019-12-22 RX ADMIN — LORAZEPAM 0.25 MG: 0.5 TABLET ORAL at 21:27

## 2019-12-22 RX ADMIN — GUAIFENESIN 1200 MG: 600 TABLET, EXTENDED RELEASE ORAL at 21:18

## 2019-12-22 RX ADMIN — POTASSIUM CHLORIDE 20 MEQ: 1500 TABLET, EXTENDED RELEASE ORAL at 18:41

## 2019-12-22 RX ADMIN — AMLODIPINE BESYLATE 5 MG: 5 TABLET ORAL at 08:19

## 2019-12-22 RX ADMIN — LEVALBUTEROL HYDROCHLORIDE 1.25 MG: 1.25 SOLUTION, CONCENTRATE RESPIRATORY (INHALATION) at 18:22

## 2019-12-22 RX ADMIN — IPRATROPIUM BROMIDE 0.5 MG: 0.5 SOLUTION RESPIRATORY (INHALATION) at 18:22

## 2019-12-22 RX ADMIN — IPRATROPIUM BROMIDE 0.5 MG: 0.5 SOLUTION RESPIRATORY (INHALATION) at 12:51

## 2019-12-22 RX ADMIN — PREDNISONE 60 MG: 20 TABLET ORAL at 08:19

## 2019-12-22 RX ADMIN — LEVALBUTEROL HYDROCHLORIDE 1.25 MG: 1.25 SOLUTION, CONCENTRATE RESPIRATORY (INHALATION) at 12:51

## 2019-12-22 RX ADMIN — ASPIRIN 81 MG: 81 TABLET, COATED ORAL at 08:19

## 2019-12-22 RX ADMIN — AZITHROMYCIN MONOHYDRATE 500 MG: 500 INJECTION, POWDER, LYOPHILIZED, FOR SOLUTION INTRAVENOUS at 13:05

## 2019-12-22 RX ADMIN — FUROSEMIDE 20 MG: 10 INJECTION, SOLUTION INTRAVENOUS at 16:47

## 2019-12-22 RX ADMIN — TRIAMTERENE AND HYDROCHLOROTHIAZIDE 1 TABLET: 37.5; 25 TABLET ORAL at 08:19

## 2019-12-22 RX ADMIN — ASPIRIN 325 MG: 325 TABLET, COATED ORAL at 16:47

## 2019-12-22 RX ADMIN — ENOXAPARIN SODIUM 40 MG: 40 INJECTION SUBCUTANEOUS at 14:48

## 2019-12-22 ASSESSMENT — ACTIVITIES OF DAILY LIVING (ADL)
ADLS_ACUITY_SCORE: 19

## 2019-12-22 ASSESSMENT — MIFFLIN-ST. JEOR: SCORE: 1373.63

## 2019-12-22 NOTE — PROGRESS NOTES
[facility]    Medicine Progress Note - Hospitalist Service       Date of Admission:  12/17/2019  Assessment & Plan   Nena Sanders is a 79 year old female with history of COPD, O2 dependent at night (on 4L), pulmonary nodules, sleep apnea, HTN, and macular degeneration who presented to the ED with SOB,  admitted for COPD exacerbation.  - PTA was recently started on levofloxacin and prednisone burst/taper per Pulioana THOMAS on 12/14     COPD exacerbation  Acute on chronic hypoxic respiratory failure  History of pulmonary nodules  History of sleep apnea (mild) and nocturnal O2 dependent (4lts by NC)  CXR on admission no significant infiltrates. Blood cultures 12/17 with no growth so far, influenza negative. CT chest showed no PE but moderate bronchial wall thickening, bronchiectasis, mucous  Plugging as well as Bilateral groundglass infiltrates and scattered consolidative changes in a bronchovascular distribution.  - change Cefepime to Ceftriaxone and Azithromycin. No indication for pseudomonal coverage.  - change nebs to xopenex given tachycardia. Add ipratropium.  - Change IV steroids to prednisone 60mg daily  - Pulmonology consulted. Greatly appreciate recommendations.   - Mucinex started   - Increase activity and incentive spirometry.    - Recommend outpatient pulmonary followup with Dr. Miller at [facility].      Elevated troponin, demand ischemia vs NSTEMI  No CAD history  - trops peaked to 3.725  - evaluated by cardiology; suggest likely demand ischemia in the setting of hypoxia related to COPD exacerbation but can't rule out CAD; patient denies any chest pain  - started on ASA 81 mg daily; Heparin drip d/eugene as ACS less likely  - ECHO 12/19 noted as technically difficult study but with EF 60-65% and no significant wall motion abnormalities  - Dr Polo plans for cardiac cath on Monday     HTN  - continue PTA Dyazide  - Amlodipine 5mg daily added for better control. Will titrate  up as needed.    Diet: Regular Diet Adult    DVT Prophylaxis: Enoxaparin (Lovenox) SQ  Buenrostro Catheter: not present  Code Status: Full Code      Disposition Plan   Expected discharge: 2 - 3 days, recommended to prior living arrangement once angiogram Monday, clinical improvement.  Entered: Brittany Jett MD 12/21/2019, 6:46 PM       The patient's care was discussed with the Bedside Nurse and Patient.    Brittany Jett MD  Hospitalist Service  [facility]    ______________________________________________________________________    Interval History   Patient feels better today. Anxiety improved with ativan. Denies chest pain. Tolerating diet. Moving bowels. Feels chest is congested and she can't cough up mucous. Denies fevers or chills. Aware of plan for pulm consult per discussion with previous provider, then cath Monday if pulm symptoms improve sufficiently.    Data reviewed today: I reviewed all medications, new labs and imaging results over the last 24 hours. I personally reviewed no images or EKG's today.    Physical Exam   Vital Signs: Temp: 98.7  F (37.1  C) Temp src: Axillary BP: (!) 150/81 Pulse: 103 Heart Rate: 101 Resp: 13 SpO2: 99 % O2 Device: Nasal cannula Oxygen Delivery: 3 LPM  Weight: 189 lbs 9.53 oz  Constitutional: awake, alert, cooperative, mild resp distress, and appears stated age  Respiratory: increased work of breathing,no significant wheezing  Cardiovascular: tachycardia  GI: No scars, normal bowel sounds, soft, non-distended, non-tender, no masses palpated, no hepatosplenomegally  Neuropsychiatric: General: normal, calm and normal eye contact  Affect: normal    Data   Recent Labs   Lab 12/21/19  0646 12/20/19  1620 12/19/19  0626 12/18/19  1853 12/18/19  1541 12/18/19  1017 12/18/19  0618  12/17/19  2349   WBC  --   --  6.4  --   --   --   --   --  8.5   HGB  --   --  12.5  --   --   --   --   --  13.3   MCV  --   --  92  --   --   --   --   --  91    220 189  --    --   --   --   --  204   INR  --   --  1.00  --   --   --   --   --   --    NA  --   --  138  --   --   --   --   --  136   POTASSIUM  --   --  3.8  --   --   --  3.6  --  3.4   CHLORIDE  --   --  104  --   --   --   --   --  99   CO2  --   --  33*  --   --   --   --   --  32   BUN  --   --  21  --   --   --   --   --  17   CR 0.68 0.59 0.64  --   --   --   --   --  0.83   ANIONGAP  --   --  1*  --   --   --   --   --  5   TANISHA  --   --  8.7  --   --   --   --   --  8.9   GLC  --   --  138*  --   --   --   --   --  161*   ALBUMIN  --   --  3.2*  --   --   --   --   --   --    PROTTOTAL  --   --  7.2  --   --   --   --   --   --    BILITOTAL  --   --  0.2  --   --   --   --   --   --    ALKPHOS  --   --  53  --   --   --   --   --   --    ALT  --   --  23  --   --   --   --   --   --    AST  --   --  34  --   --   --   --   --   --    TROPI  --   --   --  3.725* 1.716* 1.734* 1.413*   < > 0.054*    < > = values in this interval not displayed.     No results found for this or any previous visit (from the past 24 hour(s)).  Medications       amLODIPine  5 mg Oral Daily     aspirin  81 mg Oral Daily     azithromycin  500 mg Intravenous Q24H     cefTRIAXone  1 g Intravenous Q24H     enoxaparin ANTICOAGULANT  40 mg Subcutaneous Q24H     guaiFENesin  1,200 mg Oral BID     ipratropium  0.5 mg Nebulization Q6H     levalbuterol  1.25 mg Nebulization Q6H     predniSONE  60 mg Oral Daily     senna-docusate  1 tablet Oral BID    Or     senna-docusate  2 tablet Oral BID     sodium chloride (PF)  3 mL Intracatheter Q8H     triamterene-HCTZ  1 tablet Oral Daily

## 2019-12-22 NOTE — PROGRESS NOTES
"Cardiology Progress Note  Sanford Polo MD         Assessment and Plan:      COPD exacerbation with acute respiratory failure  Overall looks better today.    Still has intermittent cough.  Improvement is there but very gradual.  The hospitalist was wondering about her associated fluid overload.  It is unlikely although N-terminal proBNP is mildly elevated at 3225.  She is on triamterene hydrochlorothiazide.  Could consider as needed dose of Lasix.    Elevated troponin  Peak troponin of 3.7.    This is likely secondary from hypoxia and respiratory failure but I cannot exclude severe CAD and non-ST elevation myocardial infarction.  Echo reveals normal ejection fraction.  Discussed with her and daughter.    Patient and daughter have agreed to proceed with coronary angiography on Monday.  Risks and benefits of left heart catheterization and coronary angiogram were discussed with the patient in detail. 0.1-0.3% (for diagnostic angio) and 1-2% (for PCI)  risk of stroke, MI, death, emergent bypass for diagnostic angio, risk of contrast induced allergic reaction, renal dysfunction, vascular complications were discussed. Pros and cons of bare metal Vs drug eluting stents was discussed. Patient understands and wishes to proceed with it.                       Interval History:   Shortness of breath improved          Review of Systems:   The 5 point Review of Systems is negative other than noted in the HPI          Physical Exam:        Blood pressure (!) 144/63, pulse 97, temperature 98  F (36.7  C), temperature source Oral, resp. rate 21, height 1.702 m (5' 7\"), weight 86.1 kg (189 lb 13.1 oz), SpO2 99 %.  Vitals:    12/20/19 0600 12/21/19 0600 12/22/19 0500   Weight: 88.2 kg (194 lb 7.1 oz) 86 kg (189 lb 9.5 oz) 86.1 kg (189 lb 13.1 oz)     Weights since admission  Vitals:    12/17/19 2320 12/18/19 0600 12/19/19 0600 12/20/19 0600   Weight: 79.4 kg (175 lb) 79.5 kg (175 lb 4.3 oz) 88.5 kg (195 lb 1.7 oz) 88.2 kg (194 " lb 7.1 oz)    12/21/19 0600 12/22/19 0500   Weight: 86 kg (189 lb 9.5 oz) 86.1 kg (189 lb 13.1 oz)     Vital Signs with Ranges  Temp:  [97.7  F (36.5  C)-98.7  F (37.1  C)] 98  F (36.7  C)  Pulse:  [] 97  Heart Rate:  [] 98  Resp:  [11-25] 21  BP: (124-163)/() 144/63  SpO2:  [95 %-99 %] 99 %  I/O's Last 24 hours  I/O last 3 completed shifts:  In: 1530 [P.O.:1530]  Out: 2200 [Urine:2200]  Net I/O since admission  12/17 0700 - 12/22 0659  In: 5346 [P.O.:4050; I.V.:296]  Out: 3300 [Urine:3300]  Net: 2046    EXAM:    Constitutional:   In mild distress     Neck:   no JVD     Lungs:   rhonchii bilateral     Cardiovascular:   normal S1 and S2 and no murmur noted     Extremities and Back:   No edema     Neurological:   No gross or focal neurologic abnormalities            Medications:          amLODIPine  5 mg Oral Daily     aspirin  81 mg Oral Daily     azithromycin  500 mg Intravenous Q24H     cefTRIAXone  1 g Intravenous Q24H     enoxaparin ANTICOAGULANT  40 mg Subcutaneous Q24H     guaiFENesin  1,200 mg Oral BID     ipratropium  0.5 mg Nebulization Q6H     levalbuterol  1.25 mg Nebulization Q6H     predniSONE  60 mg Oral Daily     senna-docusate  1 tablet Oral BID    Or     senna-docusate  2 tablet Oral BID     sodium chloride (PF)  3 mL Intracatheter Q8H     triamterene-HCTZ  1 tablet Oral Daily            Data:      All new lab and imaging data was reviewed.   Recent Labs   Lab Test 12/21/19  0646 12/20/19  1620 12/19/19  0626 12/17/19  2349 01/26/19  1045   WBC  --   --  6.4 8.5 5.3   HGB  --   --  12.5 13.3 13.7   MCV  --   --  92 91 89    220 189 204 214   INR  --   --  1.00  --   --       Recent Labs   Lab Test 12/21/19  0646 12/20/19  1620 12/19/19  0626 12/18/19  0618 12/17/19  2349 01/26/19  1045   NA  --   --  138  --  136 138   POTASSIUM  --   --  3.8 3.6 3.4 3.5   CHLORIDE  --   --  104  --  99 101   CO2  --   --  33*  --  32 29   BUN  --   --  21  --  17 15   CR 0.68 0.59 0.64  --   0.83 0.84   ANIONGAP  --   --  1*  --  5 8   TANISHA  --   --  8.7  --  8.9 9.1   GLC  --   --  138*  --  161* 108*     Recent Labs   Lab Test 12/18/19  1853 12/18/19  1541 12/18/19  1017   TROPI 3.725* 1.716* 1.734*              Imaging:   No results found for this or any previous visit (from the past 24 hour(s)).

## 2019-12-22 NOTE — PLAN OF CARE
Pt off BIPAP since 9:30 this am, tolerates nasal canula at 3L, lungs diminished, productive cough, IS encouraged, up in room with assist of one, short of breath with activity, good urine output, passing flatus, denies pain, tolerates diet, ativan given PRN for anxiety

## 2019-12-22 NOTE — PROGRESS NOTES
Patient is on a 2L NC with SpO2 in the mid 90's. BS diminished. All nebs were given as ordered.  Will cont to follow.  12/22/2019  Jenny Pérez, RT

## 2019-12-22 NOTE — PROGRESS NOTES
Pt appears anxious during 1900 neb tx. Bipap in room. BBS clear/dim. On 3 lpm nc. Advised can keep Bipap in room prn, although not needed at this time. RN notified, need prn Bipap order.     Jose Luis Stanton, RT

## 2019-12-22 NOTE — CONSULTS
Consult Date:  12/21/2019      PULMONARY CONSULTATION      REASON FOR CONSULTATION:  COPD exacerbation.      HISTORY OF PRESENT ILLNESS:  The patient is a 79-year-old female with a history of COPD, on nocturnal oxygen, untreated obstructive sleep apnea, benign pulmonary nodules (CT scan of the chest 09/27/2019 showed no change in the bilateral pulmonary nodules versus 06/21/2012), and mild bronchiectasis who was admitted with a 3-day history of worsening shortness of breath and cough productive of greenish sputum.  The patient is usually maintained on Advair 250/50 and she has DuoNebs and albuterol inhaler that she uses on a p.r.n. basis.  She was recently treated with a course of Levaquin and prednisone by her usual pulmonologist, Dr. Yris Miller at [facility].  Despite the outpatient therapy, her symptoms failed to improve and she developed worsening shortness of breath, low-grade fever and tachycardia.  She has not had any rigors, night sweats, chest pain or hemoptysis.  We are now consulted regarding further evaluation and management.      CURRENT MEDICATIONS:  Include Norvasc, aspirin 81 mg, azithromycin, Rocephin, Lovenox, Xopenex, prednisone 60 mg daily and Maxzide.      PAST MEDICAL HISTORY:   1.  COPD.   2.  Hypertension.   3.  Chronic rhinitis.   4.  Obstructive sleep apnea.   5.  Benign pulmonary nodules.   6.  Hip arthritis.      ALLERGIES:  NO KNOWN ALLERGIES.      SOCIAL HISTORY:  Noncontributory.      FAMILY HISTORY:  Noncontributory.      REVIEW OF SYSTEMS:  Noncontributory.      PHYSICAL EXAMINATION:   GENERAL:  Reveals a pleasant older female sitting up in a chair in no respiratory distress.   VITAL SIGNS:  She is afebrile, respiratory rate is 22, pulse is 80-90, blood pressure 161/82, oxygen saturations 97% on 3 liters per nasal cannula.   HEENT:  Exam is grossly unremarkable.   NECK:  Supple.   PULMONARY:  Lungs revealed decreased breath sounds, prolonged expiratory phase  and poor air exchange.   CARDIOVASCULAR:  Cardiac exam revealed regular rate and rhythm.   ABDOMEN:  Obese, soft and nontender.   EXTREMITIES:  Exam showed trace pedal edema but no cyanosis or clubbing.      LABORATORY TESTING:  Electrolytes are normal with the exception of a bicarbonate of 33.  Renal function is normal.  Venous blood gas pH 7.30, pCO2 65, pO2 31.  White count, hemoglobin, hematocrit and platelet counts are all normal.     Chest x-ray on admission showed no acute infiltrates.     CT scan of the chest done yesterday was negative for PE and demonstrated moderate bronchial wall thickening, bronchiectasis and mucus plugging.      ASSESSMENT:  A 79-year-old female with history of COPD, on nocturnal oxygen, mild bronchiectasis who is admitted with an exacerbation.  CT scan of the chest demonstrates that her exacerbation is likely secondary to bronchitis.  The patient appears to be slowly improving on current therapy.  She did require BiPAP transiently overnight.  She has been changed from DuoNebs to Xopenex due to tachycardia and I would recommend adding ipratropium to her Xopenex nebs.  I agree with her antibiotics and steroids as ordered.  Suggest adding mucolytics to her regimen as well.  Respiratory status is currently stable on low flow oxygen.      PLAN:   1.  Titrate oxygen to keep SaO2 greater than 90%.   2.  Bronchodilators, continue Xopenex nebs, add ipratropium to her Xopenex.   3.  Antibiotics, azithromycin and Rocephin.   4.  Steroids, prednisone.   5.  Mucolytics, start Mucinex.   6.  Increase activity and incentive spirometry.   7.  Recommend outpatient pulmonary followup with Dr. Miller at [facility].      The situation was reviewed with the patient's daughter at the bedside.  I appreciate the opportunity to participate in her care.         STEVE JACOB MD             D: 12/21/2019   T: 12/21/2019   MT: JAYLENE      Name:     VINNY MARISCAL   MRN:      [id number]         Account:       [id number]   :      1940           Consult Date:  2019      Document: [id number]       cc: Titus Polo MD

## 2019-12-22 NOTE — PROGRESS NOTES
[facility]    Medicine Progress Note - Hospitalist Service       Date of Admission:  12/17/2019  Assessment & Plan   Nena Sanders is a 79 year old female with history of COPD, O2 dependent at night (on 4L), pulmonary nodules, sleep apnea, HTN, and macular degeneration who presented to the ED with SOB,  admitted for COPD exacerbation.  - PTA was recently started on levofloxacin and prednisone burst/taper per Pulioana THOMAS on 12/14     COPD exacerbation  Acute on chronic hypoxic respiratory failure  History of pulmonary nodules  History of sleep apnea (mild) and nocturnal O2 dependent (4lts by NC)  CXR on admission no significant infiltrates. Blood cultures 12/17 with no growth so far, influenza negative. CT chest showed no PE but moderate bronchial wall thickening, bronchiectasis, mucous  plugging as well as bilateral groundglass infiltrates and scattered consolidative changes in a bronchovascular distribution.  - change Cefepime to Ceftriaxone and Azithromycin. No indication for pseudomonal coverage.  - change nebs to xopenex given tachycardia. Add ipratropium.  - Change IV steroids to prednisone 60mg daily  - Pulmonology consulted. Greatly appreciate recommendations.   - Mucinex started   - Increase activity and incentive spirometry.    - Recommend outpatient pulmonary followup with Dr. Miller at [facility].      Elevated troponin, demand ischemia vs NSTEMI  No CAD history  HTN  - trops peaked to 3.725  - evaluated by cardiology; suggest likely demand ischemia in the setting of hypoxia related to COPD exacerbation but can't rule out CAD; patient denies any chest pain  - started on ASA 81 mg daily; Heparin drip d/eugene as ACS less likely  - ECHO 12/19 noted as technically difficult study but with EF 60-65% and no significant wall motion abnormalities  - Dr Polo plans for cardiac cath on Monday. Discussed today.  - pro BNP elevated at 3k  - hold PTA dyazide tomorrow prior to cath.  Will restart if creat stable post procedure.   - Will give 1 dose 20mg IV lasix today.   - Amlodipine 5mg daily added for better control. Will titrate up as needed.    Diet: Regular Diet Adult    DVT Prophylaxis: Enoxaparin (Lovenox) SQ  Buenrostro Catheter: not present  Code Status: Full Code      Disposition Plan   Expected discharge: 1-3 days, recommended to prior living arrangement once angiogram Monday, clinical improvement.  Entered: Brittany Jett MD 12/22/2019, 8:56 AM       The patient's care was discussed with the Bedside Nurse, Patient and cardiology Consultant.    Brittany Jett MD  Hospitalist Service  [facility]    ______________________________________________________________________    Interval History   Patient continues to improve day by day. Anxiety improves with ativan. She has not required further bipap but was uncomfortable without it in the room. Denies chest pain. Tolerating diet. Moving bowels. Denies fevers or chills. Appears to be breathing more comfortably today but still requiring daytime oxygen.    Data reviewed today: I reviewed all medications, new labs and imaging results over the last 24 hours. I personally reviewed no images or EKG's today.    Physical Exam   Vital Signs: Temp: 98  F (36.7  C) Temp src: Oral BP: 124/69 Pulse: 95 Heart Rate: 92 Resp: 17 SpO2: 95 % O2 Device: Nasal cannula Oxygen Delivery: 4 LPM  Weight: 189 lbs 13.06 oz  Constitutional: awake, alert, cooperative, mild resp distress, and appears stated age  Respiratory: increased work of breathing,no significant wheezing  Cardiovascular: tachycardia  GI: No scars, normal bowel sounds, soft, non-distended, non-tender, no masses palpated, no hepatosplenomegally  Neuropsychiatric: General: normal, calm and normal eye contact  Affect: normal    Data   Recent Labs   Lab 12/21/19  0646 12/20/19  1620 12/19/19  0626 12/18/19  1853 12/18/19  1541 12/18/19  1017 12/18/19  0618  12/17/19  2349   WBC  --   --   6.4  --   --   --   --   --  8.5   HGB  --   --  12.5  --   --   --   --   --  13.3   MCV  --   --  92  --   --   --   --   --  91    220 189  --   --   --   --   --  204   INR  --   --  1.00  --   --   --   --   --   --    NA  --   --  138  --   --   --   --   --  136   POTASSIUM  --   --  3.8  --   --   --  3.6  --  3.4   CHLORIDE  --   --  104  --   --   --   --   --  99   CO2  --   --  33*  --   --   --   --   --  32   BUN  --   --  21  --   --   --   --   --  17   CR 0.68 0.59 0.64  --   --   --   --   --  0.83   ANIONGAP  --   --  1*  --   --   --   --   --  5   TANISHA  --   --  8.7  --   --   --   --   --  8.9   GLC  --   --  138*  --   --   --   --   --  161*   ALBUMIN  --   --  3.2*  --   --   --   --   --   --    PROTTOTAL  --   --  7.2  --   --   --   --   --   --    BILITOTAL  --   --  0.2  --   --   --   --   --   --    ALKPHOS  --   --  53  --   --   --   --   --   --    ALT  --   --  23  --   --   --   --   --   --    AST  --   --  34  --   --   --   --   --   --    TROPI  --   --   --  3.725* 1.716* 1.734* 1.413*   < > 0.054*    < > = values in this interval not displayed.     No results found for this or any previous visit (from the past 24 hour(s)).  Medications     - MEDICATION INSTRUCTIONS -         amLODIPine  5 mg Oral Daily     aspirin  81 mg Oral Daily     azithromycin  500 mg Intravenous Q24H     cefTRIAXone  1 g Intravenous Q24H     enoxaparin ANTICOAGULANT  40 mg Subcutaneous Q24H     guaiFENesin  1,200 mg Oral BID     ipratropium  0.5 mg Nebulization Q6H     levalbuterol  1.25 mg Nebulization Q6H     predniSONE  60 mg Oral Daily     senna-docusate  1 tablet Oral BID    Or     senna-docusate  2 tablet Oral BID     sodium chloride (PF)  3 mL Intracatheter Q8H     triamterene-HCTZ  1 tablet Oral Daily

## 2019-12-22 NOTE — PROGRESS NOTES
Hospitalist service cross-cover note:     Ordered BiPAP PRN per RT request.     Inocencio Yanez MD   Hospitalist  [phone]

## 2019-12-23 ENCOUNTER — SURGERY (OUTPATIENT)
Age: 79
End: 2019-12-23
Payer: MEDICARE

## 2019-12-23 PROBLEM — I21.4 NSTEMI (NON-ST ELEVATED MYOCARDIAL INFARCTION) (H): Status: ACTIVE | Noted: 2019-12-17

## 2019-12-23 LAB
ANION GAP SERPL CALCULATED.3IONS-SCNC: <1 MMOL/L (ref 3–14)
BUN SERPL-MCNC: 29 MG/DL (ref 7–30)
CALCIUM SERPL-MCNC: 8.8 MG/DL (ref 8.5–10.1)
CHLORIDE SERPL-SCNC: 98 MMOL/L (ref 94–109)
CO2 SERPL-SCNC: 42 MMOL/L (ref 20–32)
CREAT SERPL-MCNC: 0.74 MG/DL (ref 0.52–1.04)
ERYTHROCYTE [DISTWIDTH] IN BLOOD BY AUTOMATED COUNT: 14.7 % (ref 10–15)
GFR SERPL CREATININE-BSD FRML MDRD: 78 ML/MIN/{1.73_M2}
GLUCOSE SERPL-MCNC: 94 MG/DL (ref 70–99)
HCT VFR BLD AUTO: 43.8 % (ref 35–47)
HGB BLD-MCNC: 13.5 G/DL (ref 11.7–15.7)
INTERPRETATION ECG - MUSE: NORMAL
MCH RBC QN AUTO: 28.1 PG (ref 26.5–33)
MCHC RBC AUTO-ENTMCNC: 30.8 G/DL (ref 31.5–36.5)
MCV RBC AUTO: 91 FL (ref 78–100)
PLATELET # BLD AUTO: 240 10E9/L (ref 150–450)
POTASSIUM SERPL-SCNC: 3.4 MMOL/L (ref 3.4–5.3)
RBC # BLD AUTO: 4.8 10E12/L (ref 3.8–5.2)
SODIUM SERPL-SCNC: 140 MMOL/L (ref 133–144)
WBC # BLD AUTO: 7.4 10E9/L (ref 4–11)

## 2019-12-23 PROCEDURE — 99152 MOD SED SAME PHYS/QHP 5/>YRS: CPT | Performed by: INTERNAL MEDICINE

## 2019-12-23 PROCEDURE — B2111ZZ FLUOROSCOPY OF MULTIPLE CORONARY ARTERIES USING LOW OSMOLAR CONTRAST: ICD-10-PCS | Performed by: INTERNAL MEDICINE

## 2019-12-23 PROCEDURE — 25000128 H RX IP 250 OP 636: Performed by: INTERNAL MEDICINE

## 2019-12-23 PROCEDURE — 85027 COMPLETE CBC AUTOMATED: CPT | Performed by: HOSPITALIST

## 2019-12-23 PROCEDURE — 25000131 ZZH RX MED GY IP 250 OP 636 PS 637: Mod: GY | Performed by: HOSPITALIST

## 2019-12-23 PROCEDURE — 36415 COLL VENOUS BLD VENIPUNCTURE: CPT | Performed by: HOSPITALIST

## 2019-12-23 PROCEDURE — 99232 SBSQ HOSP IP/OBS MODERATE 35: CPT | Mod: 25 | Performed by: INTERNAL MEDICINE

## 2019-12-23 PROCEDURE — 25000125 ZZHC RX 250: Performed by: INTERNAL MEDICINE

## 2019-12-23 PROCEDURE — 94640 AIRWAY INHALATION TREATMENT: CPT

## 2019-12-23 PROCEDURE — 25000132 ZZH RX MED GY IP 250 OP 250 PS 637: Mod: GY | Performed by: INTERNAL MEDICINE

## 2019-12-23 PROCEDURE — 25000128 H RX IP 250 OP 636: Performed by: HOSPITALIST

## 2019-12-23 PROCEDURE — C1769 GUIDE WIRE: HCPCS | Performed by: INTERNAL MEDICINE

## 2019-12-23 PROCEDURE — 94640 AIRWAY INHALATION TREATMENT: CPT | Mod: 76

## 2019-12-23 PROCEDURE — 25800030 ZZH RX IP 258 OP 636: Performed by: INTERNAL MEDICINE

## 2019-12-23 PROCEDURE — 25000132 ZZH RX MED GY IP 250 OP 250 PS 637: Mod: GY | Performed by: HOSPITALIST

## 2019-12-23 PROCEDURE — C1887 CATHETER, GUIDING: HCPCS | Performed by: INTERNAL MEDICINE

## 2019-12-23 PROCEDURE — 99232 SBSQ HOSP IP/OBS MODERATE 35: CPT | Performed by: HOSPITALIST

## 2019-12-23 PROCEDURE — 80048 BASIC METABOLIC PNL TOTAL CA: CPT | Performed by: HOSPITALIST

## 2019-12-23 PROCEDURE — 27210794 ZZH OR GENERAL SUPPLY STERILE: Performed by: INTERNAL MEDICINE

## 2019-12-23 PROCEDURE — 40000275 ZZH STATISTIC RCP TIME EA 10 MIN

## 2019-12-23 PROCEDURE — C1894 INTRO/SHEATH, NON-LASER: HCPCS | Performed by: INTERNAL MEDICINE

## 2019-12-23 PROCEDURE — 93454 CORONARY ARTERY ANGIO S&I: CPT | Performed by: INTERNAL MEDICINE

## 2019-12-23 PROCEDURE — 93454 CORONARY ARTERY ANGIO S&I: CPT | Mod: 26 | Performed by: INTERNAL MEDICINE

## 2019-12-23 PROCEDURE — 21000001 ZZH R&B HEART CARE

## 2019-12-23 RX ORDER — ATROPINE SULFATE 0.1 MG/ML
0.5 INJECTION INTRAVENOUS EVERY 5 MIN PRN
Status: ACTIVE | OUTPATIENT
Start: 2019-12-23 | End: 2019-12-24

## 2019-12-23 RX ORDER — DOBUTAMINE HYDROCHLORIDE 200 MG/100ML
2-20 INJECTION INTRAVENOUS CONTINUOUS PRN
Status: DISCONTINUED | OUTPATIENT
Start: 2019-12-23 | End: 2019-12-23 | Stop reason: HOSPADM

## 2019-12-23 RX ORDER — NITROGLYCERIN 5 MG/ML
VIAL (ML) INTRAVENOUS
Status: DISCONTINUED | OUTPATIENT
Start: 2019-12-23 | End: 2019-12-23 | Stop reason: HOSPADM

## 2019-12-23 RX ORDER — ACETAMINOPHEN 325 MG/1
650 TABLET ORAL EVERY 4 HOURS PRN
Status: DISCONTINUED | OUTPATIENT
Start: 2019-12-23 | End: 2019-12-27 | Stop reason: HOSPADM

## 2019-12-23 RX ORDER — ARGATROBAN 1 MG/ML
350 INJECTION, SOLUTION INTRAVENOUS
Status: DISCONTINUED | OUTPATIENT
Start: 2019-12-23 | End: 2019-12-23 | Stop reason: HOSPADM

## 2019-12-23 RX ORDER — EPTIFIBATIDE 2 MG/ML
180 INJECTION, SOLUTION INTRAVENOUS EVERY 10 MIN PRN
Status: DISCONTINUED | OUTPATIENT
Start: 2019-12-23 | End: 2019-12-23 | Stop reason: HOSPADM

## 2019-12-23 RX ORDER — ARGATROBAN 1 MG/ML
150 INJECTION, SOLUTION INTRAVENOUS
Status: DISCONTINUED | OUTPATIENT
Start: 2019-12-23 | End: 2019-12-23 | Stop reason: HOSPADM

## 2019-12-23 RX ORDER — FLUMAZENIL 0.1 MG/ML
0.2 INJECTION, SOLUTION INTRAVENOUS
Status: ACTIVE | OUTPATIENT
Start: 2019-12-23 | End: 2019-12-24

## 2019-12-23 RX ORDER — EPTIFIBATIDE 2 MG/ML
2 INJECTION, SOLUTION INTRAVENOUS CONTINUOUS PRN
Status: DISCONTINUED | OUTPATIENT
Start: 2019-12-23 | End: 2019-12-23 | Stop reason: HOSPADM

## 2019-12-23 RX ORDER — VERAPAMIL HYDROCHLORIDE 2.5 MG/ML
INJECTION, SOLUTION INTRAVENOUS
Status: DISCONTINUED | OUTPATIENT
Start: 2019-12-23 | End: 2019-12-23 | Stop reason: HOSPADM

## 2019-12-23 RX ORDER — FENTANYL CITRATE 50 UG/ML
25-50 INJECTION, SOLUTION INTRAMUSCULAR; INTRAVENOUS
Status: ACTIVE | OUTPATIENT
Start: 2019-12-23 | End: 2019-12-24

## 2019-12-23 RX ORDER — LORAZEPAM 2 MG/ML
0.5 INJECTION INTRAMUSCULAR
Status: DISCONTINUED | OUTPATIENT
Start: 2019-12-23 | End: 2019-12-23 | Stop reason: HOSPADM

## 2019-12-23 RX ORDER — NALOXONE HYDROCHLORIDE 0.4 MG/ML
.1-.4 INJECTION, SOLUTION INTRAMUSCULAR; INTRAVENOUS; SUBCUTANEOUS
Status: DISCONTINUED | OUTPATIENT
Start: 2019-12-23 | End: 2019-12-27 | Stop reason: HOSPADM

## 2019-12-23 RX ORDER — IOPAMIDOL 755 MG/ML
INJECTION, SOLUTION INTRAVASCULAR
Status: DISCONTINUED | OUTPATIENT
Start: 2019-12-23 | End: 2019-12-23 | Stop reason: HOSPADM

## 2019-12-23 RX ORDER — FENTANYL CITRATE 50 UG/ML
INJECTION, SOLUTION INTRAMUSCULAR; INTRAVENOUS
Status: DISCONTINUED | OUTPATIENT
Start: 2019-12-23 | End: 2019-12-23 | Stop reason: HOSPADM

## 2019-12-23 RX ORDER — PHENYLEPHRINE HCL IN 0.9% NACL 50MG/250ML
.5-6 PLASTIC BAG, INJECTION (ML) INTRAVENOUS CONTINUOUS PRN
Status: DISCONTINUED | OUTPATIENT
Start: 2019-12-23 | End: 2019-12-23 | Stop reason: HOSPADM

## 2019-12-23 RX ORDER — HEPARIN SODIUM 10000 [USP'U]/100ML
100-1000 INJECTION, SOLUTION INTRAVENOUS CONTINUOUS PRN
Status: DISCONTINUED | OUTPATIENT
Start: 2019-12-23 | End: 2019-12-23 | Stop reason: HOSPADM

## 2019-12-23 RX ORDER — NITROGLYCERIN 20 MG/100ML
.07-2 INJECTION INTRAVENOUS CONTINUOUS PRN
Status: DISCONTINUED | OUTPATIENT
Start: 2019-12-23 | End: 2019-12-23 | Stop reason: HOSPADM

## 2019-12-23 RX ORDER — SODIUM CHLORIDE 9 MG/ML
INJECTION, SOLUTION INTRAVENOUS CONTINUOUS
Status: DISCONTINUED | OUTPATIENT
Start: 2019-12-23 | End: 2019-12-23 | Stop reason: HOSPADM

## 2019-12-23 RX ORDER — DOPAMINE HYDROCHLORIDE 160 MG/100ML
2-20 INJECTION, SOLUTION INTRAVENOUS CONTINUOUS PRN
Status: DISCONTINUED | OUTPATIENT
Start: 2019-12-23 | End: 2019-12-23 | Stop reason: HOSPADM

## 2019-12-23 RX ORDER — NOREPINEPHRINE BITARTRATE 0.06 MG/ML
.03-.4 INJECTION, SOLUTION INTRAVENOUS CONTINUOUS PRN
Status: DISCONTINUED | OUTPATIENT
Start: 2019-12-23 | End: 2019-12-23 | Stop reason: HOSPADM

## 2019-12-23 RX ORDER — SODIUM CHLORIDE 9 MG/ML
INJECTION, SOLUTION INTRAVENOUS CONTINUOUS
Status: DISCONTINUED | OUTPATIENT
Start: 2019-12-23 | End: 2019-12-24

## 2019-12-23 RX ORDER — HEPARIN SODIUM 1000 [USP'U]/ML
INJECTION, SOLUTION INTRAVENOUS; SUBCUTANEOUS
Status: DISCONTINUED | OUTPATIENT
Start: 2019-12-23 | End: 2019-12-23 | Stop reason: HOSPADM

## 2019-12-23 RX ORDER — LORAZEPAM 0.5 MG/1
0.5 TABLET ORAL
Status: DISCONTINUED | OUTPATIENT
Start: 2019-12-23 | End: 2019-12-23 | Stop reason: HOSPADM

## 2019-12-23 RX ORDER — NALOXONE HYDROCHLORIDE 0.4 MG/ML
.2-.4 INJECTION, SOLUTION INTRAMUSCULAR; INTRAVENOUS; SUBCUTANEOUS
Status: ACTIVE | OUTPATIENT
Start: 2019-12-23 | End: 2019-12-24

## 2019-12-23 RX ADMIN — IPRATROPIUM BROMIDE 0.5 MG: 0.5 SOLUTION RESPIRATORY (INHALATION) at 07:34

## 2019-12-23 RX ADMIN — FENTANYL CITRATE 50 MCG: 50 INJECTION, SOLUTION INTRAMUSCULAR; INTRAVENOUS at 13:49

## 2019-12-23 RX ADMIN — LEVALBUTEROL HYDROCHLORIDE 1.25 MG: 1.25 SOLUTION, CONCENTRATE RESPIRATORY (INHALATION) at 07:34

## 2019-12-23 RX ADMIN — GUAIFENESIN 1200 MG: 600 TABLET, EXTENDED RELEASE ORAL at 20:17

## 2019-12-23 RX ADMIN — MIDAZOLAM 1 MG: 1 INJECTION INTRAMUSCULAR; INTRAVENOUS at 13:49

## 2019-12-23 RX ADMIN — SODIUM CHLORIDE: 9 INJECTION, SOLUTION INTRAVENOUS at 07:00

## 2019-12-23 RX ADMIN — IPRATROPIUM BROMIDE 0.5 MG: 0.5 SOLUTION RESPIRATORY (INHALATION) at 17:18

## 2019-12-23 RX ADMIN — SENNOSIDES AND DOCUSATE SODIUM 2 TABLET: 8.6; 5 TABLET ORAL at 20:18

## 2019-12-23 RX ADMIN — LORAZEPAM 0.25 MG: 0.5 TABLET ORAL at 17:05

## 2019-12-23 RX ADMIN — AMLODIPINE BESYLATE 5 MG: 5 TABLET ORAL at 08:09

## 2019-12-23 RX ADMIN — LORAZEPAM 0.5 MG: 2 INJECTION, SOLUTION INTRAMUSCULAR; INTRAVENOUS at 13:06

## 2019-12-23 RX ADMIN — HEPARIN SODIUM 5000 UNITS: 1000 INJECTION, SOLUTION INTRAVENOUS; SUBCUTANEOUS at 14:00

## 2019-12-23 RX ADMIN — ASPIRIN 325 MG: 325 TABLET, COATED ORAL at 08:09

## 2019-12-23 RX ADMIN — LORAZEPAM 0.5 MG: 0.5 TABLET ORAL at 22:16

## 2019-12-23 RX ADMIN — IOPAMIDOL 50 ML: 755 INJECTION, SOLUTION INTRAVENOUS at 14:07

## 2019-12-23 RX ADMIN — PREDNISONE 60 MG: 20 TABLET ORAL at 08:06

## 2019-12-23 RX ADMIN — LEVALBUTEROL HYDROCHLORIDE 1.25 MG: 1.25 SOLUTION, CONCENTRATE RESPIRATORY (INHALATION) at 17:14

## 2019-12-23 RX ADMIN — NITROGLYCERIN 200 MCG: 5 INJECTION, SOLUTION INTRAVENOUS at 14:00

## 2019-12-23 RX ADMIN — HYDRALAZINE HYDROCHLORIDE 10 MG: 20 INJECTION INTRAMUSCULAR; INTRAVENOUS at 16:14

## 2019-12-23 RX ADMIN — VERAPAMIL HYDROCHLORIDE 2.5 MG: 2.5 INJECTION, SOLUTION INTRAVENOUS at 14:00

## 2019-12-23 ASSESSMENT — ACTIVITIES OF DAILY LIVING (ADL)
ADLS_ACUITY_SCORE: 19

## 2019-12-23 ASSESSMENT — MIFFLIN-ST. JEOR: SCORE: 1367.63

## 2019-12-23 NOTE — PROGRESS NOTES
"PULMONOLOGY PROGRESS NOTE  Date of service: 2019  [facility]    CC/Reason for Visit: COPD exacerbation.    SUBJECTIVE      HPI: Events of WE reviewed. Stable night. No new respiratory problems. States breathing is better today. Cardiac cath planned for today.    ROS: A Problem Pertinent review of systems was negative except for items noted in HPI.    Past Medical, Family, and Social/Substance History has been reviewed: No interval changes.    OBJECTIVE   BP (!) 147/77 (BP Location: Right arm)   Pulse 97   Temp 97.6  F (36.4  C) (Oral)   Resp 18   Ht 1.702 m (5' 7\")   Wt 85.5 kg (188 lb 7.9 oz)   SpO2 96%   BMI 29.52 kg/m    2 L/min     Temp (24hrs), Av.7  F (36.5  C), Min:97.3  F (36.3  C), Max:98  F (36.7  C)       Intake/Output Summary (Last 24 hours) at 2019 1025  Last data filed at 2019 2130  Gross per 24 hour   Intake 1080 ml   Output 1640 ml   Net -560 ml     Patient Vitals for the past 96 hrs:   Weight   19 0651 85.5 kg (188 lb 7.9 oz)   19 0500 86.1 kg (189 lb 13.1 oz)   19 0600 86 kg (189 lb 9.5 oz)   19 0600 88.2 kg (194 lb 7.1 oz)       CONSTITUTIONAL/GENERAL APPEARANCE: Alert. No Apparent Distress.  PSYCHIATRIC: Pleasant and appropriate mood and affect. Oriented x 3.  EARS, NOSE,THROAT,MOUTH: External ears and nose overall normal. Normal oral mucosa.   NECK: Neck appearance normal. No neck masses and the thyroid is not enlarged.   RESPIRATORY: Non-labored effort. Decreased BS, prolonged exp phase, few end exp wheezes.  CARDIOVASCULAR: S1, S2, regular rate and rhythm.      LABORATORY ASSESSMENT    No lab results found in last 7 days.  Recent Labs   Lab 19  0748 19  0646  19  0626   WBC 7.4  --   --  6.4   RBC 4.80  --   --  4.30   HGB 13.5  --   --  12.5   HCT 43.8  --   --  39.4   MCV 91  --   --  92   MCH 28.1  --   --  29.1   MCHC 30.8*  --   --  31.7   RDW 14.7  --   --  15.3*    219   < > 189    < > = " values in this interval not displayed.     Recent Labs   Lab 12/23/19  0748 12/22/19  1722 12/21/19  0646 12/20/19  1620 12/19/19  0626  12/17/19  2349     --   --   --  138  --  136   POTASSIUM 3.4 3.5  --   --  3.8   < > 3.4   CHLORIDE 98  --   --   --  104  --  99   CO2 42*  --   --   --  33*  --  32   ANIONGAP <1*  --   --   --  1*  --  5   BUN 29  --   --   --  21  --  17   CR 0.74  --  0.68 0.59 0.64  --  0.83   GFRESTIMATED 78  --  83 88 85  --  67   GFRESTBLACK >90  --  >90 >90 >90  --  78   TANISHA 8.8  --   --   --  8.7  --  8.9    < > = values in this interval not displayed.     No lab results found in last 7 days.  Recent Labs   Lab 12/19/19  0626   INR 1.00       Additional labs and/or comments:    IMAGING      CT Chest 12/20 -  IMPRESSION:   1. No pulmonary embolism demonstrated.  2. No thoracic aortic dissection or aneurysm.   3. Moderate bronchial wall thickening, bronchiectasis, mucous  plugging.  4. Bilateral groundglass infiltrates and scattered consolidative  changes in a bronchovascular distribution.    CXR 12/17 -  IMPRESSION: No evidence for acute focal alveolar infiltrate or consolidation. Normal heart size. Normal pulmonary vascularity. Stable opacity involving the left lung base representing a prominent nipple shadow. No evidence for pneumothorax. Minimal  degenerative changes in the left acromioclavicular joint and spine.    PFT & OTHER TESTING     Echo 12/19 -  Interpretation Summary  Technically difficult study due to poor endocardial delineation.  Left ventricular systolic function is normal.  The visual ejection fraction is estimated at 60-65%.  Wall motion probably normal on limited views.  The right ventricle is normal in structure, function and size.  No significant valve dysfunction.  Unable to estimate PA systolic pressure.  Dilation of the inferior vena cava is present with normal respiratory variation in diameter.  There is no pericardial effusion.  Compared to prior study  dated 12/17/2015, no significant change.    ASSESSMENT / PLAN      Principal Problem:    NSTEMI (non-ST elevated myocardial infarction) (H)  Active Problems:    COPD exacerbation (H)      ASSESSMENT: 79-year-old female with a history of COPD on nocturnal O2, untreated obstructive sleep apnea, benign pulmonary nodules (CT scan of the chest 9/27/2019 showed no change in the bilateral pulmonary nodules versus 6/21/2012), mild bronchiectasis admitted with a 3-day history of worsening shortness of breath and cough productive of greenish sputum.  Patient is maintained on Advair 250/50 and DuoNebs/Albuterol prn.  She was recently treated with a course of Levaquin and prednisone by her Pulmonologist Dr. Yris Miller at [facility]. Chest x-ray on admission showed no acute infiltrates.  CT scan of the chest 12/20 was negative for PE and demonstrated moderate bronchial wall thickening, bronchiectasis and mucous plugging. Troponins elevated.  COPD exacerbation likely secondary to bronchitis.  Patient is clinically improved on current therapy.  Would continue the same for now.  Respiratory status remained stable on low-flow oxygen.    Diagnoses  Abnl CT/CXR  R91.8  Bronchiectasis J47.9  Bronchitis acute J20.9  COPD exacerb J44.1  Hypoxemia  R09.02  Sleep apnea obstr G47.33  SOB   R06.02    PLAN:  1. Adjust oxygen, keep SaO2 > 90%  2. Bronchodilators - Xopenex + Atrovent nebs   3. Antibiotics - Zithro & Rocephin.  4. Steroids - Prednisone  5. Mucolytics - Mucinex.  6. Cardiac cath planned for today.  7. Recommend outpatient Pulmonary follow-up with Dr. Miller at [facility].    Updated daughter at bedside.      Jenaro Alvarez MD    [facility]  [phone] (pager)  [phone] (office)

## 2019-12-23 NOTE — PROGRESS NOTES
Spiritual Health    SH visited Pt per length of stay. Pt has no SH needs at this time.     SH will remain available as needed.     Ariana Purcell  Chaplain Resident

## 2019-12-23 NOTE — PLAN OF CARE
VSS on 2L NC, O2 sats in mid 90's. A&O x4. Denies pain. Lung sounds diminished with crackles in the base. SOB w/ exertion. Bowel sounds active x4. Up SBA. NPO. Voiding adequately in BR. Plan for angio this morning. Continue to monitor.

## 2019-12-23 NOTE — PROCEDURES
[facility]    Procedure: *Cath without PCI  Date/Time: 12/23/2019 2:17 PM  Performed by: Len Anthony MD  Authorized by: Len Anthony MD     UNIVERSAL PROTOCOL   Site Marked: Yes  Prior Images Obtained and Reviewed:  Yes  Required items: Required blood products, implants, devices and special equipment available    Patient identity confirmed:  Verbally with patient  Patient was reevaluated immediately before administering moderate or deep sedation or anesthesia  Confirmation Checklist:  Patient's identity using two indicators  Time out: Immediately prior to the procedure a time out was called    Universal Protocol: the Joint Commission Universal Protocol was followed    Preparation: Patient was prepped and draped in usual sterile fashion           ANESTHESIA    Local Anesthetic: Lidocaine 1% without epinephrine      SEDATION    Patient Sedated: Yes    Sedation:  Midazolam and fentanyl  Vital signs: Vital signs monitored during sedation    PROCEDURE   Patient Tolerance:  Patient tolerated the procedure well with no immediate complications     Procedure  1)CAG  Indication NSTEMI in course of COPD excacerbation  Approach RTR  Complications none  Findings  Mild generalized atheromatous change with no focal significant stenosis    Assesment : Troponin elevation likely to due to R heart strain from COPD. No indication for mechanical revascularization.     Raj  Length of time physician/provider present for 1:1 monitoring during sedation: 15

## 2019-12-23 NOTE — PROGRESS NOTES
[facility]    Medicine Progress Note - Hospitalist Service       Date of Admission:  12/17/2019  Assessment & Plan   Nena Sanders is a 79 year old female with history of COPD, O2 dependent at night (on 4L), pulmonary nodules, sleep apnea, HTN, and macular degeneration who presented to the ED with SOB,  admitted for COPD exacerbation.  - PTA was recently started on levofloxacin and prednisone burst/taper per Pulioana THOMAS on 12/14     COPD exacerbation  Acute on chronic hypoxic respiratory failure  History of pulmonary nodules  History of sleep apnea (mild) and nocturnal O2 dependent (4lts by NC)  CXR on admission no significant infiltrates. Blood cultures 12/17 with no growth so far, influenza negative. CT chest showed no PE but moderate bronchial wall thickening, bronchiectasis, mucous  plugging as well as bilateral groundglass infiltrates and scattered consolidative changes in a bronchovascular distribution.  - Completed course of antibiotic treatment for CAP.  - xopenex/ipratropium nebs given tachycardia.   - Changed IV steroids to prednisone 60mg daily. Continue prednisone burst x 5 days or could do short taper.  - Pulmonology consulted. Greatly appreciate recommendations.   - Mucinex started   - Increase activity and incentive spirometry.    - Recommend outpatient pulmonary followup with Dr. Miller at [facility].      Elevated troponin, demand ischemia vs NSTEMI  No CAD history  HTN  - trops peaked to 3.725. Evaluated by cardiology; suggest likely demand ischemia in the setting of hypoxia related to COPD exacerbation but can't rule out CAD - coronary angio this afternoon.  - started on ASA 81 mg daily  - ECHO 12/19 noted as technically difficult study but with EF 60-65% and no significant wall motion abnormalities. pro BNP elevated at 3k.  - Amlodipine 5mg daily added for better control. titrate up as needed.  - Received lasix past 2 days. Dyazide held for cath today. Restart  if creat stable post-procedure.    Diet: NPO for Medical/Clinical Reasons Except for: Meds  NPO for Medical/Clinical Reasons Except for: Ice Chips, Meds    DVT Prophylaxis: Enoxaparin (Lovenox) SQ  Buenrostro Catheter: not present  Code Status: Full Code      Disposition Plan   Expected discharge: Tomorrow, recommended to prior living arrangement once angiogram Monday, clinical improvement. Home O2 and home nursing orders placed. Will need to clarify anti hypertensives with cardiology or adjust per BP post procedure. Other discharge orders placed.        The patient's care was discussed with the Bedside Nurse, Care Coordinator/, Patient and Patient's Family.    Brittany Jett MD  Hospitalist Service  [facility]    ______________________________________________________________________    Interval History   Patient's oxygen needs somewhat improved but still requiring continuous supplemental oxygen. Only on oxygen at night at home. Denies CP. No nausea. Tolerated diet. NPO today for procedure which is now scheduled for afternoon. Patient feeling very weak and tired today. Not comfortable going home late after procedure. Discussed dispo with home oxygen with patient, her daughter, and care coordinator to start dispo for tomorrow.    Data reviewed today: I reviewed all medications, new labs and imaging results over the last 24 hours. I personally reviewed no images or EKG's today.    Physical Exam   Vital Signs: Temp: 97.9  F (36.6  C) Temp src: Oral BP: (!) 159/77 Pulse: 92 Heart Rate: 92 Resp: 18 SpO2: 96 % O2 Device: Nasal cannula Oxygen Delivery: 2 LPM  Weight: 188 lbs 7.89 oz  Constitutional: awake, alert, cooperative, mild resp distress, and appears stated age  Respiratory: increased work of breathing,no significant wheezing  Cardiovascular: tachycardia  GI: No scars, normal bowel sounds, soft, non-distended, non-tender, no masses palpated, no hepatosplenomegally  Neuropsychiatric:  General: normal, calm and normal eye contact  Affect: normal    Data   Recent Labs   Lab 12/23/19  0748 12/22/19  1722 12/21/19  0646 12/20/19  1620 12/19/19  0626 12/18/19  1853 12/18/19  1541 12/18/19  1017  12/17/19  2349   WBC 7.4  --   --   --  6.4  --   --   --   --  8.5   HGB 13.5  --   --   --  12.5  --   --   --   --  13.3   MCV 91  --   --   --  92  --   --   --   --  91     --  219 220 189  --   --   --   --  204   INR  --   --   --   --  1.00  --   --   --   --   --      --   --   --  138  --   --   --   --  136   POTASSIUM 3.4 3.5  --   --  3.8  --   --   --    < > 3.4   CHLORIDE 98  --   --   --  104  --   --   --   --  99   CO2 42*  --   --   --  33*  --   --   --   --  32   BUN 29  --   --   --  21  --   --   --   --  17   CR 0.74  --  0.68 0.59 0.64  --   --   --   --  0.83   ANIONGAP <1*  --   --   --  1*  --   --   --   --  5   TANISHA 8.8  --   --   --  8.7  --   --   --   --  8.9   GLC 94  --   --   --  138*  --   --   --   --  161*   ALBUMIN  --   --   --   --  3.2*  --   --   --   --   --    PROTTOTAL  --   --   --   --  7.2  --   --   --   --   --    BILITOTAL  --   --   --   --  0.2  --   --   --   --   --    ALKPHOS  --   --   --   --  53  --   --   --   --   --    ALT  --   --   --   --  23  --   --   --   --   --    AST  --   --   --   --  34  --   --   --   --   --    TROPI  --   --   --   --   --  3.725* 1.716* 1.734*   < > 0.054*    < > = values in this interval not displayed.     No results found for this or any previous visit (from the past 24 hour(s)).  Medications     - MEDICATION INSTRUCTIONS -       sodium chloride 150 mL/hr at 12/23/19 0700       amLODIPine  5 mg Oral Daily     aspirin  81 mg Oral Daily     guaiFENesin  1,200 mg Oral BID     ipratropium  0.5 mg Nebulization Q6H     levalbuterol  1.25 mg Nebulization Q6H     predniSONE  60 mg Oral Daily     senna-docusate  1 tablet Oral BID    Or     senna-docusate  2 tablet Oral BID     sodium chloride (PF)  3 mL  Intracatheter Q8H

## 2019-12-23 NOTE — PROGRESS NOTES
sPatient has been assessed for Home Oxygen needs. Oxygen readings:    *Pulse oximetry (SpO2) = 90%on room air at rest while awake.    *SpO2 improved to 94*% on 2 liters/minute at rest.    *SpO2 = 79% on room air during activity/with exercise.    *SpO2 improved to 92% on 2 liters/minute  (within minutes upon return to chair).

## 2019-12-23 NOTE — PROGRESS NOTES
"BRIEF NUTRITION ASSESSMENT      REASON FOR ASSESSMENT:  Nena Sanders is a 79 year old female seen by Registered Dietitian for [facility]    NUTRITION HISTORY:  Unable to obtain hx upon 3 attempts, pt was with MD.    CURRENT DIET AND INTAKE:  Diet:  Regular --> NPO for angio today            Per flow sheets, has been eating 50%.    ANTHROPOMETRICS:  Height: 5' 7\"  Weight:  188 lbs 7.89 oz  Body mass index is 29.52 kg/m .   Weight Status: Overweight BMI 25-29.9  IBW:  61.4 kg +/- 10%  %IBW: 139%  Weight History:   Wt has been relatively stable.  Wt Readings from Last 10 Encounters:   12/23/19 85.5 kg (188 lb 7.9 oz)   01/26/19 86.2 kg (190 lb)   01/26/16 86.2 kg (190 lb)   12/16/15 86.6 kg (191 lb)       LABS:  Labs noted    MALNUTRITION:  Visual Nutrition Focused Physical Assessment (NFPA) completed. Patient does not meet two of the following criteria necessary for diagnosing malnutrition: significant weight loss, reduced intake, subcutaneous fat loss, muscle loss or fluid retention.     NUTRITION INTERVENTION:  Nutrition Diagnosis:  No nutrition diagnosis at this time.    Implementation:  Nutrition Education:  Per Provider order if indicated    FOLLOW UP/MONITORING:   Will re-evaluate in 7 - 10 days, or sooner, if re-consulted.          "

## 2019-12-23 NOTE — PRE-PROCEDURE
GENERAL PRE-PROCEDURE:   Procedure:  Coronary angiogram possible invention  Date/Time:  12/23/2019 1:43 PM    Verbal consent obtained?: Yes    Risks and benefits: Risks, benefits and alternatives were discussed    DC Plan: Appropriate discharge home plan in place for patients who are going home after procedure   Consent given by:  Patient  Patient states understanding of procedure being performed: Yes    Patient's understanding of procedure matches consent: Yes    Procedure consent matches procedure scheduled: Yes    Expected level of sedation:  Moderate  Appropriately NPO:  Yes  ASA Class:  Class 3- Severe systemic disease, definite functional limitations  Mallampati  :  Grade 2- soft palate, base of uvula, tonsillar pillars, and portion of posterior pharyngeal wall visible  Lungs:  Other (comment)  Lung exam comment:  Rare ronchi, otherwise clear  Heart:  Normal heart sounds and rate  History & Physical reviewed:  History and physical reviewed and no updates needed  Statement of review:  I have reviewed the lab findings, diagnostic data, medications, and the plan for sedation    I have examined the patient, reviewed the history, medications and pre procedural tests.She has sustained a NSTEMI in the setting of COPD exacerbation.  I have explained to the patient the risks of death, MI, stroke, hematoma, possible urgent bypass surgery for failed PCI, use of stents, thienopyridine agents, possible peripheral vascular complications, arrhythmia, the use of FFR in clinical decision-making and alternative of medical therapy alone in regards to left heart catheterization, left ventriculography, coronary angiography, and possible percutaneous coronary intervention. The patient voiced understanding and wishes to proceed. The patient has a good right radial pulse, normal ulnar pulse and a normal Jose's sign.

## 2019-12-23 NOTE — PLAN OF CARE
Pt off BIPAP all day, lungs with crackles in the bases, O2 sats low to mid 90's on 2L NC, IS encouraged, short of breath with activity, pt up with assist of one in room, tolerates diet, poor appetite, good urine output, passing flatus, denies pain, angio planned for Monday, K replaced per pre procedure orders, will be NPO after MN, pt given information booklet for angio procedure,

## 2019-12-23 NOTE — PROGRESS NOTES
[facility]  Cardiology Progress Note  Date of Service: 12/23/2019    Assessment & Plan    Nena Sanders is a 79 year old female with past medical history significant for COPD (oxygen dependant on 4L overnight), pulmonary nodules, sleep apnea, HTN and macular degeneration admitted on 12/17/2019 with shortness of breath and COPD exacerbation. She was started on levofloxacin and prednisone PTA by pulmonology.     Assessment:   1. COPD exacerbation with acute respiratory failure    Admission CXR showed no significant infiltrates     IV azithromycin and ceftriaxone    Nebulizer and prednisone    2. Elevated troponin. Demand ischemia vs NSTEMI    No personal history of CAD    Peak 3.7    Echocardiogram showed normal LVEF     BNP 3225. PTA Dyazide held prior to cath, but received 1 x IV Lasix 20 mg daily    3. Hypertension    [PTA: triamterene-hydrochlorothiazide (dyazide) 37.5-25 mg daily]    Dyazide help pre cath    Amlodipine 5 mg started (12/23)    Plan:  1. Coronary angiogram today    VI DEGROOT CNP  Pager:  [phone]   (7am - 5pm, M-F)    Interval History   Discussed in detail regarding risk and benefits of the coronary angiogram with the patient and daughter. Discussed with cath lab and patient will have radial approach with Dr. Anthony.     All risks and benefits for this procedure have been explained to this patient and accepted.  This includes but is not limited to death, heart attack, stroke, blood clots, cardiac or vascular perforation, bleeding including the need for blood transfusion and the risk thereof, allergic reaction to x-ray dye, arrhythmia necessitating cardioversion, contrast dye nephropathy (including risks of temporary or permanent dialysis).  We talked about intracoronary stenting and the risks thereof and bypass surgery.      No history of bleeding problems or current bleeding, and no scheduled surgeries or procedures in the next year. Patient understands and  wishes to proceed with it.    Physical Exam   Temp: 97.6  F (36.4  C) Temp src: Oral BP: (!) 147/77(nurse inform) Pulse: 97 Heart Rate: 89 Resp: 18 SpO2: 96 % O2 Device: Nasal cannula Oxygen Delivery: 2 LPM  Vitals:    12/21/19 0600 12/22/19 0500 12/23/19 0651   Weight: 86 kg (189 lb 9.5 oz) 86.1 kg (189 lb 13.1 oz) 85.5 kg (188 lb 7.9 oz)       Constitutional:   NAD   Skin:   Warm and dry   Head:   Nontraumatic   Neck:   no JVD   Lungs:   Bilateral rhochi   Cardiovascular:   regular rate and rhythm and normal S1 and S2   Abdomen:   Benign   Extremities and Back:   No edema   Neurological:   Grossly nonfocal       Medications     - MEDICATION INSTRUCTIONS -       sodium chloride 150 mL/hr at 12/23/19 0700       amLODIPine  5 mg Oral Daily     aspirin  81 mg Oral Daily     azithromycin  500 mg Intravenous Q24H     cefTRIAXone  1 g Intravenous Q24H     enoxaparin ANTICOAGULANT  40 mg Subcutaneous Q24H     guaiFENesin  1,200 mg Oral BID     ipratropium  0.5 mg Nebulization Q6H     levalbuterol  1.25 mg Nebulization Q6H     predniSONE  60 mg Oral Daily     senna-docusate  1 tablet Oral BID    Or     senna-docusate  2 tablet Oral BID     sodium chloride (PF)  3 mL Intracatheter Q8H       Data     Most Recent 3 CBC's:  Recent Labs   Lab Test 12/23/19  0748 12/21/19  0646 12/20/19  1620 12/19/19  0626 12/17/19  2349   WBC 7.4  --   --  6.4 8.5   HGB 13.5  --   --  12.5 13.3   MCV 91  --   --  92 91    219 220 189 204     Most Recent 3 BMP's:  Recent Labs   Lab Test 12/23/19  0748 12/22/19  1722 12/21/19  0646 12/20/19  1620 12/19/19  0626  12/17/19  2349     --   --   --  138  --  136   POTASSIUM 3.4 3.5  --   --  3.8   < > 3.4   CHLORIDE 98  --   --   --  104  --  99   CO2 42*  --   --   --  33*  --  32   BUN 29  --   --   --  21  --  17   CR 0.74  --  0.68 0.59 0.64  --  0.83   ANIONGAP <1*  --   --   --  1*  --  5   TANISHA 8.8  --   --   --  8.7  --  8.9   GLC 94  --   --   --  138*  --  161*    < > = values  in this interval not displayed.     Most Recent 3 Troponin's:  Recent Labs   Lab Test 12/18/19  1853 12/18/19  1541 12/18/19  1017   TROPI 3.725* 1.716* 1.734*     Most Recent 3 BNP's:  Recent Labs   Lab Test 12/21/19  0646   NTBNPI 3,235*     Most Recent D-dimer:  Recent Labs   Lab Test 12/20/19  1235   DD 0.8*     Most Recent Cholesterol Panel:  Recent Labs   Lab Test 12/19/19  0626   CHOL 181   LDL 73   HDL 98   TRIG 50

## 2019-12-23 NOTE — PROGRESS NOTES
I certify that this patient, Nena Sanders has been under my care and that I, or a nurse practitioner or physician's assistant working with me, had a face-to-face encounter that meets face-to-face encounter requirements with this patient on December 23, 2019.    Nena Sanders is now in a chronic stable state and continues to require supplemental oxygen due to continued oxygen desaturation.  This patient has been treated in part, or in whole for the following medical condition(s):  Chronic Heart Failure I50  Chronic Respiratory Failure with Hypoxia J93.11  COPD J44.9  Treatments tried and failed or ruled out to treat hypoxemia include cardiac workup, diuresis, antibiotics, COPD treatments. Pulmonology and Cardiology consults.   If portability is ordered, is the patient mobile within the home? Yes    Brittany Jett MD

## 2019-12-23 NOTE — PLAN OF CARE
A&OX4. Lungs diminished in bases and BROWN.  Loose, non productive cough.  NPO today for cath lab.  Oxygen sats 89-90 on RA at rest.  Oxygen 2 L sats low to mid 90s. Oxygen sats dropping to 79% on RA with activity, recovers quickly back to low 90s on 2 L NC.

## 2019-12-24 LAB
ANION GAP SERPL CALCULATED.3IONS-SCNC: <1 MMOL/L (ref 3–14)
BACTERIA SPEC CULT: NO GROWTH
BACTERIA SPEC CULT: NO GROWTH
BUN SERPL-MCNC: 26 MG/DL (ref 7–30)
CALCIUM SERPL-MCNC: 8.6 MG/DL (ref 8.5–10.1)
CHLORIDE SERPL-SCNC: 99 MMOL/L (ref 94–109)
CO2 SERPL-SCNC: 40 MMOL/L (ref 20–32)
CREAT SERPL-MCNC: 0.6 MG/DL (ref 0.52–1.04)
GFR SERPL CREATININE-BSD FRML MDRD: 87 ML/MIN/{1.73_M2}
GLUCOSE SERPL-MCNC: 94 MG/DL (ref 70–99)
Lab: NORMAL
Lab: NORMAL
POTASSIUM SERPL-SCNC: 4 MMOL/L (ref 3.4–5.3)
SODIUM SERPL-SCNC: 139 MMOL/L (ref 133–144)
SPECIMEN SOURCE: NORMAL
SPECIMEN SOURCE: NORMAL

## 2019-12-24 PROCEDURE — 12000000 ZZH R&B MED SURG/OB

## 2019-12-24 PROCEDURE — 80048 BASIC METABOLIC PNL TOTAL CA: CPT | Performed by: HOSPITALIST

## 2019-12-24 PROCEDURE — 99232 SBSQ HOSP IP/OBS MODERATE 35: CPT | Performed by: HOSPITALIST

## 2019-12-24 PROCEDURE — 25000132 ZZH RX MED GY IP 250 OP 250 PS 637: Mod: GY | Performed by: INTERNAL MEDICINE

## 2019-12-24 PROCEDURE — 40000275 ZZH STATISTIC RCP TIME EA 10 MIN

## 2019-12-24 PROCEDURE — 94640 AIRWAY INHALATION TREATMENT: CPT

## 2019-12-24 PROCEDURE — 94640 AIRWAY INHALATION TREATMENT: CPT | Mod: 76

## 2019-12-24 PROCEDURE — 25000132 ZZH RX MED GY IP 250 OP 250 PS 637: Mod: GY | Performed by: HOSPITALIST

## 2019-12-24 PROCEDURE — 36415 COLL VENOUS BLD VENIPUNCTURE: CPT | Performed by: HOSPITALIST

## 2019-12-24 PROCEDURE — 25000131 ZZH RX MED GY IP 250 OP 636 PS 637: Mod: GY | Performed by: HOSPITALIST

## 2019-12-24 PROCEDURE — 25000125 ZZHC RX 250: Performed by: INTERNAL MEDICINE

## 2019-12-24 RX ORDER — TRIAMTERENE AND HYDROCHLOROTHIAZIDE 37.5; 25 MG/1; MG/1
1 CAPSULE ORAL DAILY
Status: DISCONTINUED | OUTPATIENT
Start: 2019-12-25 | End: 2019-12-24 | Stop reason: CLARIF

## 2019-12-24 RX ORDER — TRIAMTERENE/HYDROCHLOROTHIAZID 37.5-25 MG
1 TABLET ORAL DAILY
Status: DISCONTINUED | OUTPATIENT
Start: 2019-12-25 | End: 2019-12-27 | Stop reason: HOSPADM

## 2019-12-24 RX ADMIN — IPRATROPIUM BROMIDE 0.5 MG: 0.5 SOLUTION RESPIRATORY (INHALATION) at 06:34

## 2019-12-24 RX ADMIN — PREDNISONE 60 MG: 20 TABLET ORAL at 11:16

## 2019-12-24 RX ADMIN — LEVALBUTEROL HYDROCHLORIDE 1.25 MG: 1.25 SOLUTION, CONCENTRATE RESPIRATORY (INHALATION) at 13:29

## 2019-12-24 RX ADMIN — ASPIRIN 81 MG: 81 TABLET, COATED ORAL at 11:16

## 2019-12-24 RX ADMIN — LEVALBUTEROL HYDROCHLORIDE 1.25 MG: 1.25 SOLUTION, CONCENTRATE RESPIRATORY (INHALATION) at 06:34

## 2019-12-24 RX ADMIN — LEVALBUTEROL HYDROCHLORIDE 1.25 MG: 1.25 SOLUTION, CONCENTRATE RESPIRATORY (INHALATION) at 19:47

## 2019-12-24 RX ADMIN — GUAIFENESIN AND CODEINE PHOSPHATE 5 ML: 100; 10 SOLUTION ORAL at 16:45

## 2019-12-24 RX ADMIN — IPRATROPIUM BROMIDE 0.5 MG: 0.5 SOLUTION RESPIRATORY (INHALATION) at 13:31

## 2019-12-24 RX ADMIN — LORAZEPAM 0.25 MG: 0.5 TABLET ORAL at 20:53

## 2019-12-24 RX ADMIN — GUAIFENESIN 1200 MG: 600 TABLET, EXTENDED RELEASE ORAL at 20:53

## 2019-12-24 RX ADMIN — GUAIFENESIN 1200 MG: 600 TABLET, EXTENDED RELEASE ORAL at 11:16

## 2019-12-24 RX ADMIN — AMLODIPINE BESYLATE 5 MG: 5 TABLET ORAL at 11:15

## 2019-12-24 ASSESSMENT — ACTIVITIES OF DAILY LIVING (ADL)
ADLS_ACUITY_SCORE: 19

## 2019-12-24 ASSESSMENT — MIFFLIN-ST. JEOR: SCORE: 1371.74

## 2019-12-24 NOTE — PROGRESS NOTES
[facility]  Hospitalist Progress Note        Titus Rosas MD   12/24/2019        Interval History:      - had cardiac cath done yesterday; reports feeling weak and doesnot feel she is ready to discharge today         Assessment and Plan:        Nena Sanders is a 79 year old female with history of COPD, O2 dependent at night (on 4L), pulmonary nodules, sleep apnea, HTN, and macular degeneration who presented to the ED with SOB,  admitted for COPD exacerbation.  - PTA was recently started on levofloxacin and prednisone burst/taper per Pulioana THOMAS on 12/14     # COPD exacerbation  # Acute on chronic hypoxic respiratory failure  # History of pulmonary nodules  # History of sleep apnea (mild) and nocturnal O2 dependent (4lts by NC)  - CXR on admission no significant infiltrates. Blood cultures 12/17 with no growth so far, influenza negative. CT chest showed no PE but moderate bronchial wall thickening, bronchiectasis, mucous plugging as well as bilateral groundglass infiltrates and scattered consolidative changes in a bronchovascular distribution.  - Completed course of antibiotic treatment for CAP.  - xopenex/ipratropium nebs given tachycardia.   - Changed IV steroids to prednisone 60mg daily. Continue prednisone burst x 5 days or could do short taper on discharge (does have steroid taper prescription from PTA which she had just started).  - Pulmonology consulted. Greatly appreciate recommendations; recommend outpatient pulmonary followup with Dr. Miller at [facility].   - assessed for home oxygen; needs 2 lts NC during day as well; SW arranged for portable tank     # Elevated troponin, demand ischemia vs NSTEMI  # No CAD history  # HTN  # Cardiac cath 12/23--no significant CAD  - trops peaked to 3.725. Evaluated by cardiology; suggest likely demand ischemia in the setting of hypoxia related to COPD exacerbation but can't rule out CAD   - started on ASA 81 mg daily  - ECHO  "12/19 noted as technically difficult study but with EF 60-65% and no significant wall motion abnormalities. pro BNP elevated at 3k.  - Amlodipine 5mg daily added for better control. titrate up as needed.  - Received lasix past 2 days and PTA Dyazide was held for cath   - s/p cardiac cath 12/23--noted no significant CAD; cardiology signed off  - will resume PTA dyazide     DVT Prophylaxis: Enoxaparin (Lovenox) SQ  Code Status: Full Code          Disposition Plan     Expected discharge:  medically seems stable for discharge but reports feeling very weak and did not feel comfortable discharge home today ; likely in am with continued home O2                     Physical Exam:      Blood pressure 135/64, pulse 102, temperature 97.5  F (36.4  C), resp. rate 18, height 1.702 m (5' 7\"), weight 85.9 kg (189 lb 6.4 oz), SpO2 94 %.  Vitals:    12/22/19 0500 12/23/19 0651 12/24/19 0500   Weight: 86.1 kg (189 lb 13.1 oz) 85.5 kg (188 lb 7.9 oz) 85.9 kg (189 lb 6.4 oz)     Vital Signs with Ranges  Temp:  [97.5  F (36.4  C)-98.1  F (36.7  C)] 97.5  F (36.4  C)  Pulse:  [] 102  Heart Rate:  [74-92] 74  Resp:  [18] 18  BP: (135-186)/() 135/64  SpO2:  [94 %-98 %] 94 %  I/O's Last 24 hours  I/O last 3 completed shifts:  In: 360 [P.O.:360]  Out: 2000 [Urine:2000]    Constitutional: Alert, awake and oriented X 3; lying comfortably in bed in no apparent distress; looks fatigued; speaking in full sentences   HEENT: Pupils equal and reactive to light and accomodation, EOMI intact; neck supple no raised JVD or rigidity    Oral cavity: Moist mucosa   Cardiovascular: Normal s1 s2, regular rate and rhythm, no murmur   Lungs: B/l clear to auscultation, no wheezes or crepitations   Abdomen: Soft, nt, nd, no guarding, rigidity or rebound; BS +   LE : No edema   Musculoskeletal: Power 5/5 in all extremities   Neuro: No focal neurological deficits noted, CN II to XII grossly intact   Psychiatry: normal mood and affect                " Medications:          amLODIPine  5 mg Oral Daily     aspirin  81 mg Oral Daily     guaiFENesin  1,200 mg Oral BID     ipratropium  0.5 mg Nebulization Q6H     levalbuterol  1.25 mg Nebulization Q6H     predniSONE  60 mg Oral Daily     senna-docusate  1 tablet Oral BID    Or     senna-docusate  2 tablet Oral BID     sodium chloride (PF)  3 mL Intracatheter Q8H     PRN Meds: acetaminophen, albuterol, guaiFENesin-codeine, HOLD MEDICATION, hydrALAZINE, LORazepam, magnesium sulfate, melatonin, naloxone, ondansetron **OR** ondansetron, - MEDICATION INSTRUCTIONS -, sodium chloride (PF)         Data:      All new lab and imaging data was reviewed.   Recent Labs   Lab Test 12/23/19  0748 12/21/19  0646 12/20/19  1620 12/19/19  0626 12/17/19  2349   WBC 7.4  --   --  6.4 8.5   HGB 13.5  --   --  12.5 13.3   MCV 91  --   --  92 91    219 220 189 204   INR  --   --   --  1.00  --       Recent Labs   Lab Test 12/24/19  0622 12/23/19  0748 12/22/19  1722 12/21/19  0646  12/19/19  0626    140  --   --   --  138   POTASSIUM 4.0 3.4 3.5  --   --  3.8   CHLORIDE 99 98  --   --   --  104   CO2 40* 42*  --   --   --  33*   BUN 26 29  --   --   --  21   CR 0.60 0.74  --  0.68   < > 0.64   ANIONGAP <1* <1*  --   --   --  1*   TANISHA 8.6 8.8  --   --   --  8.7   GLC 94 94  --   --   --  138*    < > = values in this interval not displayed.     Recent Labs   Lab Test 12/18/19  1853 12/18/19  1541 12/18/19  1017   TROPI 3.725* 1.716* 1.734*

## 2019-12-24 NOTE — CONSULTS
Care Transition Initial Assessment - RN        Met with: Patient and Family.  DATA   Principal Problem:    NSTEMI (non-ST elevated myocardial infarction) (H)  Active Problems:    COPD exacerbation (H)       Cognitive Status: awake and alert.        Contact information and PCP information verified: Yes  Lives With: alone                     Insurance concerns: No Insurance issues identified  ASSESSMENT  Patient currently receives the following services:  Pt currently lives independently in her own condo and uses nocturnal oxygen through [facility]       Identified issues/concerns regarding health management:  Pt was admitted 12/18 for COPD exacerbation.  The pt will now be on continuous oxygen.  See previous note for oxygen.   Met w/ patient re: home care orders.  Offered patient a choice of home care agencies. Pt/family was given the Medicare Compare list for Home Care, with associated star ratings to assist with choice for referrals/discharge planning; Yes.    Education was given to pt/family that star ratings are updated/maintained by Medicare and can be reviewed by visiting www.medicare.gov; Yes.  Pt would like to use [facility]. Pt understands they must be homebound. Pt informed of the plan and in agreement with the plan. [facility] They are unable to process the homecare due to the holiday and request we call back tomorrow.     PLAN  Financial costs for the patient include copays .  Patient given options and choices for discharge: yes .  Patient/family is agreeable to the plan?  Yes:   Patient anticipates discharging to Home with Home care RN/PT/OT .       Patient anticipates needs for home equipment: Yes; Walker  Transportation/person available to transport on day of discharge  is Vonnie and have they been notified/set up TBD  Plan/Disposition: Home   Appointments: Pt to arrange own follow-up    Care  (CTS) will continue to follow as  needed.    Brittney Seymour RN BAN  Inpatient Care Coordination  [address] [address]  [email or web address]   Office: [phone]  Fax: [phone]  Gender pronouns: she/her/hers  Employed by [employer]

## 2019-12-24 NOTE — PLAN OF CARE
Pt A&Ox4, VSS on 4L O2 (baseline). Angio without intervention done 12/23, denies pain. Up Ax1. Completed abx course for CAP. Anxiety with PRN ativan. HTN with scheduled amlodipine and PRN hydralazine. Cards following. Tele NSR. Discharge TBD.

## 2019-12-24 NOTE — PROGRESS NOTES
"PULMONOLOGY PROGRESS NOTE  Date of service: 2019  [facility]    CC/Reason for Visit: COPD exacerbation.    SUBJECTIVE      HPI: Events reviewed since last seen. Cardiac cath results noted. Stable night. No new respiratory problems. States breathing is about the same today.    ROS: A Problem Pertinent review of systems was negative except for items noted in HPI.    Past Medical, Family, and Social/Substance History has been reviewed: No interval changes.    OBJECTIVE   /64   Pulse 102   Temp 97.5  F (36.4  C)   Resp 18   Ht 1.702 m (5' 7\")   Wt 85.9 kg (189 lb 6.4 oz)   SpO2 98%   BMI 29.66 kg/m   4 L/min     Temp (24hrs), Av.7  F (36.5  C), Min:97.3  F (36.3  C), Max:98  F (36.7  C)       Intake/Output Summary (Last 24 hours) at 2019 1025  Last data filed at 2019 2130  Gross per 24 hour   Intake 1080 ml   Output 1640 ml   Net -560 ml     Patient Vitals for the past 96 hrs:   Weight   19 0500 85.9 kg (189 lb 6.4 oz)   19 0651 85.5 kg (188 lb 7.9 oz)   19 0500 86.1 kg (189 lb 13.1 oz)   19 0600 86 kg (189 lb 9.5 oz)       CONSTITUTIONAL/GENERAL APPEARANCE: Alert. No Apparent Distress.  PSYCHIATRIC: Pleasant and appropriate mood and affect. Oriented x 3.  EARS, NOSE,THROAT,MOUTH: External ears and nose overall normal. Normal oral mucosa.   NECK: Neck appearance normal. No neck masses and the thyroid is not enlarged.   RESPIRATORY: Non-labored effort. Decreased BS, prolonged exp phase with wheezes.  CARDIOVASCULAR: S1, S2, regular rate and rhythm.      LABORATORY ASSESSMENT    No lab results found in last 7 days.  Recent Labs   Lab 19  0748 19  0646  19  0626   WBC 7.4  --   --  6.4   RBC 4.80  --   --  4.30   HGB 13.5  --   --  12.5   HCT 43.8  --   --  39.4   MCV 91  --   --  92   MCH 28.1  --   --  29.1   MCHC 30.8*  --   --  31.7   RDW 14.7  --   --  15.3*    219   < > 189    < > = values in this interval not " displayed.     Recent Labs   Lab 12/24/19  0622 12/23/19  0748 12/22/19  1722 12/21/19  0646  12/19/19  0626    140  --   --   --  138   POTASSIUM 4.0 3.4 3.5  --   --  3.8   CHLORIDE 99 98  --   --   --  104   CO2 40* 42*  --   --   --  33*   ANIONGAP <1* <1*  --   --   --  1*   BUN 26 29  --   --   --  21   CR 0.60 0.74  --  0.68   < > 0.64   GFRESTIMATED 87 78  --  83   < > 85   GFRESTBLACK >90 >90  --  >90   < > >90   TANISHA 8.6 8.8  --   --   --  8.7    < > = values in this interval not displayed.     No lab results found in last 7 days.  Recent Labs   Lab 12/19/19  0626   INR 1.00       Additional labs and/or comments:    IMAGING      CT Chest 12/20 -  IMPRESSION:   1. No pulmonary embolism demonstrated.  2. No thoracic aortic dissection or aneurysm.   3. Moderate bronchial wall thickening, bronchiectasis, mucous  plugging.  4. Bilateral groundglass infiltrates and scattered consolidative  changes in a bronchovascular distribution.    CXR 12/17 -  IMPRESSION: No evidence for acute focal alveolar infiltrate or consolidation. Normal heart size. Normal pulmonary vascularity. Stable opacity involving the left lung base representing a prominent nipple shadow. No evidence for pneumothorax. Minimal  degenerative changes in the left acromioclavicular joint and spine.    PFT & OTHER TESTING     Echo 12/19 -  Interpretation Summary  Technically difficult study due to poor endocardial delineation.  Left ventricular systolic function is normal.  The visual ejection fraction is estimated at 60-65%.  Wall motion probably normal on limited views.  The right ventricle is normal in structure, function and size.  No significant valve dysfunction.  Unable to estimate PA systolic pressure.  Dilation of the inferior vena cava is present with normal respiratory variation in diameter.  There is no pericardial effusion.  Compared to prior study dated 12/17/2015, no significant change.    ASSESSMENT / PLAN      Principal Problem:     NSTEMI (non-ST elevated myocardial infarction) (H)  Active Problems:    COPD exacerbation (H)      ASSESSMENT: 79-year-old female with a history of COPD on nocturnal O2, untreated obstructive sleep apnea, benign pulmonary nodules (CT scan of the chest 9/27/2019 showed no change in the bilateral pulmonary nodules versus 6/21/2012), mild bronchiectasis admitted with a 3-day history of worsening shortness of breath and cough productive of greenish sputum.  Patient is maintained on Advair 250/50 and DuoNebs/Albuterol prn.  She was recently treated with a course of Levaquin and prednisone by her Pulmonologist Dr. Yris Miller at [facility]. Chest x-ray on admission showed no acute infiltrates.  CT scan of the chest 12/20 was negative for PE and demonstrated moderate bronchial wall thickening, bronchiectasis and mucous plugging. Troponins elevated.  COPD exacerbation likely secondary to bronchitis. Patient is slowly improving on current rx. Respiratory status stable on low flow O2.    Diagnoses  Abnl CT/CXR  R91.8  Bronchiectasis J47.9  Bronchitis acute J20.9  COPD exacerb J44.1  Hypoxemia  R09.02  Sleep apnea obstr G47.33  SOB   R06.02    PLAN:  1. Adjust oxygen, keep SaO2 > 90%  2. Bronchodilators - Xopenex + Atrovent nebs  3. Steroids - Prednisone  4. Mucolytics - Mucinex.  5. Increase activity and IS.  6. Recommend outpatient Pulmonary follow-up with Dr. Miller at [facility].    No further suggestions at this time. Dr. Sunshine will see pt in follow-up on Thursday. Please call if needed over Xmas.      Jenaro Alvarez MD    [facility]  [phone] (pager)  [phone] (office)

## 2019-12-24 NOTE — PLAN OF CARE
A&OX4. Lungs diminished in bases and BROWN.  Loose, non productive cough.    Oxygen sats 87% on RA at rest.  Oxygen 2 L sats low to mid 90s. Oxygen sats dropping to 79% on RA with activity, recovers quickly back to low 90s on 2 L N, Home O2 ordered and being delivered pr SW. Needs encouragement to get up and walk, sat in chair x2 today and walker SBA to BR. No pain. Plan is to transfer to  (private room request) and leave in am. OT/PT to see.

## 2019-12-24 NOTE — PLAN OF CARE
Pt transferred from [facility] after having cor-angio today with no intervention. Right radial site is stable, ecchymotic, pulse 2+, she is off bedrest. VS, bp has been elevated but better with ativan, pt has high level of anxiety, hydralazine x 1 for BP >180. HR tachy, but better now as well. She is on 2 LNC, has been weaned off BiPAP and states has not used in the last 2 nights.  SR.

## 2019-12-24 NOTE — PROGRESS NOTES
Care Coordination:    -Pt currently have nocturnal oxygen. She gets her oxygen through [facility].  Reached out the them at [phone] and spoke to Radha in customer service. Requested tank for transport for discharge today 12/24.  Faxed new orders to [phone]. They will deliver a portable tank in the next couple of hours.       Brittney Seymour RN, BAN  Inpatient Care Coordination  [address] [address]  [email or web address]   Office: [phone]  Fax: [phone]  Gender pronouns: she/her/hers  Employed by [facility]

## 2019-12-25 PROCEDURE — 25000125 ZZHC RX 250: Performed by: INTERNAL MEDICINE

## 2019-12-25 PROCEDURE — 94640 AIRWAY INHALATION TREATMENT: CPT

## 2019-12-25 PROCEDURE — 99232 SBSQ HOSP IP/OBS MODERATE 35: CPT | Performed by: INTERNAL MEDICINE

## 2019-12-25 PROCEDURE — 25000132 ZZH RX MED GY IP 250 OP 250 PS 637: Mod: GY | Performed by: INTERNAL MEDICINE

## 2019-12-25 PROCEDURE — 25000125 ZZHC RX 250: Performed by: HOSPITALIST

## 2019-12-25 PROCEDURE — 12000000 ZZH R&B MED SURG/OB

## 2019-12-25 PROCEDURE — 25000132 ZZH RX MED GY IP 250 OP 250 PS 637: Mod: GY | Performed by: HOSPITALIST

## 2019-12-25 PROCEDURE — 94640 AIRWAY INHALATION TREATMENT: CPT | Mod: 76

## 2019-12-25 PROCEDURE — 40000275 ZZH STATISTIC RCP TIME EA 10 MIN

## 2019-12-25 PROCEDURE — 25000131 ZZH RX MED GY IP 250 OP 636 PS 637: Mod: GY | Performed by: HOSPITALIST

## 2019-12-25 RX ORDER — AMLODIPINE BESYLATE 2.5 MG/1
2.5 TABLET ORAL DAILY
Status: COMPLETED | OUTPATIENT
Start: 2019-12-25 | End: 2019-12-25

## 2019-12-25 RX ORDER — HYDRALAZINE HYDROCHLORIDE 20 MG/ML
10 INJECTION INTRAMUSCULAR; INTRAVENOUS EVERY 4 HOURS PRN
Status: DISCONTINUED | OUTPATIENT
Start: 2019-12-25 | End: 2019-12-27 | Stop reason: HOSPADM

## 2019-12-25 RX ADMIN — SENNOSIDES AND DOCUSATE SODIUM 2 TABLET: 8.6; 5 TABLET ORAL at 20:09

## 2019-12-25 RX ADMIN — IPRATROPIUM BROMIDE 0.5 MG: 0.5 SOLUTION RESPIRATORY (INHALATION) at 20:50

## 2019-12-25 RX ADMIN — AMLODIPINE BESYLATE 2.5 MG: 2.5 TABLET ORAL at 15:02

## 2019-12-25 RX ADMIN — AMLODIPINE BESYLATE 5 MG: 5 TABLET ORAL at 08:03

## 2019-12-25 RX ADMIN — ASPIRIN 81 MG: 81 TABLET, COATED ORAL at 08:03

## 2019-12-25 RX ADMIN — PREDNISONE 60 MG: 20 TABLET ORAL at 08:02

## 2019-12-25 RX ADMIN — IPRATROPIUM BROMIDE 0.5 MG: 0.5 SOLUTION RESPIRATORY (INHALATION) at 13:28

## 2019-12-25 RX ADMIN — GUAIFENESIN AND CODEINE PHOSPHATE 5 ML: 100; 10 SOLUTION ORAL at 22:02

## 2019-12-25 RX ADMIN — LORAZEPAM 0.25 MG: 0.5 TABLET ORAL at 22:02

## 2019-12-25 RX ADMIN — ALBUTEROL SULFATE 2.5 MG: 2.5 SOLUTION RESPIRATORY (INHALATION) at 13:25

## 2019-12-25 RX ADMIN — SENNOSIDES AND DOCUSATE SODIUM 2 TABLET: 8.6; 5 TABLET ORAL at 08:03

## 2019-12-25 RX ADMIN — LORAZEPAM 0.25 MG: 0.5 TABLET ORAL at 12:36

## 2019-12-25 RX ADMIN — GUAIFENESIN 1200 MG: 600 TABLET, EXTENDED RELEASE ORAL at 20:09

## 2019-12-25 RX ADMIN — LEVALBUTEROL HYDROCHLORIDE 1.25 MG: 1.25 SOLUTION, CONCENTRATE RESPIRATORY (INHALATION) at 20:50

## 2019-12-25 RX ADMIN — GUAIFENESIN 1200 MG: 600 TABLET, EXTENDED RELEASE ORAL at 08:02

## 2019-12-25 RX ADMIN — TRIAMTERENE AND HYDROCHLOROTHIAZIDE 1 TABLET: 37.5; 25 TABLET ORAL at 08:03

## 2019-12-25 ASSESSMENT — ACTIVITIES OF DAILY LIVING (ADL)
ADLS_ACUITY_SCORE: 19

## 2019-12-25 ASSESSMENT — MIFFLIN-ST. JEOR: SCORE: 1373.56

## 2019-12-25 NOTE — PLAN OF CARE
[facility] around 1900hrs. A/Ox4. VSS ex tachy (90-100s), on 2L O2 while awake and 3-4L at night. Denies pain. LS diminished w/ some crackles, receiving scheduled nebs. Congested/productive cough, mucinex given x1. Up SBA/assist x1 w/ GB/walker. Plans on potentially discharging to home tomorrow w/ O2: portable tank + walker in pt's room.

## 2019-12-25 NOTE — PLAN OF CARE
PT Note 12/25/2019 1:23 PM    PT attempted at scheduled time. Pt with SOB and is waiting on nebulizer treatment. PT will reschedule for tomorrow. Pt and daughter in agreement.

## 2019-12-25 NOTE — PLAN OF CARE
VSS. 2L O2 via NC while awake, 3-4L while sleeping. Denies pain. LS diminished, scheduled nebs ordered. Intermittent cough. Ambulating SBA with walker/GB. +BS, +gas. Voiding. Plan for possible discharge home today with O2 (portable tank & walker in pt's room).

## 2019-12-25 NOTE — PROGRESS NOTES
[facility]  Hospitalist Progress Note        Erica Aguilar MD  12/25/2019        Interval History:      Patient was very anxious this afternoon  Got short of breath  She missed her nebulizer this morning as respiratory was busy  When she got the nebulizer treatment and lorazepam she settled down and breathing is started to improve  I went back to see her and she was feeling much better  She was coughing up phlegm  Secretions were getting loose         Assessment and Plan:      79 year old female with history of COPD, O2 dependent at night (on 4L), pulmonary nodules, sleep apnea, HTN, and macular degeneration who presented to the ED with SOB,  admitted for COPD exacerbation.  - PTA was recently started on levofloxacin and prednisone burst/taper per Pulioana THOMAS on 12/14     # COPD exacerbation  # Acute on chronic hypoxic respiratory failure  # History of pulmonary nodules  # History of sleep apnea (mild) and nocturnal O2 dependent (4lts by NC)  - CXR on admission no significant infiltrates. Blood cultures 12/17 with no growth so far, influenza negative. CT chest showed no PE but moderate bronchial wall thickening, bronchiectasis, mucous plugging as well as bilateral groundglass infiltrates and scattered consolidative changes in a bronchovascular distribution.  - Completed course of antibiotic treatment for CAP.  - xopenex/ipratropium nebs given tachycardia.   - Changed IV steroids to prednisone 60mg daily. Continue prednisone burst x 5 days or could do short taper on discharge (does have steroid taper prescription from PTA which she had just started).  - Pulmonology consulted. Greatly appreciate recommendations; recommend outpatient pulmonary followup with Dr. Miller at [facility].   - assessed for home oxygen; needs 2 lts NC during day as well; SW arranged for portable tank     # Elevated troponin, demand ischemia vs NSTEMI  # No CAD history  # HTN  # Cardiac cath 12/23--no  "significant CAD  - trops peaked to 3.725. Evaluated by cardiology; suggest likely demand ischemia in the setting of hypoxia related to COPD exacerbation but can't rule out CAD   - started on ASA 81 mg daily  - ECHO 12/19 noted as technically difficult study but with EF 60-65% and no significant wall motion abnormalities. pro BNP elevated at 3k.  - Amlodipine 5mg daily added for better control. titrate up as needed.  - Received lasix past 2 days and PTA Dyazide was held for cath but resumed again on 12/24  - s/p cardiac cath 12/23--noted no significant CAD; cardiology signed off  Due to elevated blood pressure which could be due to her anxiety and high-dose steroids we gave single dose of amlodipine 2.5  She is already on 5 mg of amlodipine  PRN hydralazine is ordered         DVT Prophylaxis:   Enoxaparin (Lovenox) subcutaneous    Code Status:   -Full Code          Disposition Plan   She could not be discharged today as her breathing got worse this afternoon  She was very anxious but now Now she is improving  If she continues to feel better then she can be discharged tomorrow      Her care was discussed with the patient her daughter who was near bedside and her RN                   Physical Exam:      Heart Rate: 99, Blood pressure (!) 162/78, pulse 93, temperature 97  F (36.1  C), temperature source Oral, resp. rate 18, height 1.702 m (5' 7\"), weight 86.1 kg (189 lb 12.8 oz), SpO2 95 %.  Vitals:    12/23/19 0651 12/24/19 0500 12/25/19 0640   Weight: 85.5 kg (188 lb 7.9 oz) 85.9 kg (189 lb 6.4 oz) 86.1 kg (189 lb 12.8 oz)     Vital Signs with Ranges  Temp:  [96.7  F (35.9  C)-98  F (36.7  C)] 97  F (36.1  C)  Pulse:  [90-96] 93  Heart Rate:  [97-99] 99  Resp:  [18] 18  BP: (151-184)/(67-78) 162/78  SpO2:  [93 %-97 %] 95 %  I/O's Last 24 hours  I/O last 3 completed shifts:  In: 360 [P.O.:360]  Out: -     Constitutional: Alert awake oriented     Lungs:  Clear to auscultation with no audible wheezes or rhonchi   "   Cardiovascular: S1 and S2 no S3      Abdomen: Abdomen soft no guarding ,no rigidity, bowel sounds are positive     Skin:    Other:           Medications:          amLODIPine  5 mg Oral Daily     aspirin  81 mg Oral Daily     guaiFENesin  1,200 mg Oral BID     ipratropium  0.5 mg Nebulization Q6H     levalbuterol  1.25 mg Nebulization Q6H     predniSONE  60 mg Oral Daily     senna-docusate  1 tablet Oral BID    Or     senna-docusate  2 tablet Oral BID     sodium chloride (PF)  3 mL Intracatheter Q8H     triamterene-HCTZ  1 tablet Oral Daily     PRN Meds: acetaminophen, albuterol, guaiFENesin-codeine, HOLD MEDICATION, hydrALAZINE, LORazepam, magnesium sulfate, melatonin, naloxone, ondansetron **OR** ondansetron, - MEDICATION INSTRUCTIONS -, sodium chloride (PF)         Data:      All new lab and imaging data was reviewed.   Recent Labs   Lab Test 12/23/19  0748 12/21/19  0646 12/20/19  1620 12/19/19  0626 12/17/19  2349   WBC 7.4  --   --  6.4 8.5   HGB 13.5  --   --  12.5 13.3   MCV 91  --   --  92 91    219 220 189 204   INR  --   --   --  1.00  --       Recent Labs   Lab Test 12/24/19  0622 12/23/19  0748 12/22/19  1722 12/21/19  0646  12/19/19  0626    140  --   --   --  138   POTASSIUM 4.0 3.4 3.5  --   --  3.8   CHLORIDE 99 98  --   --   --  104   CO2 40* 42*  --   --   --  33*   BUN 26 29  --   --   --  21   CR 0.60 0.74  --  0.68   < > 0.64   ANIONGAP <1* <1*  --   --   --  1*   TANISHA 8.6 8.8  --   --   --  8.7   GLC 94 94  --   --   --  138*    < > = values in this interval not displayed.     Recent Labs   Lab Test 12/18/19  1853 12/18/19  1541 12/18/19  1017   TROPI 3.725* 1.716* 1.734*

## 2019-12-25 NOTE — PROGRESS NOTES
Nursing note  Pt a/o x 4,but anxious. up with sba/walker to the BR/BSC voiding fine. Pt c/o SOB/BROWN, on 2 liters of 02 via NC. Pt denies any dizziness or lightheadedness. LS fine crackles and diminished. Pt had a shower this evening. Denies any pain. Pt frequently coughing(productive). Prn Robitussin AC given at 1645. Pt had pulmonology consult today. Pt was transferred safely to station 88 at 1900 with all her belongings. Report called to Jenny BOWEN. Pt accompanied by her daughter.

## 2019-12-25 NOTE — PROGRESS NOTES
MK  I: SW attempted to contact [facility] to arrange HC services for patient at discharge. [facility] is not accepting referrals today. SW met with patient to discuss this and the possibility of sending to another Home care agency that would be able to accept referrals today. Patient was agreeable to [facility]. Ref sent to [facility] for RN/PT/OT services. Patient has portable O2 tank for transport home and patient's daughter will plan to transport when discharged.    P: SW will continue to follow and assist as needed.    Namrata Christiansen, RADHA, LGSW  [phone]  [facility]

## 2019-12-26 ENCOUNTER — APPOINTMENT (OUTPATIENT)
Dept: PHYSICAL THERAPY | Facility: CLINIC | Age: 79
DRG: 189 | End: 2019-12-26
Payer: MEDICARE

## 2019-12-26 PROCEDURE — 94640 AIRWAY INHALATION TREATMENT: CPT

## 2019-12-26 PROCEDURE — 12000000 ZZH R&B MED SURG/OB

## 2019-12-26 PROCEDURE — 99232 SBSQ HOSP IP/OBS MODERATE 35: CPT | Performed by: HOSPITALIST

## 2019-12-26 PROCEDURE — 25000125 ZZHC RX 250: Performed by: INTERNAL MEDICINE

## 2019-12-26 PROCEDURE — 97530 THERAPEUTIC ACTIVITIES: CPT | Mod: GP

## 2019-12-26 PROCEDURE — 25000132 ZZH RX MED GY IP 250 OP 250 PS 637: Mod: GY | Performed by: HOSPITALIST

## 2019-12-26 PROCEDURE — 97161 PT EVAL LOW COMPLEX 20 MIN: CPT | Mod: GP

## 2019-12-26 PROCEDURE — 97116 GAIT TRAINING THERAPY: CPT | Mod: GP

## 2019-12-26 PROCEDURE — 25000132 ZZH RX MED GY IP 250 OP 250 PS 637: Mod: GY | Performed by: INTERNAL MEDICINE

## 2019-12-26 PROCEDURE — 25000131 ZZH RX MED GY IP 250 OP 636 PS 637: Mod: GY | Performed by: HOSPITALIST

## 2019-12-26 PROCEDURE — 40000275 ZZH STATISTIC RCP TIME EA 10 MIN

## 2019-12-26 PROCEDURE — 94640 AIRWAY INHALATION TREATMENT: CPT | Mod: 76

## 2019-12-26 RX ORDER — AMLODIPINE BESYLATE 10 MG/1
10 TABLET ORAL DAILY
Status: DISCONTINUED | OUTPATIENT
Start: 2019-12-27 | End: 2019-12-27 | Stop reason: HOSPADM

## 2019-12-26 RX ADMIN — SENNOSIDES AND DOCUSATE SODIUM 2 TABLET: 8.6; 5 TABLET ORAL at 08:46

## 2019-12-26 RX ADMIN — IPRATROPIUM BROMIDE 0.5 MG: 0.5 SOLUTION RESPIRATORY (INHALATION) at 13:27

## 2019-12-26 RX ADMIN — AMLODIPINE BESYLATE 5 MG: 5 TABLET ORAL at 08:46

## 2019-12-26 RX ADMIN — LEVALBUTEROL HYDROCHLORIDE 1.25 MG: 1.25 SOLUTION, CONCENTRATE RESPIRATORY (INHALATION) at 07:38

## 2019-12-26 RX ADMIN — IPRATROPIUM BROMIDE 0.5 MG: 0.5 SOLUTION RESPIRATORY (INHALATION) at 19:30

## 2019-12-26 RX ADMIN — LEVALBUTEROL HYDROCHLORIDE 1.25 MG: 1.25 SOLUTION, CONCENTRATE RESPIRATORY (INHALATION) at 19:30

## 2019-12-26 RX ADMIN — IPRATROPIUM BROMIDE 0.5 MG: 0.5 SOLUTION RESPIRATORY (INHALATION) at 07:42

## 2019-12-26 RX ADMIN — GUAIFENESIN 1200 MG: 600 TABLET, EXTENDED RELEASE ORAL at 08:46

## 2019-12-26 RX ADMIN — GUAIFENESIN 1200 MG: 600 TABLET, EXTENDED RELEASE ORAL at 20:02

## 2019-12-26 RX ADMIN — PREDNISONE 60 MG: 20 TABLET ORAL at 08:46

## 2019-12-26 RX ADMIN — ASPIRIN 81 MG: 81 TABLET, COATED ORAL at 08:46

## 2019-12-26 RX ADMIN — LEVALBUTEROL HYDROCHLORIDE 1.25 MG: 1.25 SOLUTION, CONCENTRATE RESPIRATORY (INHALATION) at 13:23

## 2019-12-26 RX ADMIN — GUAIFENESIN AND CODEINE PHOSPHATE 5 ML: 100; 10 SOLUTION ORAL at 23:02

## 2019-12-26 RX ADMIN — TRIAMTERENE AND HYDROCHLOROTHIAZIDE 1 TABLET: 37.5; 25 TABLET ORAL at 08:46

## 2019-12-26 RX ADMIN — LORAZEPAM 0.25 MG: 0.5 TABLET ORAL at 19:50

## 2019-12-26 ASSESSMENT — ACTIVITIES OF DAILY LIVING (ADL)
ADLS_ACUITY_SCORE: 19
ADLS_ACUITY_SCORE: 19
ADLS_ACUITY_SCORE: 20
ADLS_ACUITY_SCORE: 20
ADLS_ACUITY_SCORE: 19
ADLS_ACUITY_SCORE: 19

## 2019-12-26 ASSESSMENT — MIFFLIN-ST. JEOR: SCORE: 1368.11

## 2019-12-26 NOTE — PROGRESS NOTES
[facility]  Hospitalist Progress Note        Brittany Jett MD  12/26/2019        Interval History:      Patient feeling relatively well. Still desatting to mid 80s even at rest (checked while in room). She had some questions regarding cardiac diagnosis. Anxious about discharge, especially given that she lives alone. Daughter at bedside, said she will likely stay with pt on discharge as needed. Pt denies CP. Dyspnea improving a bit, but slowly. Gets fatigued and SOB just getting up to restroom.         Assessment and Plan:      79 year old female with history of COPD, O2 dependent at night (on 4L), pulmonary nodules, sleep apnea, HTN, and macular degeneration who presented to the ED with SOB,  admitted for COPD exacerbation.  - PTA was recently started on levofloxacin and prednisone burst/taper per Pulioana THOMAS on 12/14     # COPD exacerbation  # Acute on chronic hypoxic respiratory failure  # History of pulmonary nodules  # History of sleep apnea (mild) and nocturnal O2 dependent (4lts by NC)  - CXR on admission no significant infiltrates. Blood cultures 12/17 with no growth so far, influenza negative. CT chest showed no PE but moderate bronchial wall thickening, bronchiectasis, mucous plugging as well as bilateral groundglass infiltrates and scattered consolidative changes in a bronchovascular distribution.  - Completed course of antibiotic treatment for CAP.  - xopenex/ipratropium nebs given tachycardia.   - Completed steroid burst. Last dose today. Will monitor off prednisone.  - Pulmonology consulted. Greatly appreciate recommendations; recommend outpatient pulmonary followup with Dr. Miller at [facility].   - assessed for home oxygen; needs 2 lts NC during day as well; SW arranged for portable tank     # Elevated troponin, demand ischemia  # No CAD history  # HTN  # Cardiac cath 12/23--no significant CAD  - trops peaked to 3.725. s/p cardiac cath 12/23--noted no significant CAD;  "cardiology signed off  - started on ASA 81 mg daily  - ECHO 12/19 noted as technically difficult study but with EF 60-65% and no significant wall motion abnormalities. pro BNP elevated at 3k.  - Amlodipine titrated up to 10mg daily for better control  - Resumed on dyazide after diuresis with lasix    DVT Prophylaxis:   Enoxaparin (Lovenox) subcutaneous    Code Status:   -Full Code          Disposition Plan  Home tomorrow. Home oxygen already in place.         Physical Exam:      Heart Rate: 99, Blood pressure 131/65, pulse 72, temperature 95.9  F (35.5  C), temperature source Oral, resp. rate 18, height 1.702 m (5' 7\"), weight 85.5 kg (188 lb 9.6 oz), SpO2 95 %.  Vitals:    12/24/19 0500 12/25/19 0640 12/26/19 0346   Weight: 85.9 kg (189 lb 6.4 oz) 86.1 kg (189 lb 12.8 oz) 85.5 kg (188 lb 9.6 oz)     Vital Signs with Ranges  Temp:  [95.9  F (35.5  C)-97.3  F (36.3  C)] 95.9  F (35.5  C)  Pulse:  [72-97] 72  Heart Rate:  [72-99] 99  Resp:  [18-19] 18  BP: (131-177)/(63-70) 131/65  SpO2:  [95 %-97 %] 95 %  I/O's Last 24 hours  I/O last 3 completed shifts:  In: 240 [P.O.:240]  Out: -     Constitutional: Alert awake oriented     Lungs:  Clear to auscultation with no audible wheezes or rhonchi     Cardiovascular: S1 and S2 no S3      Abdomen: Abdomen soft no guarding ,no rigidity, bowel sounds are positive     Skin:    Other:           Medications:          [START ON 12/27/2019] amLODIPine  10 mg Oral Daily     aspirin  81 mg Oral Daily     guaiFENesin  1,200 mg Oral BID     ipratropium  0.5 mg Nebulization Q6H     levalbuterol  1.25 mg Nebulization Q6H     senna-docusate  1 tablet Oral BID    Or     senna-docusate  2 tablet Oral BID     sodium chloride (PF)  3 mL Intracatheter Q8H     triamterene-HCTZ  1 tablet Oral Daily     PRN Meds: acetaminophen, guaiFENesin-codeine, HOLD MEDICATION, hydrALAZINE, LORazepam, magnesium sulfate, melatonin, naloxone, ondansetron **OR** ondansetron, - MEDICATION INSTRUCTIONS -, sodium " chloride (PF)         Data:      All new lab and imaging data was reviewed.   Recent Labs   Lab Test 12/23/19  0748 12/21/19  0646 12/20/19  1620 12/19/19  0626 12/17/19  2349   WBC 7.4  --   --  6.4 8.5   HGB 13.5  --   --  12.5 13.3   MCV 91  --   --  92 91    219 220 189 204   INR  --   --   --  1.00  --       Recent Labs   Lab Test 12/24/19  0622 12/23/19  0748 12/22/19  1722 12/21/19  0646  12/19/19  0626    140  --   --   --  138   POTASSIUM 4.0 3.4 3.5  --   --  3.8   CHLORIDE 99 98  --   --   --  104   CO2 40* 42*  --   --   --  33*   BUN 26 29  --   --   --  21   CR 0.60 0.74  --  0.68   < > 0.64   ANIONGAP <1* <1*  --   --   --  1*   TANISHA 8.6 8.8  --   --   --  8.7   GLC 94 94  --   --   --  138*    < > = values in this interval not displayed.     Recent Labs   Lab Test 12/18/19  1853 12/18/19  1541 12/18/19  1017   TROPI 3.725* 1.716* 1.734*

## 2019-12-26 NOTE — PLAN OF CARE
Pt here with COPD exacerbation. A&O x4. VSS. Regular diet, thin liquids. Takes pills whole. Up with SBA. Denies pain. Pt scoring green on the Aggression Stop Light Tool. Plan to discharge home tomorrow. Continue to monitor and follow POC.

## 2019-12-26 NOTE — PROGRESS NOTES
Writer met with patient and daughter at the bedside.  Patient is most likely ready for discharge to home today per MD notes.  Patient is aware of discharge follow up recommendations to include PCP, Pulm and Cardiology.  Patient prefers to make her follow up's with PCP and Pulm and was allowable to have writer schedule Cardiology follow up. Patient asking for [facility] [place]. Writer called to schedule the following appointment(s).      Jan 07, 2020 12:10 PM CST  LAB with [facility]  [facility] HEART [facility] ([facility]) [address] [place] [id number] [phone]       Before you lab test (cholesterol test): Unless we tell you otherwise, you may only have water before your test. Stop all other food and drink 8 hours before the test (no juice, tea, coffee, soda, soft foods etc.) If you take medicine in the morning, take it with water.      Jan 07, 2020  1:10 PM CST  Return Discharge with VI Hernandez CNP  [facility] ([facility]) [address] [place] [id number] [phone] OPT 2                   Follow-Up Appointment Instructions      Patient and daughter have no further questions or discharge planning needs at this time.

## 2019-12-26 NOTE — PROVIDER NOTIFICATION
MD Notification    Notified Person: MD    Notified Person Name: Johnie     Notification Date/Time: 12/26 12:11 p.m.    Notification Interaction: FYI page as patient and daughter are asking for Cardiology to be consulted? Per notes looks like they signed off and recommended follow up in 1-2 weeks.     Purpose of Notification: FYI page as the patient/dtr are asking for further clarification of cardiology to see prior to discharge home    Orders Received: await a c/b for further clarification if needed    Comments: MD to see patient and daughter.

## 2019-12-26 NOTE — PROGRESS NOTES
"PULMONOLOGY PROGRESS NOTE  Date of service: 2019  [facility]    CC/Reason for Visit: COPD exacerbation.    SUBJECTIVE      HPI:  Cardiac cath results noted. No new events overnight,  Afebrile. No worsening respiratory complaints at this time.      ROS: A Problem Pertinent review of systems was negative except for items noted in HPI.    Past Medical, Family, and Social/Substance History has been reviewed: No interval changes.    OBJECTIVE   BP (!) 177/70 (BP Location: Right arm)   Pulse 72   Temp 96.2  F (35.7  C) (Oral)   Resp 19   Ht 1.702 m (5' 7\")   Wt 85.5 kg (188 lb 9.6 oz)   SpO2 96%   BMI 29.54 kg/m   4 L/min     Temp (24hrs), Av.7  F (36.5  C), Min:97.3  F (36.3  C), Max:98  F (36.7  C)       Intake/Output Summary (Last 24 hours) at 2019 1025  Last data filed at 2019 2130  Gross per 24 hour   Intake 1080 ml   Output 1640 ml   Net -560 ml     Patient Vitals for the past 96 hrs:   Weight   19 0346 85.5 kg (188 lb 9.6 oz)   19 0640 86.1 kg (189 lb 12.8 oz)   19 0500 85.9 kg (189 lb 6.4 oz)   19 0651 85.5 kg (188 lb 7.9 oz)       CONSTITUTIONAL/GENERAL APPEARANCE: Alert. No Apparent Distress.  PSYCHIATRIC: Pleasant and appropriate mood and affect. Oriented x 3.  EARS, NOSE,THROAT,MOUTH: External ears and nose overall normal. Normal oral mucosa.   NECK: Neck appearance normal. No neck masses and the thyroid is not enlarged.   RESPIRATORY: Non-labored effort. Decreased BS, prolonged exp phase with wheezes.  CARDIOVASCULAR: S1, S2, regular rate and rhythm.      LABORATORY ASSESSMENT    No lab results found in last 7 days.  Recent Labs   Lab 19  0748 19  0646   WBC 7.4  --    RBC 4.80  --    HGB 13.5  --    HCT 43.8  --    MCV 91  --    MCH 28.1  --    MCHC 30.8*  --    RDW 14.7  --     219     Recent Labs   Lab 19  0622 19  0748 19  1722 19  0646    140  --   --    POTASSIUM 4.0 3.4 3.5  --  "   CHLORIDE 99 98  --   --    CO2 40* 42*  --   --    ANIONGAP <1* <1*  --   --    BUN 26 29  --   --    CR 0.60 0.74  --  0.68   GFRESTIMATED 87 78  --  83   GFRESTBLACK >90 >90  --  >90   TANISHA 8.6 8.8  --   --      No lab results found in last 7 days.  No lab results found in last 7 days.    Additional labs and/or comments:    IMAGING      CT Chest 12/20 -  IMPRESSION:   1. No pulmonary embolism demonstrated.  2. No thoracic aortic dissection or aneurysm.   3. Moderate bronchial wall thickening, bronchiectasis, mucous  plugging.  4. Bilateral groundglass infiltrates and scattered consolidative  changes in a bronchovascular distribution.    CXR 12/17 -  IMPRESSION: No evidence for acute focal alveolar infiltrate or consolidation. Normal heart size. Normal pulmonary vascularity. Stable opacity involving the left lung base representing a prominent nipple shadow. No evidence for pneumothorax. Minimal  degenerative changes in the left acromioclavicular joint and spine.    PFT & OTHER TESTING     Echo 12/19 -  Interpretation Summary  Technically difficult study due to poor endocardial delineation.  Left ventricular systolic function is normal.  The visual ejection fraction is estimated at 60-65%.  Wall motion probably normal on limited views.  The right ventricle is normal in structure, function and size.  No significant valve dysfunction.  Unable to estimate PA systolic pressure.  Dilation of the inferior vena cava is present with normal respiratory variation in diameter.  There is no pericardial effusion.  Compared to prior study dated 12/17/2015, no significant change.    ASSESSMENT / PLAN      Principal Problem:    NSTEMI (non-ST elevated myocardial infarction) (H)  Active Problems:    COPD exacerbation (H)      ASSESSMENT: 79-year-old female with a history of COPD on nocturnal O2, untreated obstructive sleep apnea, benign pulmonary nodules (CT scan of the chest 9/27/2019 showed no change in the bilateral pulmonary  nodules versus 6/21/2012), mild bronchiectasis admitted with a 3-day history of worsening shortness of breath and cough productive of greenish sputum.  Patient is maintained on Advair 250/50 and DuoNebs/Albuterol prn.  She was recently treated with a course of Levaquin and prednisone by her Pulmonologist Dr. Yris Miller at [facility]. Chest x-ray on admission showed no acute infiltrates.  CT scan of the chest 12/20 was negative for PE and demonstrated moderate bronchial wall thickening, bronchiectasis and mucous plugging. Troponins elevated.  COPD exacerbation likely secondary to bronchitis. Patient is slowly improving on current rx. Respiratory status stable on low flow O2.    Diagnoses  Abnl CT/CXR  R91.8  Bronchiectasis J47.9  Bronchitis acute J20.9  COPD exacerb J44.1  Hypoxemia  R09.02  Sleep apnea obstr G47.33  SOB   R06.02    PLAN:  1. Adjust oxygen, keep SaO2 > 90%  2. Bronchodilators - Xopenex + Atrovent nebs  3. Steroids - Prednisone  4. Mucolytics - Mucinex.  5. Increase activity and IS.  6. Recommend outpatient Pulmonary follow-up with Dr. Miller at [facility].  7. Please call if any questions      Chau Sunshine MD  [facility]  [phone] (pager)  [phone] (office)

## 2019-12-26 NOTE — PLAN OF CARE
Pt is A&Ox4. VSS on 2-3L O2 ex HTN at times. Up SBA to BR. On a regular diet. Denies pain. PIV SL. LS diminished w/ expiratory wheezes, scheduled nebs and robitussin given. Tele is SR-ST. PT, OT, and smoking cessation following. Plan to discharge home today pending progress.

## 2019-12-26 NOTE — PROGRESS NOTES
12/26/19 0930   Quick Adds   Type of Visit Initial PT Evaluation   Living Environment   Lives With alone   Living Arrangements condominium   Home Accessibility no concerns   Transportation Anticipated family or friend will provide   Living Environment Comment Pt lives alone in a condo with no stairs to manage.   Self-Care   Usual Activity Tolerance good   Current Activity Tolerance moderate   Regular Exercise No   Equipment Currently Used at Home none   Activity/Exercise/Self-Care Comment Pt was ind with all ADL's and iADL's   Functional Level Prior   Ambulation 0-->independent   Transferring 0-->independent   Toileting 0-->independent   Bathing 0-->independent   Communication 0-->understands/communicates without difficulty   Swallowing 0-->swallows foods/liquids without difficulty   Cognition 0 - no cognition issues reported   Fall history within last six months no   Which of the above functional risks had a recent onset or change? none   Prior Functional Level Comment Pt was ind with ambulation   General Information   Onset of Illness/Injury or Date of Surgery - Date 12/17/19   Referring Physician Titus Rosas MD   Patient/Family Goals Statement Improved breathing   Pertinent History of Current Problem (include personal factors and/or comorbidities that impact the POC) Pt is a 79 yr old female admitted for COPD exacerbation.   Precautions/Limitations fall precautions;oxygen therapy device and L/min   Weight-Bearing Status - LLE full weight-bearing   Weight-Bearing Status - RLE full weight-bearing   General Observations Pleasant and cooperative   Cognitive Status Examination   Orientation orientation to person, place and time   Level of Consciousness alert   Follows Commands and Answers Questions 100% of the time   Personal Safety and Judgment intact   Memory intact   Pain Assessment   Patient Currently in Pain No   Range of Motion (ROM)   ROM Comment BLE: WFL   Strength   Strength Comments BLE: 4+/5  "  Bed Mobility   Bed Mobility Comments Supine>sit: SBA with bed flat, using bed rails; Sit>supine: HOB elevated (per request of pt, due to SOB after activity) and no bed rails: supervision   Transfer Skills   Transfer Comments STS at independent   Gait   Gait Comments 20 ft using RW and independent, supervision of PT for O2 monitoring   Balance   Balance Comments Sitting:Good   Sensory Examination   Sensory Perception no deficits were identified   Coordination   Coordination no deficits were identified   Muscle Tone   Muscle Tone no deficits were identified   General Therapy Interventions   Planned Therapy Interventions balance training;bed mobility training;gait training;transfer training;risk factor education;home program guidelines;progressive activity/exercise   Clinical Impression   Criteria for Skilled Therapeutic Intervention yes, treatment indicated   PT Diagnosis Deconditioning   Influenced by the following impairments Decreased activity tolerance   Functional limitations due to impairments assistance needed with bed mobility   Clinical Presentation Stable/Uncomplicated   Clinical Presentation Rationale clincial judgement   Clinical Decision Making (Complexity) Low complexity   Therapy Frequency Daily   Predicted Duration of Therapy Intervention (days/wks) 5   Anticipated Equipment Needs at Discharge front wheeled walker   Anticipated Discharge Disposition Home with Assist;Home with Home Therapy   Risk & Benefits of therapy have been explained Yes   Patient, Family & other staff in agreement with plan of care Yes   Clinical Impression Comments Pt presents with decreased activity tolerance limiting independence with fucntional mobility. Pt may benefit from an additonal PT visists to achieve increased independence with all fucntional mobility.    [facility] AM-PAC  \"6 Clicks\" V.2 Basic Mobility Inpatient Short Form   1. Turning from your back to your side while in a flat bed without using bedrails? 4 - " None   2. Moving from lying on your back to sitting on the side of a flat bed without using bedrails? 3 - A Little   3. Moving to and from a bed to a chair (including a wheelchair)? 4 - None   4. Standing up from a chair using your arms (e.g., wheelchair, or bedside chair)? 4 - None   5. To walk in hospital room? 4 - None   6. Climbing 3-5 steps with a railing? 3 - A Little   Basic Mobility Raw Score (Score out of 24.Lower scores equate to lower levels of function) 22   Total Evaluation Time   Total Evaluation Time (Minutes) 10

## 2019-12-26 NOTE — PLAN OF CARE
"Discharge Planner PT   Patient plan for discharge: Home   Current status: PT eval completed, treatment initiated. Pt is a 79 yr old female admitted for COPD exacerbation. Pt lives alone in a condo with no stairs to manage. Pt was ind with all ADL\"s, IADL's and ambulation prior to admission.   Pt was received supine in bed, daughter at bs. Pt is at SBA with supine>sit using bed rails, bed flat. Pt sat at EOB independently and stood independently. Pt ambulated 200 ft using RW and supervision for O2 sats monitoring. Pt was dependent on 1L of O2 and noted O2 saturating ranging from 87%-92% during ambulation. Pt was independent with toileting. Pt returned to bed with HOB elevated per pt request and supervision. Discussed the importance of pacing and engery conservations techniques with daughter and pt. Discussed PT POC and recommendations.   Barriers to return to prior living situation: None anticipated.   Recommendations for discharge: Home with assist of daughter for household chores, IADL's and bed mobility,and  HH PT.   Rationale for recommendations: Pt and daughter reported having many friends that are able to assist pt at home. Daughter also reported being able to assist as needed. Pt will benefit from HH PT to achieve increased activity tolerance and PLOF. Pt is appropriate for HH PT, as pt required an assistive device, an assist of one person and taxing effort to leave home.        Entered by: Chelsy Amaya 12/26/2019 10:21 AM       "

## 2019-12-26 NOTE — PLAN OF CARE
Alert and oriented X 4, up with SBA. Reported increasing SOB this AM/early afternoon with increased anxiety; improved with PRN ativan, scheduled nebulizers. O2 stable on 2-3L. LS diminished. Denies pain. Tolerating regular diet. SR-ST on tele. Plan to discharge home tomorrow if medically stable.

## 2019-12-27 VITALS
BODY MASS INDEX: 29.3 KG/M2 | HEIGHT: 67 IN | TEMPERATURE: 95.2 F | RESPIRATION RATE: 18 BRPM | DIASTOLIC BLOOD PRESSURE: 63 MMHG | OXYGEN SATURATION: 94 % | WEIGHT: 186.7 LBS | HEART RATE: 95 BPM | SYSTOLIC BLOOD PRESSURE: 154 MMHG

## 2019-12-27 PROCEDURE — 94640 AIRWAY INHALATION TREATMENT: CPT

## 2019-12-27 PROCEDURE — 25000132 ZZH RX MED GY IP 250 OP 250 PS 637: Mod: GY | Performed by: INTERNAL MEDICINE

## 2019-12-27 PROCEDURE — 25000125 ZZHC RX 250: Performed by: INTERNAL MEDICINE

## 2019-12-27 PROCEDURE — 25000132 ZZH RX MED GY IP 250 OP 250 PS 637: Mod: GY | Performed by: HOSPITALIST

## 2019-12-27 PROCEDURE — 40000275 ZZH STATISTIC RCP TIME EA 10 MIN

## 2019-12-27 PROCEDURE — 94640 AIRWAY INHALATION TREATMENT: CPT | Mod: 76

## 2019-12-27 PROCEDURE — 99239 HOSP IP/OBS DSCHRG MGMT >30: CPT | Performed by: HOSPITALIST

## 2019-12-27 RX ORDER — GUAIFENESIN 600 MG/1
1200 TABLET, EXTENDED RELEASE ORAL 2 TIMES DAILY
Qty: 60 TABLET | Refills: 1 | Status: SHIPPED | OUTPATIENT
Start: 2019-12-27

## 2019-12-27 RX ORDER — AMLODIPINE BESYLATE 10 MG/1
10 TABLET ORAL DAILY
Qty: 30 TABLET | Refills: 1 | Status: SHIPPED | OUTPATIENT
Start: 2019-12-28 | End: 2021-03-10

## 2019-12-27 RX ORDER — LORAZEPAM 0.5 MG/1
0.5 TABLET ORAL EVERY 4 HOURS PRN
Qty: 20 TABLET | Refills: 0 | Status: SHIPPED | OUTPATIENT
Start: 2019-12-27

## 2019-12-27 RX ADMIN — LEVALBUTEROL HYDROCHLORIDE 1.25 MG: 1.25 SOLUTION, CONCENTRATE RESPIRATORY (INHALATION) at 14:01

## 2019-12-27 RX ADMIN — IPRATROPIUM BROMIDE 0.5 MG: 0.5 SOLUTION RESPIRATORY (INHALATION) at 14:01

## 2019-12-27 RX ADMIN — IPRATROPIUM BROMIDE 0.5 MG: 0.5 SOLUTION RESPIRATORY (INHALATION) at 09:55

## 2019-12-27 RX ADMIN — AMLODIPINE BESYLATE 10 MG: 10 TABLET ORAL at 10:25

## 2019-12-27 RX ADMIN — ASPIRIN 81 MG: 81 TABLET, COATED ORAL at 10:25

## 2019-12-27 RX ADMIN — TRIAMTERENE AND HYDROCHLOROTHIAZIDE 1 TABLET: 37.5; 25 TABLET ORAL at 10:24

## 2019-12-27 RX ADMIN — LEVALBUTEROL HYDROCHLORIDE 1.25 MG: 1.25 SOLUTION, CONCENTRATE RESPIRATORY (INHALATION) at 09:55

## 2019-12-27 RX ADMIN — SENNOSIDES AND DOCUSATE SODIUM 1 TABLET: 8.6; 5 TABLET ORAL at 10:25

## 2019-12-27 RX ADMIN — GUAIFENESIN 1200 MG: 600 TABLET, EXTENDED RELEASE ORAL at 10:25

## 2019-12-27 ASSESSMENT — ACTIVITIES OF DAILY LIVING (ADL)
ADLS_ACUITY_SCORE: 17
ADLS_ACUITY_SCORE: 17
ADLS_ACUITY_SCORE: 19
ADLS_ACUITY_SCORE: 17
ADLS_ACUITY_SCORE: 20

## 2019-12-27 ASSESSMENT — MIFFLIN-ST. JEOR: SCORE: 1359.5

## 2019-12-27 NOTE — DISCHARGE INSTRUCTIONS
Cardiology Appointment    Jan 07, 2020 12:10 PM CST  LAB with [facility]  [facility] [place] ([facility]) [address] [id number] [phone]       Before you lab test (cholesterol test): Unless we tell you otherwise, you may only have water before your test. Stop all other food and drink 8 hours before the test (no juice, tea, coffee, soda, soft foods etc.) If you take medicine in the morning, take it with water.       Jan 07, 2020  1:10 PM CST  Return Discharge with VI Hernandez CNP  [facility] ([facility]) [address] [id number]  [facility] [id number] [phone] OPT 2

## 2019-12-27 NOTE — PROGRESS NOTES
[facility]  Met with patient and daughter Vonnie to discuss plans for HC.  Pt to be discharged home today and has agreed to have [facility] follow with services of SN, PT and OT.  Patient care support center processing referral.  Pt and family verbalized understanding that initial visit is scheduled for 12/28 or 12/29. Pt has 24 hour phone number for [facility] for any questions or concerns.

## 2019-12-27 NOTE — PLAN OF CARE
AVS printed and given to patient. Discharge instructions, follow up appointments, and new medications reviewed with patient. All questions answered, patient and daughter verbalized understanding. All belongings and new medications sent with patient. Patient discharged to home with daughter as .

## 2019-12-27 NOTE — PLAN OF CARE
A&Ox4. HTN in 170's- did not meet parameters for hydralazine. LS diminished, BROWN. C/o SOB and anxiety in evening, PRN ativan effective. Using 2L 02 during the day and 4L 02 at night. Loose, productive cough, prn cough syrup x1. Denies pain. Up with SBA using gait belt and walker, ambulated hallways x1. Plan for probable discharge home today with 02.

## 2019-12-27 NOTE — PLAN OF CARE
Discharge Planner OT   Patient plan for discharge: home  Current status: Per PT, pt mobilizing well and is anticipated to return home safety w/ respiratory needs being biggest barrier. Pt has very supportive family who are able to help w/ ADL's and IADL's as needed. No skilled IP OT needs identified, OT order completed.  Barriers to return to prior living situation: none  Recommendations for discharge: defer to PT note  Rationale for recommendations: No skilled IP OT needs needed 2' pt ind w/ ADL's and will have A w/ IADL's       Entered by: Soco Vazquez 12/27/2019 1:21 PM

## 2019-12-27 NOTE — PLAN OF CARE
PT Note 12/27/2019 3:32 PM    PT attempted. Pt is with RN going over discharge instructions at this time.     Physical Therapy Discharge Summary    Reason for therapy discharge:    Discharged to Home with assist    Progress towards therapy goal(s). See goals on Care Plan in [facility] electronic health record for goal details.  Goals partially met.  Barriers to achieving goals:   discharge from facility.    Therapy recommendation(s):    Continued therapy is recommended.  Rationale/Recommendations:  to achieve increased independece with fucntional mobility and increased activity tolerance. .

## 2019-12-27 NOTE — PROGRESS NOTES
Writer checked in with both patient and daughter.  Patient is asking for pulm follow up to be scheduled with Dr. Dudley.   has the following appointment scheduled:    Friday January 17th at 9:45 a.m. with a 9:30 a.m. check in time with Ignacia Sweeney.  Please keep your scheduled appointment with Dr. Dudley in March. Please call the clinic at [phone] if you have questions regarding this appointment.   [facility] [address] [facility]    [facility] [place] [address]    [phone]

## 2019-12-27 NOTE — DISCHARGE SUMMARY
[facility]  Hospitalist Discharge Summary       Date of Admission:  12/17/2019  Date of Discharge:  12/27/2019  Discharging Provider: Brittany Jett MD      Discharge Diagnoses   COPD exacerbation  Acute on chronic hypoxic respiratory failure  History of pulmonary nodules  History of sleep apnea (mild) and nocturnal O2 dependent (4lts by NC)   Elevated troponin, demand ischemia  HTN  Cardiac cath 12/23--no significant CAD    Follow-ups Needed After Discharge   Follow-up Appointments     Follow-up and recommended labs and tests       Follow up with primary care provider, Damon Leon, within 7 days for   hospital follow- up.   Please call the clinic upon discharge regarding   your hospital admission and any required labs/follow up needed.     Please see your follow up appointment for [facility].  Located at:    [facility]   [address]  [address] [place] [address]   [phone]     Outpatient Pulmonary follow-up with Dr. Miller at [facility]  [address], [place] [address] [phone]             Discharge Disposition   Admited to home care:  RN, PT, HHA  Discharged to home  Condition at discharge: Stable    Hospital Course    79 year old female with history of COPD, O2 dependent at night (on 4L), pulmonary nodules, sleep apnea, HTN, and macular degeneration who presented to the ED with SOB,  admitted for COPD exacerbation.  - PTA was recently started on levofloxacin and prednisone burst/taper per Nick THOMAS on 12/14     # COPD exacerbation  # Acute on chronic hypoxic respiratory failure  # History of pulmonary nodules  # History of sleep apnea (mild) and nocturnal O2 dependent (4lts by NC)  - CXR on admission no significant infiltrates. Blood cultures 12/17 with no growth so far, influenza negative. CT chest showed no PE but moderate bronchial wall thickening, bronchiectasis, mucous plugging as well as bilateral groundglass infiltrates and  scattered consolidative changes in a bronchovascular distribution.  - Completed course of antibiotic treatment for CAP.  - xopenex/ipratropium nebs given tachycardia.   - Completed steroid burst. Last dose today. Will monitor off prednisone.  - Pulmonology consulted. Greatly appreciate recommendations; recommend outpatient pulmonary followup with Dr. Miller at [facility].   - assessed for home oxygen; needs 2 lts NC during day as well; SW arranged for portable tank     # Elevated troponin, demand ischemia  # No CAD history  # HTN  # Cardiac cath 12/23--no significant CAD  - trops peaked to 3.725. s/p cardiac cath 12/23--noted no significant CAD; cardiology signed off  - started on ASA 81 mg daily  - ECHO 12/19 noted as technically difficult study but with EF 60-65% and no significant wall motion abnormalities. pro BNP elevated at 3k.  - Amlodipine titrated up to 10mg daily for better control  - Resumed on dyazide after diuresis with lasix    Consultations This Hospital Stay   PHARMACY TO DOSE VANCO  PHARMACY TO DOSE HEPARIN  CARDIOLOGY IP CONSULT  PULMONARY IP CONSULT  PHARMACY IP CONSULT  PHARMACY IP CONSULT  CARE TRANSITION RN/SW IP CONSULT  PHYSICAL THERAPY ADULT IP CONSULT  OCCUPATIONAL THERAPY ADULT IP CONSULT  SMOKING CESSATION PROGRAM IP CONSULT    Code Status   Full Code    Time Spent on this Encounter   I, Brittany Jett MD, personally saw the patient today and spent greater than 30 minutes discharging this patient.       Brittany Jett MD  [facility]  ______________________________________________________________________    Physical Exam   Vital Signs: Temp: 95.2  F (35.1  C) Temp src: Oral BP: (!) 154/63 Pulse: 95 Heart Rate: 72 Resp: 18 SpO2: 99 % O2 Device: Nasal cannula Oxygen Delivery: 2 LPM  Weight: 186 lbs 11.2 oz  Constitutional: awake, alert, cooperative, no apparent distress, and appears stated age  Respiratory: course breath sounds. On nasal cannula.  Breathing comfortably on oxygen.  Cardiovascular: Normal apical impulse, regular rate and rhythm, normal S1 and S2, no S3 or S4, and no murmur noted  GI: No scars, normal bowel sounds, soft, non-distended, non-tender, no masses palpated, no hepatosplenomegally  Skin: normal skin color, texture, turgor  Musculoskeletal: There is no redness, warmth, or swelling of the joints.  Full range of motion noted.  Motor strength is 5 out of 5 all extremities bilaterally.  Tone is normal.  Neuropsychiatric: General: normal and normal eye contact  Affect: anxious       Primary Care Physician   Damon Leon    Discharge Orders      Home care nursing referral      Home Care PT Referral for Hospital Discharge      Home Care OT Referral for Hospital Discharge      Reason for your hospital stay    Respiratory failure with hypoxia, 2/2 COPD with bronchitis and new diagnosis mild CHF. Coronary angio done.     Follow-up and recommended labs and tests     Follow up with primary care provider, Damon Leon, within 7 days for hospital follow- up.   Please call the clinic upon discharge regarding your hospital admission and any required labs/follow up needed.     Please see your follow up [facility] for [facility].  Located at:    [facility]   [address]  [address] [place] [address]   [phone]     Outpatient Pulmonary follow-up with Dr. Miller at [facility]  [address] [id number] [phone]     Activity    Your activity upon discharge: activity as tolerated     MD face to face encounter    Documentation of Face to Face and Certification for Home Health Services    I certify that patient: Nena Sanders is under my care and that I, or a nurse practitioner or physician's assistant working with me, had a face-to-face encounter that meets the physician face-to-face encounter requirements with this patient on: 12/27/2019.    This encounter with the patient was  in whole, or in part, for the following medical condition, which is the primary reason for home health care: chronic respiratory failure with hypoxia 2/2 COPD.    I certify that, based on my findings, the following services are medically necessary home health services: Nursing, Occupational Therapy and Physical Therapy.    My clinical findings support the need for the above services because: Nurse is needed: To provide assessment and oversight required in the home to assure adherence to the medical plan due to: new diagnosis chronic respiratory failure on home O2, mild systolic CHF, generalized weakness 2/2 illness and prolonged hospitalization..    Further, I certify that my clinical findings support that this patient is homebound (i.e. absences from home require considerable and taxing effort and are for medical reasons or [employer] services or infrequently or of short duration when for other reasons) because: Requires assistance of another person or specialized equipment to access medical services because patient: Is unable to walk greater than 5-10 feet without rest...    Based on the above findings. I certify that this patient is confined to the home and needs intermittent skilled nursing care, physical therapy and/or speech therapy.  The patient is under my care, and I have initiated the establishment of the plan of care.  This patient will be followed by a physician who will periodically review the plan of care.  Physician/Provider to provide follow up care: Damon Leon    Attending hospital physician (the Medicare certified [place] provider): Brittany Jett MD  Physician Signature: See electronic signature associated with these discharge orders.  Date: 12/27/2019     Full Code     Oxygen Adult    [place] [facility]xygen Order 2-3 liter(s) by nasal cannula continuously with use of portable tank. Expected treatment length is indefinite (99 months).. Test on conserving device as applicable.    Patients who  qualify for home O2 coverage under the CMS guidelines require ABG tests or O2 sat readings obtained closest to, but no earlier than 2 days prior to the discharge, as evidence of the need for home oxygen therapy. Testing must be performed while patient is in the chronic stable state. See notes for O2 sats.      Attending Provider: Brittany Jett MD  Physician signature: See electronic signature associated with these discharge orders  Date of Order: December 23, 2019     Diet    Follow this diet upon discharge: Cardiac       Significant Results and Procedures   Most Recent 3 CBC's:  Recent Labs   Lab Test 12/23/19  0748 12/21/19  0646 12/20/19  1620 12/19/19  0626 12/17/19  2349   WBC 7.4  --   --  6.4 8.5   HGB 13.5  --   --  12.5 13.3   MCV 91  --   --  92 91    219 220 189 204     Most Recent 3 BMP's:  Recent Labs   Lab Test 12/24/19  0622 12/23/19  0748 12/22/19  1722 12/21/19  0646  12/19/19  0626    140  --   --   --  138   POTASSIUM 4.0 3.4 3.5  --   --  3.8   CHLORIDE 99 98  --   --   --  104   CO2 40* 42*  --   --   --  33*   BUN 26 29  --   --   --  21   CR 0.60 0.74  --  0.68   < > 0.64   ANIONGAP <1* <1*  --   --   --  1*   TANISHA 8.6 8.8  --   --   --  8.7   GLC 94 94  --   --   --  138*    < > = values in this interval not displayed.   ,   Results for orders placed or performed during the hospital encounter of 12/17/19   XR Chest Port 1 View    Narrative    EXAM: XR CHEST PORT 1 VW  LOCATION: [facility]  DATE/TIME: 12/17/2019 11:51 PM    INDICATION: Cough  COMPARISON: 01/26/2019      Impression    IMPRESSION: No evidence for acute focal alveolar infiltrate or consolidation. Normal heart size. Normal pulmonary vascularity. Stable opacity involving the left lung base representing a prominent nipple shadow. No evidence for pneumothorax. Minimal   degenerative changes in the left acromioclavicular joint and spine.   CT Chest Pulmonary Embolism w Contrast    Narrative    CT CHEST  PULMONARY EMBOLISM WITH CONTRAST  2019 3:33 PM     HISTORY: Shortness of breath.    COMPARISON: None.    TECHNIQUE: Volumetric helical acquisition of CT images of the chest  from the lung apices to the kidneys were acquired after the  administration of 71mL Isovue-370  IV contrast. Radiation dose for  this scan was reduced using automated exposure control, adjustment of  the mA and/or kV according to patient size, or iterative  reconstruction technique.    FINDINGS: There is no pulmonary embolism. Groundglass infiltrates  noted bilaterally. Areas of bronchiectasis, bronchial wall thickening  and mucous plugging also evident. Mild scattered consolidative change.  The heart is not enlarged. Thoracic aorta is atherosclerotic without  evidence of dissection or aneurysm. There is no pleural or pericardial  effusion.  There is no pneumothorax. Adrenal glands are normal.  Remainder of the visualized upper abdomen is unremarkable.      Impression    IMPRESSION:   1. No pulmonary embolism demonstrated.  2. No thoracic aortic dissection or aneurysm.   3. Moderate bronchial wall thickening, bronchiectasis, mucous  plugging.  4. Bilateral groundglass infiltrates and scattered consolidative  changes in a bronchovascular distribution.    SHILOH SAMUELS MD   Echocardiogram Complete    Narrative    [id number]  [id number]  [id number]  [id number]^GIACOMO^CYNTHIA^RATILAL           [facility]  Echocardiography Laboratory  [address] [place] [address]        Name: VINNY MARISCAL  MRN: [id number]  : 1940  Study Date: 2019 03:00 PM  Age: 79 yrs  Gender: Female  Patient Location: [facility]  Reason For Study: Acute Myocardial Infarction  Ordering Physician: CYNTHIA GOODEN  Referring Physician: SHILOH STOCK  Performed By: Charlene Vasquez     BSA: 2.0 m2  Height: 67 in  Weight: 195 lb  HR: 89  BP: 155/82 mmHg  _____________________________________________________________________________  __         Procedure  Complete Portable Echo Adult.  _____________________________________________________________________________  __        Interpretation Summary     Technically difficult study due to poor endocardial delineation.     Left ventricular systolic function is normal.  The visual ejection fraction is estimated at 60-65%.  Wall motion probably normal on limited views.  The right ventricle is normal in structure, function and size.  No significant valve dysfunction.  Unable to estimate PA systolic pressure.  Dilation of the inferior vena cava is present with normal respiratory  variation in diameter.  There is no pericardial effusion.     Compared to prior study dated 12/17/2015, no significant change.  _____________________________________________________________________________  __        Left Ventricle  The left ventricle is normal in size. There is normal left ventricular wall  thickness. The visual ejection fraction is estimated at 60-65%. Left  ventricular systolic function is normal. Left ventricular diastolic function  is indeterminate. Normal left ventricular wall motion.     Right Ventricle  The right ventricle is normal in structure, function and size.     Atria  Normal left atrial size. Right atrial size is normal. Intact atrial septum.     Mitral Valve  The mitral valve is normal in structure and function. There is trace mitral  regurgitation.        Tricuspid Valve  The tricuspid valve is normal in structure and function. Right ventricular  systolic pressure could not be approximated due to inadequate tricuspid  regurgitation. There is trace tricuspid regurgitation.     Aortic Valve  The aortic valve is normal in structure and function. There is trace aortic  regurgitation.     Pulmonic Valve  The pulmonic valve is not well visualized.     Vessels  Normal size aorta. The aortic root is normal size. Dilation of the inferior  vena cava is present with normal respiratory variation in diameter.  "IVC  diameter and respiratory changes fall into an intermediate range suggesting an  RA pressure of 8 mmHg.     Pericardium  There is no pericardial effusion.     _____________________________________________________________________________  __  MMode/2D Measurements & Calculations  IVSd: 1.0 cm  LVIDd: 5.0 cm  LVIDs: 3.2 cm  LVPWd: 1.0 cm  FS: 34.5 %  LV mass(C)d: 185.5 grams  LV mass(C)dI: 92.7 grams/m2     Ao root diam: 3.1 cm  LA dimension: 3.8 cm  asc Aorta Diam: 2.9 cm  LA/Ao: 1.2  LVOT diam: 2.1 cm  LVOT area: 3.6 cm2  LA Volume (BP): 51.2 ml  LA Volume Index (BP): 25.6 ml/m2  RWT: 0.41        Doppler Measurements & Calculations  MV E max blanca: 83.1 cm/sec  MV A max blanca: 102.4 cm/sec  MV E/A: 0.81  MV dec time: 0.18 sec     PA V2 max: 73.6 cm/sec  PA max P.2 mmHg  PA acc time: 0.08 sec  E/E' av.1  Lateral E/e': 10.5  Medial E/e': 11.8           _____________________________________________________________________________  __           Report approved by: Hima Keen 2019 03:52 PM      Cardiac Catheterization    Narrative    1) \"nonobstructive\" coronary artery disease : Mild generalized   atheromatous changes in all coronaries with no significant narrowing. RCA   dominant.        Discharge Medications   Current Discharge Medication List      START taking these medications    Details   amLODIPine (NORVASC) 10 MG tablet Take 1 tablet (10 mg) by mouth daily  Qty: 30 tablet, Refills: 1    Associated Diagnoses: Chronic obstructive pulmonary disease with acute exacerbation (H); Acute and chronic respiratory failure with hypercapnia (H); COPD exacerbation (H)      aspirin (ASA) 81 MG EC tablet Take 1 tablet (81 mg) by mouth daily  Qty: 30 tablet, Refills: 11    Associated Diagnoses: Chronic obstructive pulmonary disease with acute exacerbation (H); Acute and chronic respiratory failure with hypercapnia (H); NSTEMI (non-ST elevated myocardial infarction) (H)      guaiFENesin (MUCINEX) 600 MG " 12 hr tablet Take 2 tablets (1,200 mg) by mouth 2 times daily  Qty: 60 tablet, Refills: 1    Associated Diagnoses: Chronic obstructive pulmonary disease with acute exacerbation (H); Acute and chronic respiratory failure with hypercapnia (H); COPD exacerbation (H)      LORazepam (ATIVAN) 0.5 MG tablet Take 1 tablet (0.5 mg) by mouth every 4 hours as needed for anxiety  Qty: 20 tablet, Refills: 0    Associated Diagnoses: Chronic obstructive pulmonary disease with acute exacerbation (H); Acute and chronic respiratory failure with hypercapnia (H); COPD exacerbation (H)      order for DME Equipment being ordered: Walker Wheels () and Walker ()  Treatment Diagnosis: Impaired gait  Qty: 1 each, Refills: 0    Associated Diagnoses: NSTEMI (non-ST elevated myocardial infarction) (H); COPD exacerbation (H)         CONTINUE these medications which have NOT CHANGED    Details   guaiFENesin-codeine (ROBITUSSIN AC) 100-10 MG/5ML SOLN Take 1 tsp. by mouth daily as needed       triamterene-HCTZ (DYAZIDE) 37.5-25 MG capsule Take 1 capsule by mouth daily         STOP taking these medications       levofloxacin (LEVAQUIN) 500 MG tablet Comments:   Reason for Stopping:         predniSONE (DELTASONE) 1 MG tablet Comments:   Reason for Stopping:             Allergies   No Known Allergies

## 2019-12-30 ENCOUNTER — TELEPHONE (OUTPATIENT)
Dept: CARDIOLOGY | Facility: CLINIC | Age: 79
End: 2019-12-30

## 2019-12-30 NOTE — TELEPHONE ENCOUNTER
Patient was evaluated by cardiology while inpatient for SOB, COPD exacerbation with elevated Troponin. 12/23/19 Coronary angiogram shows non-obstructive CAD-demand ischemia. Called patient to discuss any post hospital d/c questions she may have, review medication changes, and confirm f/u appts. Patient denied any questions regarding new medications or changes with their PTA medications. Patient denied any SOB, chest pain, or light headedness. RRA cardiac cath site is without bleeding, swelling, redness or tenderness. Denies fever. RN confirmed with patient that she has an apt scheduled on 1/7/19 with ESTELLE Nury Chauhan with labs prior. Discharged to home with 02 and [facility] services-Pt states [facility] RN is scheduled to come today for assessment. Patient advised to call clinic with any cardiac related questions or concerns prior to this estelle't. Patient verbalized understanding and agreed with plan. DARRICK Rossi RN.

## 2020-01-13 DIAGNOSIS — I10 HTN (HYPERTENSION): Primary | ICD-10-CM

## 2020-01-15 ENCOUNTER — OFFICE VISIT (OUTPATIENT)
Dept: CARDIOLOGY | Facility: CLINIC | Age: 80
End: 2020-01-15
Payer: MEDICARE

## 2020-01-15 VITALS
HEART RATE: 87 BPM | DIASTOLIC BLOOD PRESSURE: 74 MMHG | WEIGHT: 192 LBS | SYSTOLIC BLOOD PRESSURE: 138 MMHG | HEIGHT: 65 IN | BODY MASS INDEX: 31.99 KG/M2 | OXYGEN SATURATION: 92 %

## 2020-01-15 DIAGNOSIS — I25.10 CORONARY ARTERY DISEASE INVOLVING NATIVE CORONARY ARTERY OF NATIVE HEART WITHOUT ANGINA PECTORIS: Primary | ICD-10-CM

## 2020-01-15 DIAGNOSIS — I10 ESSENTIAL HYPERTENSION: ICD-10-CM

## 2020-01-15 DIAGNOSIS — I10 HTN (HYPERTENSION): ICD-10-CM

## 2020-01-15 LAB
ANION GAP SERPL CALCULATED.3IONS-SCNC: 8.6 MMOL/L (ref 6–17)
BUN SERPL-MCNC: 14 MG/DL (ref 7–30)
CALCIUM SERPL-MCNC: 10 MG/DL (ref 8.5–10.5)
CHLORIDE SERPL-SCNC: 96 MMOL/L (ref 98–107)
CO2 SERPL-SCNC: 37 MMOL/L (ref 23–29)
CREAT SERPL-MCNC: 0.86 MG/DL (ref 0.7–1.3)
GFR SERPL CREATININE-BSD FRML MDRD: 64 ML/MIN/{1.73_M2}
GLUCOSE SERPL-MCNC: 143 MG/DL (ref 70–105)
POTASSIUM SERPL-SCNC: 3.6 MMOL/L (ref 3.5–5.1)
SODIUM SERPL-SCNC: 138 MMOL/L (ref 136–145)

## 2020-01-15 PROCEDURE — 36415 COLL VENOUS BLD VENIPUNCTURE: CPT | Performed by: NURSE PRACTITIONER

## 2020-01-15 PROCEDURE — 80048 BASIC METABOLIC PNL TOTAL CA: CPT | Performed by: NURSE PRACTITIONER

## 2020-01-15 PROCEDURE — 99213 OFFICE O/P EST LOW 20 MIN: CPT | Performed by: NURSE PRACTITIONER

## 2020-01-15 ASSESSMENT — MIFFLIN-ST. JEOR: SCORE: 1343.85

## 2020-01-15 NOTE — PROGRESS NOTES
Cardiology Clinic Progress Note    Service Date: January 15, 2020    PRIMARY CARDIOLOGIST: Dr. Polo      REASON FOR VISIT:  Hospital follow up    HPI:   I met Ms. Nena Sanders today. She is a very pleasant 79 year old female with a history of COPD, sleep apnea, oxygen dependent at night, pulmonary nodules, basal cell carcinoma, macular degeneration, hypertension, and non-obstructive coronary artery disease. She was admitted to [facility] on 12/17/2019  for worsening shortness of breath. She was seen by Dr. Polo in a cardiology consult for a troponin elevation. She was found to have an exacerbation of COPD with acute bronchitis and respiratory failure. Her troponin elevation was thought to be primarily due to demand in the setting of the COPD exacerbation. Troponin peaked at 3.7. An echocardiogram was obtained and was a technically difficult study due to poor endocardial delineation, but demonstrated normal left ventricular function with an ejection fraction of 60-65%.     She ultimately underwent coronary angiography on 12/23/2019 for definitive evaluation. This showed mild, non-obstructive coronary artery disease with no significant focal lesions. She was discharged on aspirin 81 mg once daily and amlodipine was added at 10 mg once daily for further blood pressure control. Her dyazide was continued at 37.5-25 mg once daily.     Today, she presents to the clinic in follow up of her hospital stay. She tells me that she has been feeling well since she has been home with improvement in her cough and shortness of breath. She had previously only been using oxygen during sleep but is now using oxygen all day, typically at 1.5 L by nasal cannula. She denies concerns for chest pain, palpitations, dizziness, presyncope, or syncope. She has not had orthopnea, PND, or lower extremity edema. Her right wrist site from the angiogram has healed well without significant tenderness or bleeding. We reviewed  the results of her basic metabolic panel drawn today showing stable kidney function and electrolytes. Her blood glucose was 143 but she was not fasting and had just eaten lunch a few minutes prior to the visit.     She had questions regarding continuing aspirin based on her coronary angiogram findings. We reviewed the indications for aspirin therapy. With the mild plaque noted on her angiogram, I think there is evidence of benefit for her in taking a baby aspirin for risk reduction. We did discuss the increased risk for bleeding with this as well. She has been tolerating aspirin well and was agreeable to continuing this. We also discussed the option of statin therapy. Her baseline LDL was at 73 during her hospital stay and she was not keen on starting a statin so I did not push the issue.    ASSESSMENT AND PLAN:  1. Mild, non-obstructive coronary artery disease    Continue aspirin 81 mg once daily.    Counseled on lifestyle modifications including regular exercise and trying to stick to a heart healthy Mediterranean style diet.   2. Hypertension    On triamterine-hydrochlorothiazide 37.5-25 mg once daily.and amlodipine 10 mg once daily.    BP is marginally controlled in the clinic today at 138/74, but was well-controlled at a recent visit with her primary. Recommended she get a home blood pressure cuff and check her blood pressure periodically with a goal of under 130/85.  3. COPD    Shortness of breath and cough somewhat improved. Scheduled for follow-up with pulmonology next week.  4. Obstructive sleep apnea    Thank you for the opportunity to participate in this very nice lady's care. She seems to be doing quite well from a cardiac standpoint and our visit today was primarily focused on preventative measures. I gave her the option to follow up with Cardiology on an as needed basis and continue to follow with her primary care physician and Pulmonologist or to see Dr. Polo in routine follow up in about 1 year. I  encouraged her to call with any questions or concerns in the meantime, and we would happy to see her sooner if needed.     VI Lopez, CNP   Text Page  (8am - 5pm, M-F)    Orders this Visit:  Orders Placed This Encounter   Procedures     Follow-Up with Cardiologist     No orders of the defined types were placed in this encounter.    Medications Discontinued During This Encounter   Medication Reason     order for DME Therapy completed     Encounter Diagnoses   Name Primary?     Coronary artery disease involving native coronary artery of native heart without angina pectoris Yes     Essential hypertension      CURRENT MEDICATIONS:  Current Outpatient Medications   Medication Sig Dispense Refill     amLODIPine (NORVASC) 10 MG tablet Take 1 tablet (10 mg) by mouth daily 30 tablet 1     aspirin (ASA) 81 MG EC tablet Take 1 tablet (81 mg) by mouth daily 30 tablet 11     guaiFENesin (MUCINEX) 600 MG 12 hr tablet Take 2 tablets (1,200 mg) by mouth 2 times daily 60 tablet 1     guaiFENesin-codeine (ROBITUSSIN AC) 100-10 MG/5ML SOLN Take 1 tsp. by mouth daily as needed        LORazepam (ATIVAN) 0.5 MG tablet Take 1 tablet (0.5 mg) by mouth every 4 hours as needed for anxiety 20 tablet 0     triamterene-HCTZ (DYAZIDE) 37.5-25 MG capsule Take 1 capsule by mouth daily       ALLERGIES   No Known Allergies    PAST MEDICAL, SURGICAL, FAMILY HISTORY:  History was reviewed and updated as needed, see medical record.    SOCIAL HISTORY:  She lives in [place]. She is a never smoker. She drinks alcohol in moderation, usually having around 3 drinks a week. No other drug use.    Review of Systems:  Skin:  Negative     Eyes:  Negative    ENT:  Negative    Respiratory:  Positive for dyspnea on exertion;shortness of breath;cough  Cardiovascular:  Negative    Gastroenterology: Negative    Genitourinary:  Negative    Musculoskeletal:  Negative    Neurologic:  Negative    Psychiatric:  Negative    Heme/Lymph/Imm:  Negative    Endocrine:  " Negative       Physical Exam:  Vitals: /74   Pulse 87   Ht 1.638 m (5' 4.5\")   Wt 87.1 kg (192 lb)   SpO2 92%   BMI 32.45 kg/m     Wt Readings from Last 4 Encounters:   01/15/20 87.1 kg (192 lb)   12/27/19 84.7 kg (186 lb 11.2 oz)   01/26/19 86.2 kg (190 lb)   01/26/16 86.2 kg (190 lb)     GEN: Appears her stated age, well nourished, and in no acute distress.  HEENT: Pupils equal, round. Sclerae nonicteric.   C/V: Regular rate and rhythm, normal S1 and S2, no murmur, rub or gallop. 2+ radial and dorsalis pedis pulses bilaterally.  RESP: Respirations are unlabored. On O2 at 1.5 L NC. Lungs with poor air movement bilaterally, but no appreciable wheezing, rales, or rhonchi.  EXTREM: No clubbing, cyanosis, or lower extremity edema bilaterally.  NEURO: Alert and oriented, cooperative. Gait steady. No gross focal deficits.  SKIN: Warm and dry. The right radial site is soft, non-tender, and appears well healed with no ecchymosis or erythema.    Recent Lab Results:  LIPID RESULTS:  Lab Results   Component Value Date    CHOL 181 12/19/2019    HDL 98 12/19/2019    LDL 73 12/19/2019    TRIG 50 12/19/2019     LIVER ENZYME RESULTS:  Lab Results   Component Value Date    AST 34 12/19/2019    ALT 23 12/19/2019     CBC RESULTS:  Lab Results   Component Value Date    WBC 7.4 12/23/2019    RBC 4.80 12/23/2019    HGB 13.5 12/23/2019    HCT 43.8 12/23/2019    MCV 91 12/23/2019    MCH 28.1 12/23/2019    MCHC 30.8 (L) 12/23/2019    RDW 14.7 12/23/2019     12/23/2019     BMP RESULTS:  Lab Results   Component Value Date     01/15/2020    POTASSIUM 3.6 01/15/2020    CHLORIDE 96 (L) 01/15/2020    CO2 37 (H) 01/15/2020    ANIONGAP 8.6 01/15/2020     (H) 01/15/2020    BUN 14 01/15/2020    CR 0.86 01/15/2020    GFRESTIMATED 64 01/15/2020    GFRESTBLACK 77 01/15/2020    TANISHA 10.0 01/15/2020      This note was completed in part using Dragon voice recognition software. Although reviewed after completion, some word " and grammatical errors may occur.

## 2020-01-15 NOTE — LETTER
1/15/2020    Damon eLon MD  [facility] [address] [facility] [address] [place] [address]    RE: Nena Nelsone       Dear Colleague,    I had the pleasure of seeing Nena Sanders in the [facility].    Cardiology Clinic Progress Note    Service Date: January 15, 2020    PRIMARY CARDIOLOGIST: Dr. Polo      REASON FOR VISIT:  Hospital follow up    HPI:   I met Ms. Nena Sanders today. She is a very pleasant 79 year old female with a history of COPD, sleep apnea, oxygen dependent at night, pulmonary nodules, basal cell carcinoma, macular degeneration, hypertension, and non-obstructive coronary artery disease. She was admitted to [facility] on 12/17/2019  for worsening shortness of breath. She was seen by Dr. Polo in a cardiology consult for a troponin elevation. She was found to have an exacerbation of COPD with acute bronchitis and respiratory failure. Her troponin elevation was thought to be primarily due to demand in the setting of the COPD exacerbation. Troponin peaked at 3.7. An echocardiogram was obtained and was a technically difficult study due to poor endocardial delineation, but demonstrated normal left ventricular function with an ejection fraction of 60-65%.     She ultimately underwent coronary angiography on 12/23/2019 for definitive evaluation. This showed mild, non-obstructive coronary artery disease with no significant focal lesions. She was discharged on aspirin 81 mg once daily and amlodipine was added at 10 mg once daily for further blood pressure control. Her dyazide was continued at 37.5-25 mg once daily.     Today, she presents to the clinic in follow up of her hospital stay. She tells me that she has been feeling well since she has been home with improvement in her cough and shortness of breath. She had previously only been using oxygen during sleep but is now using oxygen all day, typically at 1.5 L by nasal cannula.  She denies concerns for chest pain, palpitations, dizziness, presyncope, or syncope. She has not had orthopnea, PND, or lower extremity edema. Her right wrist site from the angiogram has healed well without significant tenderness or bleeding. We reviewed the results of her basic metabolic panel drawn today showing stable kidney function and electrolytes. Her blood glucose was 143 but she was not fasting and had just eaten lunch a few minutes prior to the visit.     She had questions regarding continuing aspirin based on her coronary angiogram findings. We reviewed the indications for aspirin therapy. With the mild plaque noted on her angiogram, I think there is evidence of benefit for her in taking a baby aspirin for risk reduction. We did discuss the increased risk for bleeding with this as well. She has been tolerating aspirin well and was agreeable to continuing this. We also discussed the option of statin therapy. Her baseline LDL was at 73 during her hospital stay and she was not keen on starting a statin so I did not push the issue.    ASSESSMENT AND PLAN:  1. Mild, non-obstructive coronary artery disease    Continue aspirin 81 mg once daily.    Counseled on lifestyle modifications including regular exercise and trying to stick to a heart healthy Mediterranean style diet.   2. Hypertension    On triamterine-hydrochlorothiazide 37.5-25 mg once daily.and amlodipine 10 mg once daily.    BP is marginally controlled in the clinic today at 138/74, but was well-controlled at a recent visit with her primary. Recommended she get a home blood pressure cuff and check her blood pressure periodically with a goal of under 130/85.  3. COPD    Shortness of breath and cough somewhat improved. Scheduled for follow-up with pulmonology next week.  4. Obstructive sleep apnea    Thank you for the opportunity to participate in this very nice lady's care. She seems to be doing quite well from a cardiac standpoint and our visit today  was primarily focused on preventative measures. I gave her the option to follow up with Cardiology on an as needed basis and continue to follow with her primary care physician and Pulmonologist or to see Dr. Polo in routine follow up in about 1 year. I encouraged her to call with any questions or concerns in the meantime, and we would happy to see her sooner if needed.     VI Lopez, CNP   Text Page  (8am - 5pm, M-F)    Orders this Visit:  Orders Placed This Encounter   Procedures     Follow-Up with Cardiologist     No orders of the defined types were placed in this encounter.    Medications Discontinued During This Encounter   Medication Reason     order for DME Therapy completed     Encounter Diagnoses   Name Primary?     Coronary artery disease involving native coronary artery of native heart without angina pectoris Yes     Essential hypertension      CURRENT MEDICATIONS:  Current Outpatient Medications   Medication Sig Dispense Refill     amLODIPine (NORVASC) 10 MG tablet Take 1 tablet (10 mg) by mouth daily 30 tablet 1     aspirin (ASA) 81 MG EC tablet Take 1 tablet (81 mg) by mouth daily 30 tablet 11     guaiFENesin (MUCINEX) 600 MG 12 hr tablet Take 2 tablets (1,200 mg) by mouth 2 times daily 60 tablet 1     guaiFENesin-codeine (ROBITUSSIN AC) 100-10 MG/5ML SOLN Take 1 tsp. by mouth daily as needed        LORazepam (ATIVAN) 0.5 MG tablet Take 1 tablet (0.5 mg) by mouth every 4 hours as needed for anxiety 20 tablet 0     triamterene-HCTZ (DYAZIDE) 37.5-25 MG capsule Take 1 capsule by mouth daily       ALLERGIES   No Known Allergies    PAST MEDICAL, SURGICAL, FAMILY HISTORY:  History was reviewed and updated as needed, see medical record.    SOCIAL HISTORY:  She lives in [place]. She is a never smoker. She drinks alcohol in moderation, usually having around 3 drinks a week. No other drug use.    Review of Systems:  Skin:  Negative     Eyes:  Negative    ENT:  Negative    Respiratory:  Positive for  "dyspnea on exertion;shortness of breath;cough  Cardiovascular:  Negative    Gastroenterology: Negative    Genitourinary:  Negative    Musculoskeletal:  Negative    Neurologic:  Negative    Psychiatric:  Negative    Heme/Lymph/Imm:  Negative    Endocrine:  Negative       Physical Exam:  Vitals: /74   Pulse 87   Ht 1.638 m (5' 4.5\")   Wt 87.1 kg (192 lb)   SpO2 92%   BMI 32.45 kg/m      Wt Readings from Last 4 Encounters:   01/15/20 87.1 kg (192 lb)   12/27/19 84.7 kg (186 lb 11.2 oz)   01/26/19 86.2 kg (190 lb)   01/26/16 86.2 kg (190 lb)     GEN: Appears her stated age, well nourished, and in no acute distress.  HEENT: Pupils equal, round. Sclerae nonicteric.   C/V: Regular rate and rhythm, normal S1 and S2, no murmur, rub or gallop. 2+ radial and dorsalis pedis pulses bilaterally.  RESP: Respirations are unlabored. On O2 at 1.5 L NC. Lungs with poor air movement bilaterally, but no appreciable wheezing, rales, or rhonchi.  EXTREM: No clubbing, cyanosis, or lower extremity edema bilaterally.  NEURO: Alert and oriented, cooperative. Gait steady. No gross focal deficits.  SKIN: Warm and dry. The right radial site is soft, non-tender, and appears well healed with no ecchymosis or erythema.    Recent Lab Results:  LIPID RESULTS:  Lab Results   Component Value Date    CHOL 181 12/19/2019    HDL 98 12/19/2019    LDL 73 12/19/2019    TRIG 50 12/19/2019     LIVER ENZYME RESULTS:  Lab Results   Component Value Date    AST 34 12/19/2019    ALT 23 12/19/2019     CBC RESULTS:  Lab Results   Component Value Date    WBC 7.4 12/23/2019    RBC 4.80 12/23/2019    HGB 13.5 12/23/2019    HCT 43.8 12/23/2019    MCV 91 12/23/2019    MCH 28.1 12/23/2019    MCHC 30.8 (L) 12/23/2019    RDW 14.7 12/23/2019     12/23/2019     BMP RESULTS:  Lab Results   Component Value Date     01/15/2020    POTASSIUM 3.6 01/15/2020    CHLORIDE 96 (L) 01/15/2020    CO2 37 (H) 01/15/2020    ANIONGAP 8.6 01/15/2020     (H) " 01/15/2020    BUN 14 01/15/2020    CR 0.86 01/15/2020    GFRESTIMATED 64 01/15/2020    GFRESTBLACK 77 01/15/2020    TANISHA 10.0 01/15/2020      This note was completed in part using Dragon voice recognition software. Although reviewed after completion, some word and grammatical errors may occur.      Thank you for allowing me to participate in the care of your patient.      Sincerely,     Abner Morrell NP     [facility]    cc:   No referring provider defined for this encounter.

## 2020-01-15 NOTE — LETTER
1/15/2020    Damon Leon MD  [facility] [address] [place] [address]    RE: Nena Nelsone       Dear Colleague,    I had the pleasure of seeing Nena Sanders in the [facility].    Cardiology Clinic Progress Note    Service Date: January 15, 2020    PRIMARY CARDIOLOGIST: Dr. Polo      REASON FOR VISIT:  Hospital follow up    HPI:   I met Ms. Nena Sanders today. She is a very pleasant 79 year old female with a history of COPD, sleep apnea, oxygen dependent at night, pulmonary nodules, basal cell carcinoma, macular degeneration, hypertension, and non-obstructive coronary artery disease. She was admitted to [facility] on 12/17/2019  for worsening shortness of breath. She was seen by Dr. Polo in a cardiology consult for a troponin elevation. She was found to have an exacerbation of COPD with acute bronchitis and respiratory failure. Her troponin elevation was thought to be primarily due to demand in the setting of the COPD exacerbation. Troponin peaked at 3.7. An echocardiogram was obtained and was a technically difficult study due to poor endocardial delineation, but demonstrated normal left ventricular function with an ejection fraction of 60-65%.     She ultimately underwent coronary angiography on 12/23/2019 for definitive evaluation. This showed mild, non-obstructive coronary artery disease with no significant focal lesions. She was discharged on aspirin 81 mg once daily and amlodipine was added at 10 mg once daily for further blood pressure control. Her dyazide was continued at 37.5-25 mg once daily.     Today, she presents to the clinic in follow up of her hospital stay. She tells me that she has been feeling well since she has been home with improvement in her cough and shortness of breath. She had previously only been using oxygen during sleep but is now using oxygen all day, typically at 1.5 L by nasal cannula.  She denies concerns for chest pain, palpitations, dizziness, presyncope, or syncope. She has not had orthopnea, PND, or lower extremity edema. Her right wrist site from the angiogram has healed well without significant tenderness or bleeding. We reviewed the results of her basic metabolic panel drawn today showing stable kidney function and electrolytes. Her blood glucose was 143 but she was not fasting and had just eaten lunch a few minutes prior to the visit.     She had questions regarding continuing aspirin based on her coronary angiogram findings. We reviewed the indications for aspirin therapy. With the mild plaque noted on her angiogram, I think there is evidence of benefit for her in taking a baby aspirin for risk reduction. We did discuss the increased risk for bleeding with this as well. She has been tolerating aspirin well and was agreeable to continuing this. We also discussed the option of statin therapy. Her baseline LDL was at 73 during her hospital stay and she was not keen on starting a statin so I did not push the issue.    ASSESSMENT AND PLAN:  1. Mild, non-obstructive coronary artery disease    Continue aspirin 81 mg once daily.    Counseled on lifestyle modifications including regular exercise and trying to stick to a heart healthy Mediterranean style diet.   2. Hypertension    On triamterine-hydrochlorothiazide 37.5-25 mg once daily.and amlodipine 10 mg once daily.    BP is marginally controlled in the clinic today at 138/74, but was well-controlled at a recent visit with her primary. Recommended she get a home blood pressure cuff and check her blood pressure periodically with a goal of under 130/85.  3. COPD    Shortness of breath and cough somewhat improved. Scheduled for follow-up with pulmonology next week.  4. Obstructive sleep apnea    Thank you for the opportunity to participate in this very nice lady's care. She seems to be doing quite well from a cardiac standpoint and our visit today  was primarily focused on preventative measures. I gave her the option to follow up with Cardiology on an as needed basis and continue to follow with her primary care physician and Pulmonologist or to see Dr. Polo in routine follow up in about 1 year. I encouraged her to call with any questions or concerns in the meantime, and we would happy to see her sooner if needed.     VI Lopez, CNP   Text Page  (8am - 5pm, M-F)    Orders this Visit:  Orders Placed This Encounter   Procedures     Follow-Up with Cardiologist     No orders of the defined types were placed in this encounter.    Medications Discontinued During This Encounter   Medication Reason     order for DME Therapy completed     Encounter Diagnoses   Name Primary?     Coronary artery disease involving native coronary artery of native heart without angina pectoris Yes     Essential hypertension      CURRENT MEDICATIONS:  Current Outpatient Medications   Medication Sig Dispense Refill     amLODIPine (NORVASC) 10 MG tablet Take 1 tablet (10 mg) by mouth daily 30 tablet 1     aspirin (ASA) 81 MG EC tablet Take 1 tablet (81 mg) by mouth daily 30 tablet 11     guaiFENesin (MUCINEX) 600 MG 12 hr tablet Take 2 tablets (1,200 mg) by mouth 2 times daily 60 tablet 1     guaiFENesin-codeine (ROBITUSSIN AC) 100-10 MG/5ML SOLN Take 1 tsp. by mouth daily as needed        LORazepam (ATIVAN) 0.5 MG tablet Take 1 tablet (0.5 mg) by mouth every 4 hours as needed for anxiety 20 tablet 0     triamterene-HCTZ (DYAZIDE) 37.5-25 MG capsule Take 1 capsule by mouth daily       ALLERGIES   No Known Allergies    PAST MEDICAL, SURGICAL, FAMILY HISTORY:  History was reviewed and updated as needed, see medical record.    SOCIAL HISTORY:  She lives in [place]. She is a never smoker. She drinks alcohol in moderation, usually having around 3 drinks a week. No other drug use.    Review of Systems:  Skin:  Negative     Eyes:  Negative    ENT:  Negative    Respiratory:  Positive for  "dyspnea on exertion;shortness of breath;cough  Cardiovascular:  Negative    Gastroenterology: Negative    Genitourinary:  Negative    Musculoskeletal:  Negative    Neurologic:  Negative    Psychiatric:  Negative    Heme/Lymph/Imm:  Negative    Endocrine:  Negative       Physical Exam:  Vitals: /74   Pulse 87   Ht 1.638 m (5' 4.5\")   Wt 87.1 kg (192 lb)   SpO2 92%   BMI 32.45 kg/m      Wt Readings from Last 4 Encounters:   01/15/20 87.1 kg (192 lb)   12/27/19 84.7 kg (186 lb 11.2 oz)   01/26/19 86.2 kg (190 lb)   01/26/16 86.2 kg (190 lb)     GEN: Appears her stated age, well nourished, and in no acute distress.  HEENT: Pupils equal, round. Sclerae nonicteric.   C/V: Regular rate and rhythm, normal S1 and S2, no murmur, rub or gallop. 2+ radial and dorsalis pedis pulses bilaterally.  RESP: Respirations are unlabored. On O2 at 1.5 L NC. Lungs with poor air movement bilaterally, but no appreciable wheezing, rales, or rhonchi.  EXTREM: No clubbing, cyanosis, or lower extremity edema bilaterally.  NEURO: Alert and oriented, cooperative. Gait steady. No gross focal deficits.  SKIN: Warm and dry. The right radial site is soft, non-tender, and appears well healed with no ecchymosis or erythema.    Recent Lab Results:  LIPID RESULTS:  Lab Results   Component Value Date    CHOL 181 12/19/2019    HDL 98 12/19/2019    LDL 73 12/19/2019    TRIG 50 12/19/2019     LIVER ENZYME RESULTS:  Lab Results   Component Value Date    AST 34 12/19/2019    ALT 23 12/19/2019     CBC RESULTS:  Lab Results   Component Value Date    WBC 7.4 12/23/2019    RBC 4.80 12/23/2019    HGB 13.5 12/23/2019    HCT 43.8 12/23/2019    MCV 91 12/23/2019    MCH 28.1 12/23/2019    MCHC 30.8 (L) 12/23/2019    RDW 14.7 12/23/2019     12/23/2019     BMP RESULTS:  Lab Results   Component Value Date     01/15/2020    POTASSIUM 3.6 01/15/2020    CHLORIDE 96 (L) 01/15/2020    CO2 37 (H) 01/15/2020    ANIONGAP 8.6 01/15/2020     (H) " 01/15/2020    BUN 14 01/15/2020    CR 0.86 01/15/2020    GFRESTIMATED 64 01/15/2020    GFRESTBLACK 77 01/15/2020    TANISHA 10.0 01/15/2020      This note was completed in part using Dragon voice recognition software. Although reviewed after completion, some word and grammatical errors may occur.      Thank you for allowing me to participate in the care of your patient.    Sincerely,     Abner Morrell NP     [facility]

## 2020-01-15 NOTE — PATIENT INSTRUCTIONS
Thanks for coming into the [facility] today.    Today's plan:   1. Follow up with Cardiology on an as needed basis or see Dr. Polo in about 1 year if you would like for a routine check in.  2. Continue with your current medications.  3.   Continue to monitor your blood pressure periodically with  goal of under 130/85.   4.   Continue with regular follow up with your primary care provider and Pulmonology.     Results:   Your labs today looked good and stable in comparison to your last results.  Component      Latest Ref Rng & Units 1/15/2020   Sodium      136 - 145 mmol/L 138   Potassium      3.5 - 5.1 mmol/L 3.6   Chloride      98 - 107 mmol/L 96 (L)   Carbon Dioxide      23 - 29 mmol/L 37 (H)   Anion Gap      6 - 17 mmol/L 8.6   Glucose      70 - 105 mg/dL 143 (H)   Urea Nitrogen      7 - 30 mg/dL 14   Creatinine      0.70 - 1.30 mg/dL 0.86   GFR Estimate      >60 mL/min/1.73:m2 64   GFR Estimate If Black      >60 mL/min/1.73:m2 77   Calcium      8.5 - 10.5 mg/dL 10.0       If you have questions or concerns, please call my nurse at [phone].    Scheduling phone number: [phone]    Reminder: Please bring in all current medications, any over the counter supplements, and any vitamin bottles to your next appointment.    It was a pleasure seeing you today!     VI Lopez, CNP   01/15/20

## 2021-03-10 ENCOUNTER — OFFICE VISIT (OUTPATIENT)
Dept: CARDIOLOGY | Facility: CLINIC | Age: 81
End: 2021-03-10
Payer: MEDICARE

## 2021-03-10 VITALS
HEIGHT: 67 IN | OXYGEN SATURATION: 94 % | SYSTOLIC BLOOD PRESSURE: 135 MMHG | DIASTOLIC BLOOD PRESSURE: 76 MMHG | WEIGHT: 197.1 LBS | HEART RATE: 85 BPM | BODY MASS INDEX: 30.93 KG/M2

## 2021-03-10 DIAGNOSIS — I10 ESSENTIAL HYPERTENSION: ICD-10-CM

## 2021-03-10 DIAGNOSIS — G47.33 OBSTRUCTIVE SLEEP APNEA SYNDROME: ICD-10-CM

## 2021-03-10 DIAGNOSIS — J43.2 CENTRILOBULAR EMPHYSEMA (H): Primary | ICD-10-CM

## 2021-03-10 DIAGNOSIS — I25.10 CORONARY ARTERY DISEASE INVOLVING NATIVE CORONARY ARTERY OF NATIVE HEART WITHOUT ANGINA PECTORIS: ICD-10-CM

## 2021-03-10 LAB
ANION GAP SERPL CALCULATED.3IONS-SCNC: 4 MMOL/L (ref 3–14)
BUN SERPL-MCNC: 25 MG/DL (ref 7–30)
CALCIUM SERPL-MCNC: 9.6 MG/DL (ref 8.5–10.1)
CHLORIDE SERPL-SCNC: 100 MMOL/L (ref 94–109)
CO2 SERPL-SCNC: 35 MMOL/L (ref 20–32)
CREAT SERPL-MCNC: 0.89 MG/DL (ref 0.52–1.04)
GFR SERPL CREATININE-BSD FRML MDRD: 61 ML/MIN/{1.73_M2}
GLUCOSE SERPL-MCNC: 104 MG/DL (ref 70–99)
POTASSIUM SERPL-SCNC: 3.9 MMOL/L (ref 3.4–5.3)
SODIUM SERPL-SCNC: 139 MMOL/L (ref 133–144)

## 2021-03-10 PROCEDURE — 36415 COLL VENOUS BLD VENIPUNCTURE: CPT | Performed by: INTERNAL MEDICINE

## 2021-03-10 PROCEDURE — 99214 OFFICE O/P EST MOD 30 MIN: CPT | Performed by: INTERNAL MEDICINE

## 2021-03-10 PROCEDURE — 80048 BASIC METABOLIC PNL TOTAL CA: CPT | Performed by: INTERNAL MEDICINE

## 2021-03-10 RX ORDER — TRIAMTERENE AND HYDROCHLOROTHIAZIDE 37.5; 25 MG/1; MG/1
1 CAPSULE ORAL DAILY
Qty: 90 CAPSULE | Refills: 3 | Status: SHIPPED | OUTPATIENT
Start: 2021-03-10

## 2021-03-10 RX ORDER — AMLODIPINE BESYLATE 10 MG/1
10 TABLET ORAL DAILY
Qty: 90 TABLET | Refills: 3 | Status: SHIPPED | OUTPATIENT
Start: 2021-03-10

## 2021-03-10 RX ORDER — ALBUTEROL SULFATE 90 UG/1
2 AEROSOL, METERED RESPIRATORY (INHALATION) EVERY 6 HOURS PRN
COMMUNITY

## 2021-03-10 RX ORDER — IPRATROPIUM BROMIDE AND ALBUTEROL SULFATE 2.5; .5 MG/3ML; MG/3ML
1 SOLUTION RESPIRATORY (INHALATION) 2 TIMES DAILY
COMMUNITY

## 2021-03-10 ASSESSMENT — MIFFLIN-ST. JEOR: SCORE: 1401.67

## 2021-03-10 NOTE — LETTER
3/10/2021    Damon Leon MD  [address] [place] [address]    RE: Nena Sanders       Dear Colleague,    I had the pleasure of seeing Nena Sanders in the [facility].    HPI and Plan:   I met Ms. Nena Sanders today.     She is a very pleasant 80 year old female with a history of COPD, sleep apnea, oxygen dependent at night, pulmonary nodules, basal cell carcinoma, macular degeneration, hypertension, and non-obstructive coronary artery disease.     I met her in December 2019 when she was admitted with shortness of breath and had troponin elevation which in retrospect was due to hypoxia and acute exacerbation of COPD and respiratory failure.  The peak troponin was 3.7.  She had coronary angiography which revealed no significant obstructive disease.  She has been managed medically.      She returns for follow-up.  She has had a video visit with her pulmonologist.  She is oxygen dependent and uses 2 L during the daytime and 4 L at nighttime.  She has no cough or worsening shortness of breath.  She has gained some weight through the Covid pandemic.      She has hypertension is on amlodipine 10 mg daily and triamterene hydrochlorothiazide.  A BMP was drawn today but results are pending.  With amlodipine, she has noticed some leg edema.      Her echocardiogram as previously revealed normal ejection fraction without wall motion abnormalities.  She remains on baby aspirin.  She has not had any labs since 2020.    Her LDL in the past has been normal at 73 without any medications and she has been reluctant to take any statin.    Exam  Below.  There is trace ankle edema with superficial varicosities of the venous system       Impression    1.  Non obstructive coronary artery disease on angiogram in December 2019, continue aspirin, risk factor modification with blood pressure control.  Last LDL was at goal without any statin.   She has been reluctant to use this medication.      2.  COPD, oxygen dependent.  I encouraged her to follow-up with her primary care physician and pulmonologist.  She is going to make an appointment for in clinic visit with her pulmonologist.    3.  Hypertension, continue amlodipine and triamterene hydrochlorothiazide.  Her BMP was not available at the time of my visit.  Once it is available, we will call her with the results and make changes as necessary.  I told her that amlodipine may be contributing to some of her leg edema.  If it gets worse, she should call her primary care physician and they can potentially change the medication or decrease the dose.    4.  Venous insufficiency, she has some leg edema related to venous insufficiency.  I encouraged her to use compression stockings but she appears reluctant.    Overall, she stable from cardiac perspective.  Given no active cardiac issues, I offered her visit with me on a as needed basis and she prefers that.  She will continue follow with her primary care physician and pulmonology service.    Thank you for allowing us to part spent in care of this nice patient.    Sincerely,    Sanford Polo MD      No orders of the defined types were placed in this encounter.    Orders Placed This Encounter   Medications     fluticasone-salmeterol (ADVAIR) 250-50 MCG/DOSE inhaler     Sig: Inhale 1 puff into the lungs every 12 hours     ipratropium - albuterol 0.5 mg/2.5 mg/3 mL (DUONEB) 0.5-2.5 (3) MG/3ML neb solution     Sig: Take 1 vial by nebulization 2 times daily     albuterol (PROAIR HFA/PROVENTIL HFA/VENTOLIN HFA) 108 (90 Base) MCG/ACT inhaler     Sig: Inhale 2 puffs into the lungs every 6 hours as needed for shortness of breath / dyspnea or wheezing     Pharmacy may dispense brand covered by insurance (Proair, or proventil or ventolin or generic albuterol inhaler)     triamterene-HCTZ (DYAZIDE) 37.5-25 MG capsule     Sig: Take 1 capsule by mouth daily     Dispense:  90  capsule     Refill:  3     amLODIPine (NORVASC) 10 MG tablet     Sig: Take 1 tablet (10 mg) by mouth daily     Dispense:  90 tablet     Refill:  3     Medications Discontinued During This Encounter   Medication Reason     triamterene-HCTZ (DYAZIDE) 37.5-25 MG capsule Reorder     amLODIPine (NORVASC) 10 MG tablet Reorder       Encounter Diagnoses   Name Primary?     Coronary artery disease involving native coronary artery of native heart without angina pectoris      Essential hypertension      Centrilobular emphysema (H) Yes     Obstructive sleep apnea syndrome        CURRENT MEDICATIONS:  Current Outpatient Medications   Medication Sig Dispense Refill     albuterol (PROAIR HFA/PROVENTIL HFA/VENTOLIN HFA) 108 (90 Base) MCG/ACT inhaler Inhale 2 puffs into the lungs every 6 hours as needed for shortness of breath / dyspnea or wheezing       amLODIPine (NORVASC) 10 MG tablet Take 1 tablet (10 mg) by mouth daily 90 tablet 3     aspirin (ASA) 81 MG EC tablet Take 1 tablet (81 mg) by mouth daily 30 tablet 11     fluticasone-salmeterol (ADVAIR) 250-50 MCG/DOSE inhaler Inhale 1 puff into the lungs every 12 hours       guaiFENesin (MUCINEX) 600 MG 12 hr tablet Take 2 tablets (1,200 mg) by mouth 2 times daily 60 tablet 1     guaiFENesin-codeine (ROBITUSSIN AC) 100-10 MG/5ML SOLN Take 1 tsp. by mouth daily as needed        ipratropium - albuterol 0.5 mg/2.5 mg/3 mL (DUONEB) 0.5-2.5 (3) MG/3ML neb solution Take 1 vial by nebulization 2 times daily       LORazepam (ATIVAN) 0.5 MG tablet Take 1 tablet (0.5 mg) by mouth every 4 hours as needed for anxiety 20 tablet 0     triamterene-HCTZ (DYAZIDE) 37.5-25 MG capsule Take 1 capsule by mouth daily 90 capsule 3       ALLERGIES   No Known Allergies    PAST MEDICAL HISTORY:  Past Medical History:   Diagnosis Date     Arthritis     hip     Basal cell carcinoma      CAD (coronary artery disease)     12/23/19 Cath: nonobstructive disease     Chronic obstructive lung disease (H)       Chronic rhinitis      COPD (chronic obstructive pulmonary disease) (H)      HTN (hypertension)      Macular degeneration     wet     Nocturnal hypoxia      NSTEMI (non-ST elevated myocardial infarction) (H) 12/17/2019    Added automatically from request for surgery [id number]     Osteopenia      Oxygen dependent     4L/nc at night and as needed during the day     Pulmonary nodules      Radius fracture      Sleep apnea     does not use cpap       PAST SURGICAL HISTORY:  Past Surgical History:   Procedure Laterality Date     ARTHROPLASTY HIP ANTERIOR Right 12/16/2015    Procedure: ARTHROPLASTY HIP ANTERIOR;  Surgeon: Afl Rivera MD;  Location:  OR     ARTHROPLASTY HIP ANTERIOR Left 1/26/2016    Procedure: ARTHROPLASTY HIP ANTERIOR;  Surgeon: Alf Rivera MD;  Location:  OR     COLONOSCOPY       CV CORONARY ANGIOGRAM N/A 12/23/2019    Procedure: Coronary Angiogram;  Surgeon: Len Anthony MD;  Location:  HEART CARDIAC CATH LAB     EYE SURGERY      bilat cataracts     Pr punc/aspir breast cyst benign       TONSILLECTOMY      AGE 11       FAMILY HISTORY:  Family History   Problem Relation Age of Onset     Emphysema Mother      Emphysema Father        SOCIAL HISTORY:  Social History     Socioeconomic History     Marital status: Single     Spouse name: None     Number of children: None     Years of education: None     Highest education level: None   Occupational History     None   Social Needs     Financial resource strain: None     Food insecurity     Worry: None     Inability: None     Transportation needs     Medical: None     Non-medical: None   Tobacco Use     Smoking status: Never Smoker     Smokeless tobacco: Never Used   Substance and Sexual Activity     Alcohol use: Yes     Comment: 3 DRINKS A WEEK     Drug use: No     Sexual activity: None   Lifestyle     Physical activity     Days per week: None     Minutes per session: None     Stress: None   Relationships     Social connections     Talks  "on phone: None     Gets together: None     Attends Spiritism service: None     Active member of club or organization: None     Attends meetings of clubs or organizations: None     Relationship status: None     Intimate partner violence     Fear of current or ex partner: None     Emotionally abused: None     Physically abused: None     Forced sexual activity: None   Other Topics Concern     Parent/sibling w/ CABG, MI or angioplasty before 65F 55M? Not Asked   Social History Narrative     None       Review of Systems:  Skin:  Negative     Eyes:  Positive for    ENT:  Negative    Respiratory:  Positive for dyspnea on exertion;shortness of breath;cough;sleep apnea  Cardiovascular:  Negative;palpitations;chest pain;dizziness;lightheadedness;syncope or near-syncope;cyanosis Positive for;fatigue;edema  Gastroenterology: Negative    Genitourinary:  Positive for urinary frequency;nocturia  Musculoskeletal:  Positive for back pain  Neurologic:  Negative    Psychiatric:  Negative    Heme/Lymph/Imm:  Negative    Endocrine:  Negative      Physical Exam:  Vitals: /76   Pulse 85   Ht 1.702 m (5' 7\")   Wt 89.4 kg (197 lb 1.6 oz)   SpO2 94%   BMI 30.87 kg/m      Constitutional:  in no acute distress        Skin:  warm and dry to the touch          Head:           Eyes:  sclera white        Lymph:      ENT:           Neck:  JVP normal        Respiratory:  clear to auscultation         Cardiac: regular rhythm;normal S1 and S2     no presence of murmur                                                   GI:  not assessed this visit        Extremities and Muscular Skeletal:  Trace ankle edema, superficial varicosities    Neurological:  no gross motor deficits        Psych:  Alert and Oriented x 3      Recent Lab Results:  LIPID RESULTS:  Lab Results   Component Value Date    CHOL 181 12/19/2019    HDL 98 12/19/2019    LDL 73 12/19/2019    TRIG 50 12/19/2019       LIVER ENZYME RESULTS:  Lab Results   Component Value Date    AST " 34 12/19/2019    ALT 23 12/19/2019       CBC RESULTS:  Lab Results   Component Value Date    WBC 7.4 12/23/2019    RBC 4.80 12/23/2019    HGB 13.5 12/23/2019    HCT 43.8 12/23/2019    MCV 91 12/23/2019    MCH 28.1 12/23/2019    MCHC 30.8 (L) 12/23/2019    RDW 14.7 12/23/2019     12/23/2019       BMP RESULTS:  Lab Results   Component Value Date     01/15/2020    POTASSIUM 3.6 01/15/2020    CHLORIDE 96 (L) 01/15/2020    CO2 37 (H) 01/15/2020    ANIONGAP 8.6 01/15/2020     (H) 01/15/2020    BUN 14 01/15/2020    CR 0.86 01/15/2020    GFRESTIMATED 64 01/15/2020    GFRESTBLACK 77 01/15/2020    TANISHA 10.0 01/15/2020        A1C RESULTS:  No results found for: A1C    INR RESULTS:  Lab Results   Component Value Date    INR 1.00 12/19/2019         CC  Abner Morrell NP  [address] [id number]            Thank you for allowing me to participate in the care of your patient.      Sincerely,     Sanford Polo MD     [facility]    cc:   Abner Morrell NP  [address] [id number]

## 2021-03-10 NOTE — PROGRESS NOTES
HPI and Plan:   I met Ms. Nena Sanders today.     She is a very pleasant 80 year old female with a history of COPD, sleep apnea, oxygen dependent at night, pulmonary nodules, basal cell carcinoma, macular degeneration, hypertension, and non-obstructive coronary artery disease.     I met her in December 2019 when she was admitted with shortness of breath and had troponin elevation which in retrospect was due to hypoxia and acute exacerbation of COPD and respiratory failure.  The peak troponin was 3.7.  She had coronary angiography which revealed no significant obstructive disease.  She has been managed medically.      She returns for follow-up.  She has had a video visit with her pulmonologist.  She is oxygen dependent and uses 2 L during the daytime and 4 L at nighttime.  She has no cough or worsening shortness of breath.  She has gained some weight through the Covid pandemic.      She has hypertension is on amlodipine 10 mg daily and triamterene hydrochlorothiazide.  A BMP was drawn today but results are pending.  With amlodipine, she has noticed some leg edema.      Her echocardiogram as previously revealed normal ejection fraction without wall motion abnormalities.  She remains on baby aspirin.  She has not had any labs since 2020.    Her LDL in the past has been normal at 73 without any medications and she has been reluctant to take any statin.    Exam  Below.  There is trace ankle edema with superficial varicosities of the venous system       Impression    1.  Non obstructive coronary artery disease on angiogram in December 2019, continue aspirin, risk factor modification with blood pressure control.  Last LDL was at goal without any statin.  She has been reluctant to use this medication.      2.  COPD, oxygen dependent.  I encouraged her to follow-up with her primary care physician and pulmonologist.  She is going to make an appointment for in clinic visit with her pulmonologist.    3.  Hypertension,  continue amlodipine and triamterene hydrochlorothiazide.  Her BMP was not available at the time of my visit.  Once it is available, we will call her with the results and make changes as necessary.  I told her that amlodipine may be contributing to some of her leg edema.  If it gets worse, she should call her primary care physician and they can potentially change the medication or decrease the dose.    4.  Venous insufficiency, she has some leg edema related to venous insufficiency.  I encouraged her to use compression stockings but she appears reluctant.    Overall, she stable from cardiac perspective.  Given no active cardiac issues, I offered her visit with me on a as needed basis and she prefers that.  She will continue follow with her primary care physician and pulmonology service.    Thank you for allowing us to part spent in care of this nice patient.    Sincerely,    Sanford Polo MD      No orders of the defined types were placed in this encounter.    Orders Placed This Encounter   Medications     fluticasone-salmeterol (ADVAIR) 250-50 MCG/DOSE inhaler     Sig: Inhale 1 puff into the lungs every 12 hours     ipratropium - albuterol 0.5 mg/2.5 mg/3 mL (DUONEB) 0.5-2.5 (3) MG/3ML neb solution     Sig: Take 1 vial by nebulization 2 times daily     albuterol (PROAIR HFA/PROVENTIL HFA/VENTOLIN HFA) 108 (90 Base) MCG/ACT inhaler     Sig: Inhale 2 puffs into the lungs every 6 hours as needed for shortness of breath / dyspnea or wheezing     Pharmacy may dispense brand covered by insurance (Proair, or proventil or ventolin or generic albuterol inhaler)     triamterene-HCTZ (DYAZIDE) 37.5-25 MG capsule     Sig: Take 1 capsule by mouth daily     Dispense:  90 capsule     Refill:  3     amLODIPine (NORVASC) 10 MG tablet     Sig: Take 1 tablet (10 mg) by mouth daily     Dispense:  90 tablet     Refill:  3     Medications Discontinued During This Encounter   Medication Reason     triamterene-HCTZ (DYAZIDE) 37.5-25 MG  capsule Reorder     amLODIPine (NORVASC) 10 MG tablet Reorder       Encounter Diagnoses   Name Primary?     Coronary artery disease involving native coronary artery of native heart without angina pectoris      Essential hypertension      Centrilobular emphysema (H) Yes     Obstructive sleep apnea syndrome        CURRENT MEDICATIONS:  Current Outpatient Medications   Medication Sig Dispense Refill     albuterol (PROAIR HFA/PROVENTIL HFA/VENTOLIN HFA) 108 (90 Base) MCG/ACT inhaler Inhale 2 puffs into the lungs every 6 hours as needed for shortness of breath / dyspnea or wheezing       amLODIPine (NORVASC) 10 MG tablet Take 1 tablet (10 mg) by mouth daily 90 tablet 3     aspirin (ASA) 81 MG EC tablet Take 1 tablet (81 mg) by mouth daily 30 tablet 11     fluticasone-salmeterol (ADVAIR) 250-50 MCG/DOSE inhaler Inhale 1 puff into the lungs every 12 hours       guaiFENesin (MUCINEX) 600 MG 12 hr tablet Take 2 tablets (1,200 mg) by mouth 2 times daily 60 tablet 1     guaiFENesin-codeine (ROBITUSSIN AC) 100-10 MG/5ML SOLN Take 1 tsp. by mouth daily as needed        ipratropium - albuterol 0.5 mg/2.5 mg/3 mL (DUONEB) 0.5-2.5 (3) MG/3ML neb solution Take 1 vial by nebulization 2 times daily       LORazepam (ATIVAN) 0.5 MG tablet Take 1 tablet (0.5 mg) by mouth every 4 hours as needed for anxiety 20 tablet 0     triamterene-HCTZ (DYAZIDE) 37.5-25 MG capsule Take 1 capsule by mouth daily 90 capsule 3       ALLERGIES   No Known Allergies    PAST MEDICAL HISTORY:  Past Medical History:   Diagnosis Date     Arthritis     hip     Basal cell carcinoma      CAD (coronary artery disease)     12/23/19 Cath: nonobstructive disease     Chronic obstructive lung disease (H)      Chronic rhinitis      COPD (chronic obstructive pulmonary disease) (H)      HTN (hypertension)      Macular degeneration     wet     Nocturnal hypoxia      NSTEMI (non-ST elevated myocardial infarction) (H) 12/17/2019    Added automatically from request for surgery  [id number]     Osteopenia      Oxygen dependent     4L/nc at night and as needed during the day     Pulmonary nodules      Radius fracture      Sleep apnea     does not use cpap       PAST SURGICAL HISTORY:  Past Surgical History:   Procedure Laterality Date     ARTHROPLASTY HIP ANTERIOR Right 12/16/2015    Procedure: ARTHROPLASTY HIP ANTERIOR;  Surgeon: Alf Rivera MD;  Location:  OR     ARTHROPLASTY HIP ANTERIOR Left 1/26/2016    Procedure: ARTHROPLASTY HIP ANTERIOR;  Surgeon: Alf Rivera MD;  Location:  OR     COLONOSCOPY       CV CORONARY ANGIOGRAM N/A 12/23/2019    Procedure: Coronary Angiogram;  Surgeon: Len Anthony MD;  Location:  [facility]     EYE SURGERY      bilat cataracts     Pr punc/aspir breast cyst benign       TONSILLECTOMY      AGE 11       FAMILY HISTORY:  Family History   Problem Relation Age of Onset     Emphysema Mother      Emphysema Father        SOCIAL HISTORY:  Social History     Socioeconomic History     Marital status: Single     Spouse name: None     Number of children: None     Years of education: None     Highest education level: None   Occupational History     None   Social Needs     Financial resource strain: None     Food insecurity     Worry: None     Inability: None     Transportation needs     Medical: None     Non-medical: None   Tobacco Use     Smoking status: Never Smoker     Smokeless tobacco: Never Used   Substance and Sexual Activity     Alcohol use: Yes     Comment: 3 DRINKS A WEEK     Drug use: No     Sexual activity: None   Lifestyle     Physical activity     Days per week: None     Minutes per session: None     Stress: None   Relationships     Social connections     Talks on phone: None     Gets together: None     Attends [employer] service: None     Active member of club or organization: None     Attends meetings of clubs or organizations: None     Relationship status: None     Intimate partner violence     Fear of current or ex  "partner: None     Emotionally abused: None     Physically abused: None     Forced sexual activity: None   Other Topics Concern     Parent/sibling w/ CABG, MI or angioplasty before 65F 55M? Not Asked   Social History Narrative     None       Review of Systems:  Skin:  Negative     Eyes:  Positive for    ENT:  Negative    Respiratory:  Positive for dyspnea on exertion;shortness of breath;cough;sleep apnea  Cardiovascular:  Negative;palpitations;chest pain;dizziness;lightheadedness;syncope or near-syncope;cyanosis Positive for;fatigue;edema  Gastroenterology: Negative    Genitourinary:  Positive for urinary frequency;nocturia  Musculoskeletal:  Positive for back pain  Neurologic:  Negative    Psychiatric:  Negative    Heme/Lymph/Imm:  Negative    Endocrine:  Negative      Physical Exam:  Vitals: /76   Pulse 85   Ht 1.702 m (5' 7\")   Wt 89.4 kg (197 lb 1.6 oz)   SpO2 94%   BMI 30.87 kg/m      Constitutional:  in no acute distress        Skin:  warm and dry to the touch          Head:           Eyes:  sclera white        Lymph:      ENT:           Neck:  JVP normal        Respiratory:  clear to auscultation         Cardiac: regular rhythm;normal S1 and S2     no presence of murmur                                                   GI:  not assessed this visit        Extremities and Muscular Skeletal:  Trace ankle edema, superficial varicosities    Neurological:  no gross motor deficits        Psych:  Alert and Oriented x 3      Recent Lab Results:  LIPID RESULTS:  Lab Results   Component Value Date    CHOL 181 12/19/2019    HDL 98 12/19/2019    LDL 73 12/19/2019    TRIG 50 12/19/2019       LIVER ENZYME RESULTS:  Lab Results   Component Value Date    AST 34 12/19/2019    ALT 23 12/19/2019       CBC RESULTS:  Lab Results   Component Value Date    WBC 7.4 12/23/2019    RBC 4.80 12/23/2019    HGB 13.5 12/23/2019    HCT 43.8 12/23/2019    MCV 91 12/23/2019    MCH 28.1 12/23/2019    MCHC 30.8 (L) 12/23/2019    RDW " 14.7 12/23/2019     12/23/2019       BMP RESULTS:  Lab Results   Component Value Date     01/15/2020    POTASSIUM 3.6 01/15/2020    CHLORIDE 96 (L) 01/15/2020    CO2 37 (H) 01/15/2020    ANIONGAP 8.6 01/15/2020     (H) 01/15/2020    BUN 14 01/15/2020    CR 0.86 01/15/2020    GFRESTIMATED 64 01/15/2020    GFRESTBLACK 77 01/15/2020    TANISHA 10.0 01/15/2020        A1C RESULTS:  No results found for: A1C    INR RESULTS:  Lab Results   Component Value Date    INR 1.00 12/19/2019         TOPHER Morrell, MULUGETA  [id number] [id number]

## 2021-03-11 ENCOUNTER — TELEPHONE (OUTPATIENT)
Dept: CARDIOLOGY | Facility: CLINIC | Age: 81
End: 2021-03-11

## 2021-03-11 NOTE — TELEPHONE ENCOUNTER
,  Call out to Pt to discuss labs.   Anisa Hernandez MD  [facility] Heart Team 6             Call pt w results. K and crea normal. Bicarb slightly high,this is chronic and infact better than prior results. This is likely from her COPd causing CO2 retention.      Component      Latest Ref Rng & Units 3/10/2021   Sodium      133 - 144 mmol/L 139   Potassium      3.4 - 5.3 mmol/L 3.9   Chloride      94 - 109 mmol/L 100   Carbon Dioxide      20 - 32 mmol/L 35 (H)   Anion Gap      3 - 14 mmol/L 4   Glucose      70 - 99 mg/dL 104 (H)   Urea Nitrogen      7 - 30 mg/dL 25   Creatinine      0.52 - 1.04 mg/dL 0.89   GFR Estimate      >60 mL/min/1.73:m2 61   GFR Estimate If Black      >60 mL/min/1.73:m2 71   Calcium      8.5 - 10.1 mg/dL 9.6

## 2021-03-12 NOTE — TELEPHONE ENCOUNTER
Patient returned call and RN advised patient of lab results and Dr. Polo's review. Patient verbalized understanding and has no further questions at this time.

## 2023-03-06 ENCOUNTER — TRANSCRIBE ORDERS (OUTPATIENT)
Dept: OTHER | Age: 83
End: 2023-03-06

## 2023-03-06 DIAGNOSIS — J44.9 COPD, SEVERE (H): Primary | ICD-10-CM

## 2023-04-25 ENCOUNTER — HOSPITAL ENCOUNTER (OUTPATIENT)
Dept: CARDIAC REHAB | Facility: CLINIC | Age: 83
Discharge: HOME | End: 2023-04-25
Attending: INTERNAL MEDICINE
Payer: MEDICARE

## 2023-04-25 PROCEDURE — 94626 PHY/QHP OP PULM RHB W/MNTR: CPT | Performed by: CLINICAL EXERCISE PHYSIOLOGIST

## 2023-11-12 ENCOUNTER — APPOINTMENT (OUTPATIENT)
Dept: GENERAL RADIOLOGY | Facility: CLINIC | Age: 83
End: 2023-11-12
Attending: STUDENT IN AN ORGANIZED HEALTH CARE EDUCATION/TRAINING PROGRAM
Payer: MEDICARE

## 2023-11-12 ENCOUNTER — HOSPITAL ENCOUNTER (EMERGENCY)
Facility: CLINIC | Age: 83
Discharge: HOME | End: 2023-11-12
Attending: STUDENT IN AN ORGANIZED HEALTH CARE EDUCATION/TRAINING PROGRAM | Admitting: STUDENT IN AN ORGANIZED HEALTH CARE EDUCATION/TRAINING PROGRAM
Payer: MEDICARE

## 2023-11-12 VITALS
RESPIRATION RATE: 16 BRPM | SYSTOLIC BLOOD PRESSURE: 150 MMHG | DIASTOLIC BLOOD PRESSURE: 74 MMHG | TEMPERATURE: 98.5 F | BODY MASS INDEX: 30.32 KG/M2 | OXYGEN SATURATION: 95 % | HEART RATE: 107 BPM | HEIGHT: 65 IN | WEIGHT: 182 LBS

## 2023-11-12 DIAGNOSIS — J44.1 COPD WITH ACUTE EXACERBATION (H): Primary | ICD-10-CM

## 2023-11-12 DIAGNOSIS — R06.02 SHORTNESS OF BREATH: ICD-10-CM

## 2023-11-12 PROBLEM — I47.19 ATRIAL TACHYCARDIA (H): Status: ACTIVE | Noted: 2022-10-28

## 2023-11-12 PROBLEM — Z85.828 HISTORY OF SCC (SQUAMOUS CELL CARCINOMA) OF SKIN: Status: ACTIVE | Noted: 2021-11-29

## 2023-11-12 PROBLEM — Z85.828 HISTORY OF BASAL CELL CARCINOMA: Status: ACTIVE | Noted: 2018-01-14

## 2023-11-12 LAB
ANION GAP SERPL CALCULATED.3IONS-SCNC: 9 MMOL/L (ref 7–15)
ATRIAL RATE - MUSE: 92 BPM
BASOPHILS # BLD AUTO: 0 10E3/UL (ref 0–0.2)
BASOPHILS NFR BLD AUTO: 0 %
BUN SERPL-MCNC: 19.1 MG/DL (ref 8–23)
CALCIUM SERPL-MCNC: 9.9 MG/DL (ref 8.8–10.2)
CHLORIDE SERPL-SCNC: 95 MMOL/L (ref 98–107)
CREAT SERPL-MCNC: 0.72 MG/DL (ref 0.51–0.95)
DEPRECATED HCO3 PLAS-SCNC: 32 MMOL/L (ref 22–29)
DIASTOLIC BLOOD PRESSURE - MUSE: NORMAL MMHG
EGFRCR SERPLBLD CKD-EPI 2021: 83 ML/MIN/1.73M2
EOSINOPHIL # BLD AUTO: 0 10E3/UL (ref 0–0.7)
EOSINOPHIL NFR BLD AUTO: 0 %
ERYTHROCYTE [DISTWIDTH] IN BLOOD BY AUTOMATED COUNT: 16.2 % (ref 10–15)
FLUAV RNA SPEC QL NAA+PROBE: NEGATIVE
FLUBV RNA RESP QL NAA+PROBE: NEGATIVE
GLUCOSE SERPL-MCNC: 137 MG/DL (ref 70–99)
HCT VFR BLD AUTO: 39.8 % (ref 35–47)
HGB BLD-MCNC: 12.2 G/DL (ref 11.7–15.7)
HOLD SPECIMEN: NORMAL
IMM GRANULOCYTES # BLD: 0 10E3/UL
IMM GRANULOCYTES NFR BLD: 0 %
INTERPRETATION ECG - MUSE: NORMAL
LYMPHOCYTES # BLD AUTO: 0.6 10E3/UL (ref 0.8–5.3)
LYMPHOCYTES NFR BLD AUTO: 7 %
MCH RBC QN AUTO: 26.5 PG (ref 26.5–33)
MCHC RBC AUTO-ENTMCNC: 30.7 G/DL (ref 31.5–36.5)
MCV RBC AUTO: 86 FL (ref 78–100)
MONOCYTES # BLD AUTO: 0.6 10E3/UL (ref 0–1.3)
MONOCYTES NFR BLD AUTO: 6 %
NEUTROPHILS # BLD AUTO: 8.4 10E3/UL (ref 1.6–8.3)
NEUTROPHILS NFR BLD AUTO: 87 %
NRBC # BLD AUTO: 0 10E3/UL
NRBC BLD AUTO-RTO: 0 /100
NT-PROBNP SERPL-MCNC: 546 PG/ML (ref 0–1800)
P AXIS - MUSE: 92 DEGREES
PLATELET # BLD AUTO: 301 10E3/UL (ref 150–450)
POTASSIUM SERPL-SCNC: 4.3 MMOL/L (ref 3.4–5.3)
PR INTERVAL - MUSE: 122 MS
QRS DURATION - MUSE: 94 MS
QT - MUSE: 328 MS
QTC - MUSE: 405 MS
R AXIS - MUSE: 77 DEGREES
RBC # BLD AUTO: 4.61 10E6/UL (ref 3.8–5.2)
RSV RNA SPEC NAA+PROBE: NEGATIVE
SARS-COV-2 RNA RESP QL NAA+PROBE: NEGATIVE
SODIUM SERPL-SCNC: 136 MMOL/L (ref 135–145)
SYSTOLIC BLOOD PRESSURE - MUSE: NORMAL MMHG
T AXIS - MUSE: 52 DEGREES
TROPONIN T SERPL HS-MCNC: 14 NG/L
VENTRICULAR RATE- MUSE: 92 BPM
WBC # BLD AUTO: 9.7 10E3/UL (ref 4–11)

## 2023-11-12 PROCEDURE — 80048 BASIC METABOLIC PNL TOTAL CA: CPT | Performed by: STUDENT IN AN ORGANIZED HEALTH CARE EDUCATION/TRAINING PROGRAM

## 2023-11-12 PROCEDURE — 85025 COMPLETE CBC W/AUTO DIFF WBC: CPT | Performed by: EMERGENCY MEDICINE

## 2023-11-12 PROCEDURE — 36415 COLL VENOUS BLD VENIPUNCTURE: CPT | Performed by: EMERGENCY MEDICINE

## 2023-11-12 PROCEDURE — 83880 ASSAY OF NATRIURETIC PEPTIDE: CPT | Performed by: STUDENT IN AN ORGANIZED HEALTH CARE EDUCATION/TRAINING PROGRAM

## 2023-11-12 PROCEDURE — 71046 X-RAY EXAM CHEST 2 VIEWS: CPT

## 2023-11-12 PROCEDURE — 87637 SARSCOV2&INF A&B&RSV AMP PRB: CPT | Performed by: EMERGENCY MEDICINE

## 2023-11-12 PROCEDURE — 85025 COMPLETE CBC W/AUTO DIFF WBC: CPT | Performed by: STUDENT IN AN ORGANIZED HEALTH CARE EDUCATION/TRAINING PROGRAM

## 2023-11-12 PROCEDURE — 84484 ASSAY OF TROPONIN QUANT: CPT | Performed by: STUDENT IN AN ORGANIZED HEALTH CARE EDUCATION/TRAINING PROGRAM

## 2023-11-12 PROCEDURE — 250N000009 HC RX 250: Performed by: STUDENT IN AN ORGANIZED HEALTH CARE EDUCATION/TRAINING PROGRAM

## 2023-11-12 PROCEDURE — 87637 SARSCOV2&INF A&B&RSV AMP PRB: CPT | Performed by: STUDENT IN AN ORGANIZED HEALTH CARE EDUCATION/TRAINING PROGRAM

## 2023-11-12 PROCEDURE — 99285 EMERGENCY DEPT VISIT HI MDM: CPT | Mod: 25

## 2023-11-12 PROCEDURE — 93005 ELECTROCARDIOGRAM TRACING: CPT | Mod: RTG

## 2023-11-12 PROCEDURE — 94640 AIRWAY INHALATION TREATMENT: CPT

## 2023-11-12 PROCEDURE — 80048 BASIC METABOLIC PNL TOTAL CA: CPT | Performed by: EMERGENCY MEDICINE

## 2023-11-12 RX ORDER — PREDNISONE 20 MG/1
TABLET ORAL
Qty: 10 TABLET | Refills: 0 | Status: SHIPPED | OUTPATIENT
Start: 2023-11-12

## 2023-11-12 RX ORDER — DOXYCYCLINE 100 MG/1
100 CAPSULE ORAL 2 TIMES DAILY
Qty: 14 CAPSULE | Refills: 0 | Status: SHIPPED | OUTPATIENT
Start: 2023-11-12 | End: 2023-11-19

## 2023-11-12 RX ORDER — IPRATROPIUM BROMIDE AND ALBUTEROL SULFATE 2.5; .5 MG/3ML; MG/3ML
3 SOLUTION RESPIRATORY (INHALATION) ONCE
Status: COMPLETED | OUTPATIENT
Start: 2023-11-12 | End: 2023-11-12

## 2023-11-12 RX ADMIN — IPRATROPIUM BROMIDE AND ALBUTEROL SULFATE 3 ML: .5; 3 SOLUTION RESPIRATORY (INHALATION) at 17:49

## 2023-11-12 ASSESSMENT — ACTIVITIES OF DAILY LIVING (ADL): ADLS_ACUITY_SCORE: 35

## 2023-11-12 NOTE — DISCHARGE INSTRUCTIONS
Thank you for allowing us to evaluate you today.  Follow up with your primary care clinician  in 1 week for reevaluation..  Take the antibiotic(s) as prescribed.   Take the steroid as prescribed.  You can use your DuoNeb's up to every 4 hours as needed for shortness of breath. Continue using your O2.   Please read the guidance provided with your discharge instructions.  Immediately return to the emergency department with any concerns.

## 2023-11-13 NOTE — ED PROVIDER NOTES
"History   Chief Complaint:  Shortness of Breath       HPI:  Nena Sanders is a very pleasant 83 year old female presenting with shortness of breath.  Patient has a significant history of COPD and uses 2 L of oxygen by nasal cannula during the day at home and 4 L at night.  Over the last few days, she has had increased shortness of breath and cough.  Her ophthalmologist has prescribed prednisone and levofloxacin for her in the past so these are available as needed.  She did stop taking these yesterday.  She tested negative for COVID at home.  She has no chest pain.  She denies fever or chills.  She did use DuoNebs at home along with Advair.  She has been using DuoNebs twice a day.  She does have a history of CAD, hypertension and NSTEMI in addition to COPD.        Independent Historian:  None. Only the patient provided history.    Review of External Notes:  None.    I personally reviewed the patient's chart, including available medication list and available past medical history, past surgical history, family history, and social history.    Physical Exam   Patient Vitals for the past 24 hrs:   BP Temp Temp src Pulse Resp SpO2 Height Weight   11/12/23 1346 (!) 150/74 98.5  F (36.9  C) Temporal 107 16 95 % 1.651 m (5' 5\") 82.6 kg (182 lb)      Physical Exam  Vitals and nursing note reviewed.   Constitutional:       General: She is not in acute distress.     Appearance: Normal appearance. She is not diaphoretic.   Eyes:      General: No scleral icterus.     Conjunctiva/sclera: Conjunctivae normal.   Cardiovascular:      Rate and Rhythm: Normal rate and regular rhythm.      Heart sounds: Normal heart sounds.   Pulmonary:      Effort: Pulmonary effort is normal. No tachypnea, accessory muscle usage or respiratory distress.      Breath sounds: Wheezing present. No rhonchi or rales.      Comments: Prolonged expiratory phase throughout lung  Abdominal:      General: Abdomen is flat.   Musculoskeletal:      Right lower " leg: No tenderness. No edema.      Left lower leg: No tenderness. No edema.   Skin:     General: Skin is warm and dry.      Findings: No rash.   Neurological:      Mental Status: She is alert.           Emergency Department Course     Imaging & ECG: Laboratory:   ECG results from 11/12/23   EKG 12-lead, tracing only     Value    Systolic Blood Pressure     Diastolic Blood Pressure     Ventricular Rate 92    Atrial Rate 92    UT Interval 122    QRS Duration 94        QTc 405    P Axis 92    R AXIS 77    T Axis 52    Interpretation ECG      ** Poor data quality, interpretation may be adversely affected  Sinus rhythm with Premature supraventricular complexes  Nonspecific ST abnormality  Abnormal ECG  When compared with ECG of 18-DEC-2019 05:06,  QT has shortened  Confirmed by GENERATED REPORT, COMPUTER ([id number]),  Lynda Armando ([id number]) on 11/12/2023 3:57:09 PM          XR Chest 2 Views   Final Result   IMPRESSION: Negative chest.         Report per radiology. Labs Ordered and Resulted from Time of ED Arrival to Time of ED Departure   BASIC METABOLIC PANEL - Abnormal       Result Value    Sodium 136      Potassium 4.3      Chloride 95 (*)     Carbon Dioxide (CO2) 32 (*)     Anion Gap 9      Urea Nitrogen 19.1      Creatinine 0.72      GFR Estimate 83      Calcium 9.9      Glucose 137 (*)    CBC WITH PLATELETS AND DIFFERENTIAL - Abnormal    WBC Count 9.7      RBC Count 4.61      Hemoglobin 12.2      Hematocrit 39.8      MCV 86      MCH 26.5      MCHC 30.7 (*)     RDW 16.2 (*)     Platelet Count 301      % Neutrophils 87      % Lymphocytes 7      % Monocytes 6      % Eosinophils 0      % Basophils 0      % Immature Granulocytes 0      NRBCs per 100 WBC 0      Absolute Neutrophils 8.4 (*)     Absolute Lymphocytes 0.6 (*)     Absolute Monocytes 0.6      Absolute Eosinophils 0.0      Absolute Basophils 0.0      Absolute Immature Granulocytes 0.0      Absolute NRBCs 0.0     INFLUENZA A/B, RSV, & SARS-COV2 PCR -  Normal    Influenza A PCR Negative      Influenza B PCR Negative      RSV PCR Negative      SARS CoV2 PCR Negative     TROPONIN T, HIGH SENSITIVITY - Normal    Troponin T, High Sensitivity 14     NT PROBNP INPATIENT - Normal    N terminal Pro BNP Inpatient 546           Procedures  None performed    Interventions & Assessments:           Interventions:  Medications   ipratropium - albuterol 0.5 mg/2.5 mg/3 mL (DUONEB) neb solution 3 mL (3 mLs Nebulization $Given 11/12/23 6218)        Assessments:  1700  I obtained history and performed initial assessment of the patient.     Independent Interpretation (X-rays, CTs, rhythm strip):  I independently interpreted the patient's chest x-ray; reassuring against infiltrate.    Consultations/Discussion of Management or Tests:  None    Social Determinants of Health affecting care:   None.      Disposition:  The patient was discharged to home.     Impression & Plan        Medical Decision Making:   Patient with history of COPD oxygen dependent, presenting with shortness of breath and cough.  Vital signs notable for tachycardia on arrival but otherwise within normal limits with the patient on home O2.  Considered differential including acute coronary syndrome, pneumonia, COPD exacerbation, CHF exacerbation, COVID, influenza, among others.  Work-up here was strongly suggestive of COPD exacerbation.  Clinically, patient fits this diagnosis.  EKG was reassuring against any acute ischemic changes and patient's delta troponin was within normal limits and flat.  BNP was not elevated.  Patient has no evidence of hypervolemia.  No leukocytosis or infiltrate suggestive of pneumonia.  Fortunately, patient has oxygen available at home although she is currently on her home O2 settings; she will be able to titrate this if needed.  We will plan for treatment here with DuoNeb x1, providing relief for the patient from wheezing, and plan discharge with prednisone and doxycycline and follow-up  with primary care and pulmonology as needed. Findings were discussed.   Additional verbal instructions were provided.   I discussed specific warning signs and instructed the patient to return to the emergency department if there are any concerns.  Understanding of instructions was voiced, questions were answered and the patient was discharged.     Diagnosis:    ICD-10-CM    1. COPD with acute exacerbation (H)  J44.1       2. Shortness of breath  R06.02            Discharge Medications:  Discharge Medication List as of 11/12/2023  6:11 PM        START taking these medications    Details   doxycycline hyclate (VIBRAMYCIN) 100 MG capsule Take 1 capsule (100 mg) by mouth 2 times daily for 7 days, Disp-14 capsule, R-0, Local Print      predniSONE (DELTASONE) 20 MG tablet Take two tablets (= 40mg) each day for 5 (five) days, Disp-10 tablet, R-0, Local Print              Richard Osullivan MD  11/12/23 2201

## 2023-12-07 NOTE — PLAN OF CARE
Vital signs stable on 3 L nasal cannula overnight except hypertensive. Alert and oriented. Lung sounds diminished with mild wheezing. Bowel sounds active, flatus present. Patient denies pain. Up assist of one to bathroom. Tolerating diet. CMS/neurso intact except + 2 edema in feet. Tele normal sinus.    20

## (undated) DEVICE — DEFIB PRO-PADZ LVP LQD GEL ADULT 8900-2105-01

## (undated) DEVICE — MANIFOLD KIT ANGIO AUTOMATED 014613

## (undated) DEVICE — SLEEVE TR BAND RADIAL COMPRESSION DEVICE 24CM TRB24-REG

## (undated) DEVICE — KIT HAND CONTROL ANGIOTOUCH ACIST 65CM AT-P65

## (undated) DEVICE — CATH JACKY 5FR 3.5 CURVE 40-5023

## (undated) DEVICE — Device

## (undated) DEVICE — WIRE GLIDE 0.035"X150CM VASC GR3506

## (undated) DEVICE — TOTE ANGIO CORP PC15AT SAN32CC83O

## (undated) DEVICE — INTRO GLIDESHEATH SLENDER 6FR 10X45CM 60-1060

## (undated) RX ORDER — FENTANYL CITRATE 50 UG/ML
INJECTION, SOLUTION INTRAMUSCULAR; INTRAVENOUS
Status: DISPENSED
Start: 2019-12-23

## (undated) RX ORDER — NITROGLYCERIN 5 MG/ML
VIAL (ML) INTRAVENOUS
Status: DISPENSED
Start: 2019-12-23

## (undated) RX ORDER — HEPARIN SODIUM 1000 [USP'U]/ML
INJECTION, SOLUTION INTRAVENOUS; SUBCUTANEOUS
Status: DISPENSED
Start: 2019-12-23